# Patient Record
Sex: FEMALE | Race: WHITE | Employment: OTHER | URBAN - METROPOLITAN AREA
[De-identification: names, ages, dates, MRNs, and addresses within clinical notes are randomized per-mention and may not be internally consistent; named-entity substitution may affect disease eponyms.]

---

## 2019-02-19 ENCOUNTER — HOSPITAL ENCOUNTER (INPATIENT)
Age: 84
LOS: 3 days | Discharge: NURSING, REHAB OR LONG-TERM CARE | DRG: 948 | End: 2019-02-22
Attending: EMERGENCY MEDICINE | Admitting: INTERNAL MEDICINE
Payer: MEDICARE

## 2019-02-19 ENCOUNTER — APPOINTMENT (OUTPATIENT)
Dept: CT IMAGING | Age: 84
DRG: 948 | End: 2019-02-19
Attending: EMERGENCY MEDICINE
Payer: MEDICARE

## 2019-02-19 ENCOUNTER — APPOINTMENT (OUTPATIENT)
Dept: GENERAL RADIOLOGY | Age: 84
DRG: 948 | End: 2019-02-19
Attending: EMERGENCY MEDICINE
Payer: MEDICARE

## 2019-02-19 ENCOUNTER — APPOINTMENT (OUTPATIENT)
Dept: GENERAL RADIOLOGY | Age: 84
DRG: 948 | End: 2019-02-19
Attending: NURSE PRACTITIONER
Payer: MEDICARE

## 2019-02-19 ENCOUNTER — APPOINTMENT (OUTPATIENT)
Dept: MRI IMAGING | Age: 84
DRG: 948 | End: 2019-02-19
Attending: NURSE PRACTITIONER
Payer: MEDICARE

## 2019-02-19 ENCOUNTER — APPOINTMENT (OUTPATIENT)
Dept: CT IMAGING | Age: 84
DRG: 948 | End: 2019-02-19
Attending: NURSE PRACTITIONER
Payer: MEDICARE

## 2019-02-19 DIAGNOSIS — R53.1 WEAKNESS: ICD-10-CM

## 2019-02-19 DIAGNOSIS — R55 SYNCOPE AND COLLAPSE: Primary | ICD-10-CM

## 2019-02-19 LAB
ALBUMIN SERPL-MCNC: 3.3 G/DL (ref 3.2–4.6)
ALBUMIN/GLOB SERPL: 0.8 {RATIO} (ref 1.2–3.5)
ALP SERPL-CCNC: 105 U/L (ref 50–136)
ALT SERPL-CCNC: 18 U/L (ref 12–65)
ANION GAP SERPL CALC-SCNC: 4 MMOL/L (ref 7–16)
AST SERPL-CCNC: 16 U/L (ref 15–37)
ATRIAL RATE: 79 BPM
BACTERIA URNS QL MICRO: 0 /HPF
BASOPHILS # BLD: 0 K/UL (ref 0–0.2)
BASOPHILS NFR BLD: 0 % (ref 0–2)
BILIRUB SERPL-MCNC: 0.3 MG/DL (ref 0.2–1.1)
BUN SERPL-MCNC: 23 MG/DL (ref 8–23)
CALCIUM SERPL-MCNC: 8.9 MG/DL (ref 8.3–10.4)
CALCULATED P AXIS, ECG09: 84 DEGREES
CALCULATED R AXIS, ECG10: 59 DEGREES
CALCULATED T AXIS, ECG11: 75 DEGREES
CASTS URNS QL MICRO: 0 /LPF
CHLORIDE SERPL-SCNC: 105 MMOL/L (ref 98–107)
CO2 SERPL-SCNC: 30 MMOL/L (ref 21–32)
CREAT SERPL-MCNC: 1.02 MG/DL (ref 0.6–1)
DIAGNOSIS, 93000: NORMAL
DIFFERENTIAL METHOD BLD: ABNORMAL
EOSINOPHIL # BLD: 0 K/UL (ref 0–0.8)
EOSINOPHIL NFR BLD: 0 % (ref 0.5–7.8)
EPI CELLS #/AREA URNS HPF: 0 /HPF
ERYTHROCYTE [DISTWIDTH] IN BLOOD BY AUTOMATED COUNT: 13.1 % (ref 11.9–14.6)
GLOBULIN SER CALC-MCNC: 4.2 G/DL (ref 2.3–3.5)
GLUCOSE SERPL-MCNC: 127 MG/DL (ref 65–100)
HCT VFR BLD AUTO: 40.2 % (ref 35.8–46.3)
HGB BLD-MCNC: 12.9 G/DL (ref 11.7–15.4)
IMM GRANULOCYTES # BLD AUTO: 0 K/UL (ref 0–0.5)
IMM GRANULOCYTES NFR BLD AUTO: 0 % (ref 0–5)
LYMPHOCYTES # BLD: 0.7 K/UL (ref 0.5–4.6)
LYMPHOCYTES NFR BLD: 7 % (ref 13–44)
MCH RBC QN AUTO: 30.1 PG (ref 26.1–32.9)
MCHC RBC AUTO-ENTMCNC: 32.1 G/DL (ref 31.4–35)
MCV RBC AUTO: 93.9 FL (ref 79.6–97.8)
MONOCYTES # BLD: 0.9 K/UL (ref 0.1–1.3)
MONOCYTES NFR BLD: 9 % (ref 4–12)
NEUTS SEG # BLD: 8 K/UL (ref 1.7–8.2)
NEUTS SEG NFR BLD: 83 % (ref 43–78)
NRBC # BLD: 0 K/UL (ref 0–0.2)
P-R INTERVAL, ECG05: 176 MS
PLATELET # BLD AUTO: 232 K/UL (ref 150–450)
PMV BLD AUTO: 9.6 FL (ref 9.4–12.3)
POTASSIUM SERPL-SCNC: 4.1 MMOL/L (ref 3.5–5.1)
PROT SERPL-MCNC: 7.5 G/DL (ref 6.3–8.2)
Q-T INTERVAL, ECG07: 384 MS
QRS DURATION, ECG06: 82 MS
QTC CALCULATION (BEZET), ECG08: 440 MS
RBC # BLD AUTO: 4.28 M/UL (ref 4.05–5.2)
RBC #/AREA URNS HPF: NORMAL /HPF
SODIUM SERPL-SCNC: 139 MMOL/L (ref 136–145)
TROPONIN I SERPL-MCNC: 0.02 NG/ML (ref 0.02–0.05)
TROPONIN I SERPL-MCNC: <0.02 NG/ML (ref 0.02–0.05)
VENTRICULAR RATE, ECG03: 79 BPM
WBC # BLD AUTO: 9.7 K/UL (ref 4.3–11.1)
WBC URNS QL MICRO: 0 /HPF

## 2019-02-19 PROCEDURE — 65660000000 HC RM CCU STEPDOWN

## 2019-02-19 PROCEDURE — 74011250637 HC RX REV CODE- 250/637: Performed by: NURSE PRACTITIONER

## 2019-02-19 PROCEDURE — 70450 CT HEAD/BRAIN W/O DYE: CPT

## 2019-02-19 PROCEDURE — 85025 COMPLETE CBC W/AUTO DIFF WBC: CPT

## 2019-02-19 PROCEDURE — 74011000258 HC RX REV CODE- 258: Performed by: INTERNAL MEDICINE

## 2019-02-19 PROCEDURE — 80053 COMPREHEN METABOLIC PANEL: CPT

## 2019-02-19 PROCEDURE — 71045 X-RAY EXAM CHEST 1 VIEW: CPT

## 2019-02-19 PROCEDURE — 73562 X-RAY EXAM OF KNEE 3: CPT

## 2019-02-19 PROCEDURE — 81003 URINALYSIS AUTO W/O SCOPE: CPT | Performed by: EMERGENCY MEDICINE

## 2019-02-19 PROCEDURE — 36415 COLL VENOUS BLD VENIPUNCTURE: CPT

## 2019-02-19 PROCEDURE — 99285 EMERGENCY DEPT VISIT HI MDM: CPT | Performed by: EMERGENCY MEDICINE

## 2019-02-19 PROCEDURE — 74011636320 HC RX REV CODE- 636/320: Performed by: INTERNAL MEDICINE

## 2019-02-19 PROCEDURE — 86580 TB INTRADERMAL TEST: CPT | Performed by: NURSE PRACTITIONER

## 2019-02-19 PROCEDURE — 74011250636 HC RX REV CODE- 250/636: Performed by: NURSE PRACTITIONER

## 2019-02-19 PROCEDURE — 73610 X-RAY EXAM OF ANKLE: CPT

## 2019-02-19 PROCEDURE — 93005 ELECTROCARDIOGRAM TRACING: CPT | Performed by: EMERGENCY MEDICINE

## 2019-02-19 PROCEDURE — 87086 URINE CULTURE/COLONY COUNT: CPT

## 2019-02-19 PROCEDURE — 70551 MRI BRAIN STEM W/O DYE: CPT

## 2019-02-19 PROCEDURE — 74011000302 HC RX REV CODE- 302: Performed by: NURSE PRACTITIONER

## 2019-02-19 PROCEDURE — 81015 MICROSCOPIC EXAM OF URINE: CPT

## 2019-02-19 PROCEDURE — 84484 ASSAY OF TROPONIN QUANT: CPT

## 2019-02-19 PROCEDURE — 77030020263 HC SOL INJ SOD CL0.9% LFCR 1000ML

## 2019-02-19 PROCEDURE — 70496 CT ANGIOGRAPHY HEAD: CPT

## 2019-02-19 RX ORDER — PANTOPRAZOLE SODIUM 40 MG/1
40 TABLET, DELAYED RELEASE ORAL
Status: DISCONTINUED | OUTPATIENT
Start: 2019-02-20 | End: 2019-02-22 | Stop reason: HOSPADM

## 2019-02-19 RX ORDER — SODIUM CHLORIDE 9 MG/ML
1 INJECTION, SOLUTION INTRAVENOUS AS NEEDED
Status: DISPENSED | OUTPATIENT
Start: 2019-02-19 | End: 2019-02-20

## 2019-02-19 RX ORDER — SODIUM CHLORIDE 0.9 % (FLUSH) 0.9 %
5-40 SYRINGE (ML) INJECTION EVERY 8 HOURS
Status: DISCONTINUED | OUTPATIENT
Start: 2019-02-19 | End: 2019-02-22 | Stop reason: HOSPADM

## 2019-02-19 RX ORDER — LABETALOL HCL 20 MG/4 ML
5 SYRINGE (ML) INTRAVENOUS
Status: DISCONTINUED | OUTPATIENT
Start: 2019-02-19 | End: 2019-02-22 | Stop reason: HOSPADM

## 2019-02-19 RX ORDER — HEPARIN SODIUM 5000 [USP'U]/ML
5000 INJECTION, SOLUTION INTRAVENOUS; SUBCUTANEOUS EVERY 8 HOURS
Status: DISCONTINUED | OUTPATIENT
Start: 2019-02-19 | End: 2019-02-22 | Stop reason: HOSPADM

## 2019-02-19 RX ORDER — ACETAMINOPHEN 325 MG/1
650 TABLET ORAL
Status: DISCONTINUED | OUTPATIENT
Start: 2019-02-19 | End: 2019-02-22 | Stop reason: HOSPADM

## 2019-02-19 RX ORDER — SODIUM CHLORIDE 9 MG/ML
3 INJECTION, SOLUTION INTRAVENOUS ONCE
Status: COMPLETED | OUTPATIENT
Start: 2019-02-19 | End: 2019-02-20

## 2019-02-19 RX ORDER — SODIUM CHLORIDE 9 MG/ML
75 INJECTION, SOLUTION INTRAVENOUS CONTINUOUS
Status: DISPENSED | OUTPATIENT
Start: 2019-02-19 | End: 2019-02-20

## 2019-02-19 RX ORDER — SODIUM CHLORIDE 0.9 % (FLUSH) 0.9 %
5-40 SYRINGE (ML) INJECTION AS NEEDED
Status: DISCONTINUED | OUTPATIENT
Start: 2019-02-19 | End: 2019-02-22 | Stop reason: HOSPADM

## 2019-02-19 RX ORDER — GUAIFENESIN 100 MG/5ML
81 LIQUID (ML) ORAL DAILY
Status: DISCONTINUED | OUTPATIENT
Start: 2019-02-20 | End: 2019-02-22 | Stop reason: HOSPADM

## 2019-02-19 RX ORDER — ONDANSETRON 2 MG/ML
4 INJECTION INTRAMUSCULAR; INTRAVENOUS
Status: DISCONTINUED | OUTPATIENT
Start: 2019-02-19 | End: 2019-02-22 | Stop reason: HOSPADM

## 2019-02-19 RX ORDER — ATORVASTATIN CALCIUM 40 MG/1
40 TABLET, FILM COATED ORAL
Status: DISCONTINUED | OUTPATIENT
Start: 2019-02-19 | End: 2019-02-22 | Stop reason: HOSPADM

## 2019-02-19 RX ORDER — LEVOTHYROXINE SODIUM 75 UG/1
75 TABLET ORAL
Status: DISCONTINUED | OUTPATIENT
Start: 2019-02-20 | End: 2019-02-22 | Stop reason: HOSPADM

## 2019-02-19 RX ORDER — AMOXICILLIN 250 MG
2 CAPSULE ORAL DAILY PRN
Status: DISCONTINUED | OUTPATIENT
Start: 2019-02-19 | End: 2019-02-22 | Stop reason: HOSPADM

## 2019-02-19 RX ORDER — SODIUM CHLORIDE 0.9 % (FLUSH) 0.9 %
10 SYRINGE (ML) INJECTION
Status: COMPLETED | OUTPATIENT
Start: 2019-02-19 | End: 2019-02-19

## 2019-02-19 RX ADMIN — SODIUM CHLORIDE 75 ML/HR: 900 INJECTION, SOLUTION INTRAVENOUS at 15:41

## 2019-02-19 RX ADMIN — Medication 10 ML: at 23:19

## 2019-02-19 RX ADMIN — SODIUM CHLORIDE 3 ML/KG/HR: 900 INJECTION, SOLUTION INTRAVENOUS at 19:32

## 2019-02-19 RX ADMIN — SODIUM CHLORIDE 100 ML: 900 INJECTION, SOLUTION INTRAVENOUS at 23:19

## 2019-02-19 RX ADMIN — IOPAMIDOL 100 ML: 755 INJECTION, SOLUTION INTRAVENOUS at 23:19

## 2019-02-19 RX ADMIN — ATORVASTATIN CALCIUM 40 MG: 40 TABLET, FILM COATED ORAL at 21:25

## 2019-02-19 RX ADMIN — HEPARIN SODIUM 5000 UNITS: 5000 INJECTION INTRAVENOUS; SUBCUTANEOUS at 21:23

## 2019-02-19 RX ADMIN — Medication 5 ML: at 21:27

## 2019-02-19 RX ADMIN — TUBERCULIN PURIFIED PROTEIN DERIVATIVE 5 UNITS: 5 INJECTION, SOLUTION INTRADERMAL at 23:55

## 2019-02-19 NOTE — PROGRESS NOTES
Patient states that she lives at [facility]. She uses no walker or cane. No home O2 and no PT or OT services. PCP: Dr. Manjinder CAPONE: is patient's son, Rance Sicard: [phone] and his wife Ezra Tarango [phone]. No needs identified at this time.

## 2019-02-19 NOTE — H&P
Hospitalist H&P Note Admit Date:  2019  9:41 AM  
Name:  Vipul Hartman Age:  80 y.o. 
:  1920 MRN:  [id number] PCP:  iGgi Galvan MD 
Treatment Team: Attending Provider: Holly Lopez MD; Primary Nurse: Jesus Duncan RN 
 
HPI:  
 
Patient is a 81 yo female with PMH of GERD and acquired hypothyroidism who came into the ER with complaints of weakness causing her to fall last night and this morning. Patient with left sided knee pain and edematous left foot with bruising. ROM not limited. Xrays without acute process. Patient states she had a headache last night behind her left ear, but denies CP, SOB, visual disturbances, recent illness. Patient reports this happened about two months ago, but did not seek medical attention. CT head with supratentorial microischemia. Suggested follow up MRI. 10 systems reviewed and negative except as noted in HPI. Past Medical History:  
Diagnosis Date  Arthritis  Dependent edema  GERD (gastroesophageal reflux disease)  Headache   
 ocular migraines  Inguinal hernia recurrent bilateral   
 Stasis dermatitis of both legs  Thyroid disease   
 hypothyroid Past Surgical History:  
Procedure Laterality Date  HX COLONOSCOPY    
 HX ENDOSCOPY    
 HH, gastritis,GERD  
 HX GI Antibiotics cause diarrhea  HX GYN Csection  HX HEENT Bilateral Cataract  HX ORTHOPAEDIC    
 wrist surg for fracture Allergies Allergen Reactions  Amoxicillin Diarrhea  Azithromycin Diarrhea  Ceftriaxone Diarrhea  Cefuroxime Diarrhea  Celecoxib Diarrhea  Dexilant [Dexlansoprazole] Diarrhea  Keflex [Cephalexin] Diarrhea  Metronidazole Diarrhea  Sulfamethoxazole-Trimethoprim Diarrhea Social History Tobacco Use  Smoking status: Never Smoker  Smokeless tobacco: Never Used Substance Use Topics  Alcohol use: No  
  
Family History Problem Relation Age of Onset  Stroke Mother Immunization History Administered Date(s) Administered  Influenza Vaccine 10/01/2015, 09/30/2017, 09/09/2018  Pneumococcal Vaccine (Unspecified Type) 01/01/2001 PTA Medications: 
Prior to Admission Medications Prescriptions Last Dose Informant Patient Reported? Taking?  
levothyroxine (SYNTHROID) 75 mcg tablet   No No  
Sig: Take 1 tablet by mouth 5 days a week. Indications: hypothyroidism Facility-Administered Medications: None Objective:  
 
Patient Vitals for the past 24 hrs: 
 Temp Pulse Resp BP SpO2  
02/19/19 0838 98 °F (36.7 °C) 80 18 176/88 100 % Oxygen Therapy O2 Sat (%): 100 % (02/19/19 0838) Pulse via Oximetry: 80 beats per minute (02/19/19 4375) O2 Device: Room air (02/19/19 4892) No intake or output data in the 24 hours ending 02/19/19 1425 Physical Exam: 
General:    Elderly, alert. frail Eyes:   Normal sclera. Extraocular movements intact. ENT:  Normocephalic, atraumatic. Moist mucous membranes CV:   RRR. No m/r/g. Peripheral pulses 2+. Capillary refill <2s. Lungs:  Diminished with Rhonci. On room air. Abdomen: Soft, nontender, nondistended. Bowel sounds normal.  
Extremities: Warm and dry. No cyanosis. Left foot bruised and edematous. Neurologic: CN II-XII grossly intact. Sensation intact. Skin:     No rashes or jaundice. Normal coloration. Bruising on left foot. Psych:  Normal mood and affect. I reviewed the labs, imaging, EKGs, telemetry, and other studies done this admission. Data Review:  
Recent Results (from the past 24 hour(s)) CBC WITH AUTOMATED DIFF Collection Time: 02/19/19  8:44 AM  
Result Value Ref Range WBC 9.7 4.3 - 11.1 K/uL  
 RBC 4.28 4.05 - 5.2 M/uL  
 HGB 12.9 11.7 - 15.4 g/dL HCT 40.2 35.8 - 46.3 % MCV 93.9 79.6 - 97.8 FL  
 MCH 30.1 26.1 - 32.9 PG  
 MCHC 32.1 31.4 - 35.0 g/dL  
 RDW 13.1 11.9 - 14.6 % PLATELET 813 234 - 203 K/uL MPV 9.6 9.4 - 12.3 FL ABSOLUTE NRBC 0.00 0.0 - 0.2 K/uL  
 DF AUTOMATED NEUTROPHILS 83 (H) 43 - 78 % LYMPHOCYTES 7 (L) 13 - 44 % MONOCYTES 9 4.0 - 12.0 % EOSINOPHILS 0 (L) 0.5 - 7.8 % BASOPHILS 0 0.0 - 2.0 % IMMATURE GRANULOCYTES 0 0.0 - 5.0 %  
 ABS. NEUTROPHILS 8.0 1.7 - 8.2 K/UL  
 ABS. LYMPHOCYTES 0.7 0.5 - 4.6 K/UL  
 ABS. MONOCYTES 0.9 0.1 - 1.3 K/UL  
 ABS. EOSINOPHILS 0.0 0.0 - 0.8 K/UL  
 ABS. BASOPHILS 0.0 0.0 - 0.2 K/UL  
 ABS. IMM. GRANS. 0.0 0.0 - 0.5 K/UL METABOLIC PANEL, COMPREHENSIVE Collection Time: 02/19/19  8:44 AM  
Result Value Ref Range Sodium 139 136 - 145 mmol/L Potassium 4.1 3.5 - 5.1 mmol/L Chloride 105 98 - 107 mmol/L  
 CO2 30 21 - 32 mmol/L Anion gap 4 (L) 7 - 16 mmol/L Glucose 127 (H) 65 - 100 mg/dL BUN 23 8 - 23 MG/DL Creatinine 1.02 (H) 0.6 - 1.0 MG/DL  
 GFR est AA >60 >60 ml/min/1.73m2 GFR est non-AA 53 (L) >60 ml/min/1.73m2 Calcium 8.9 8.3 - 10.4 MG/DL Bilirubin, total 0.3 0.2 - 1.1 MG/DL  
 ALT (SGPT) 18 12 - 65 U/L  
 AST (SGOT) 16 15 - 37 U/L Alk. phosphatase 105 50 - 136 U/L Protein, total 7.5 6.3 - 8.2 g/dL Albumin 3.3 3.2 - 4.6 g/dL Globulin 4.2 (H) 2.3 - 3.5 g/dL A-G Ratio 0.8 (L) 1.2 - 3.5    
TROPONIN I Collection Time: 02/19/19  8:44 AM  
Result Value Ref Range Troponin-I, Qt. <0.02 (L) 0.02 - 0.05 NG/ML  
URINE MICROSCOPIC Collection Time: 02/19/19  9:49 AM  
Result Value Ref Range WBC 0 0 /hpf  
 RBC 0-3 0 /hpf Epithelial cells 0 0 /hpf Bacteria 0 0 /hpf Casts 0 0 /lpf EKG, 12 LEAD, INITIAL Collection Time: 02/19/19 10:54 AM  
Result Value Ref Range Ventricular Rate 79 BPM  
 Atrial Rate 79 BPM  
 P-R Interval 176 ms QRS Duration 82 ms Q-T Interval 384 ms QTC Calculation (Bezet) 440 ms Calculated P Axis 84 degrees Calculated R Axis 59 degrees Calculated T Axis 75 degrees Diagnosis Normal sinus rhythm Normal ECG No previous ECGs available Confirmed by ST GOAPL LACY MD (), TRACY MARI ([id number]) on 2/19/2019 12:34:39 PM 
  
 
 
All Micro Results None Other Studies: 
Xr Ankle Lt Min 3 V Result Date: 2/19/2019 Portable left ankle 2/19/2019 INDICATION: Trauma 3 views remarkable for diffuse soft tissue edema. Ankle mortise is intact and there is no fracture or dislocation. IMPRESSION: Soft tissue edema Ct Head Wo Cont Result Date: 2/19/2019 CT scan of the brain 2/19/2019 Scanning was performed within 24 hours of arrival to the facility Comparison:None Dose reduction techniques used: Automated exposure control, adjustment of the mAs and/or kVp according to patient size, standardized low-dose protocol, and/or iterative reconstruction technique. Indication:Headache Findings: The brain parenchyma and ventricular structures are remarkable for subcortical and periventricular white matter lucency. . There is normal white-grey matter differentiation. There are no mass lesions, hemorrhage, or evidence of an acute stroke. The calvarium and the sinuses are unremarkable. Impression: Supratentorial microischemia. Note: If a subtle CVA is suspected, MRI would be more definitive if clinically warranted. Xr Knee Lt 3 V Result Date: 2/19/2019 LEFT KNEE RADIOGRAPHS, 2/19/2019 CLINICAL HISTORY: Fall last night with diffuse knee pain. TECHNIQUE:  AP, lateral, and oblique views of the left knee. FINDINGS: Three views of the left knee are submitted for evaluation. Alignment of the osseous structures is maintained. No acute fractures are seen. A chronic appearing calcification is seen adjacent to the medial femoral condyle. No significant joint effusion is seen. Overlying soft tissues are otherwise unremarkable in appearance. IMPRESSION: 1. No acute osseous abnormality of the left knee evident by plain film imaging. Assessment and Plan:  
 
Hospital Problems as of 2/19/2019 Date Reviewed: 4/16/2018 Codes Class Noted - Resolved POA Weakness ICD-10-CM: R53.1 ICD-9-CM: 780.79  2/19/2019 - Present Unknown PLAN: 
 
Weakness causing fall -IVF 
-PT/OT/PPD/CM/ST 
-CT with possible infarct 
-MRI 
-CTA 
-Echo 
-Permissive BP  
-Lipid panel/cbc/bmp/hgbA1c 
-CXR  
-UA & CS 
-Add statin and asa Hypothyroidism 
-Continue home medications GERD 
-protonix Discharge planning:  Home vs str DVT ppx: SQH Code status:  Full Estimated LOS:  Greater than 2 midnights Risk:  high Plan of care discussed with Dr. Rodolfo Estrada Signed: 
KATIE VelizC

## 2019-02-19 NOTE — PROGRESS NOTES
RN Artist Refugio aware of IV Hydration due to GFR of 53. She will start hydration when pt returns from MRI.

## 2019-02-19 NOTE — ED PROVIDER NOTES
Presents with complaint of weakness. Patient states she had a left-sided headache and felt lightheaded. She denies any dizziness. She then loss of motor strength and filmy ground. She hurt her left knee at that time. She dragged herself up onto the bed and when she got up this morning she was unable to ambulate. She states she had this once before several months ago but her strength came back quickly. She denies any headache now. Patient lives independently and does not use a cane or walker but now is unable to transfer from the wheelchair to toilet. The history is provided by the patient and a relative. The history is limited by the condition of the patient. Knee Pain This is a new problem. The current episode started 12 to 24 hours ago. The problem occurs constantly. The problem has not changed since onset. The pain is present in the left knee and left ankle. The quality of the pain is described as aching. The pain is mild. Pertinent negatives include no numbness, full range of motion and no stiffness. Past Medical History:  
Diagnosis Date  Arthritis  Dependent edema  GERD (gastroesophageal reflux disease)  Headache   
 ocular migraines  Inguinal hernia recurrent bilateral   
 Stasis dermatitis of both legs  Thyroid disease   
 hypothyroid Past Surgical History:  
Procedure Laterality Date  HX COLONOSCOPY    
 HX ENDOSCOPY    
 HH, gastritis,GERD  
 HX GI Antibiotics cause diarrhea  HX GYN Csection  HX HEENT Bilateral Cataract  HX ORTHOPAEDIC    
 wrist surg for fracture Family History:  
Problem Relation Age of Onset  Stroke Mother Social History Socioeconomic History  Marital status:  Spouse name: Not on file  Number of children: Not on file  Years of education: Not on file  Highest education level: Not on file Social Needs  Financial resource strain: Not on file  Food insecurity - worry: Not on file  Food insecurity - inability: Not on file  Transportation needs - medical: Not on file  Transportation needs - non-medical: Not on file Occupational History  Not on file Tobacco Use  Smoking status: Never Smoker  Smokeless tobacco: Never Used Substance and Sexual Activity  Alcohol use: No  
 Drug use: No  
 Sexual activity: Not on file Other Topics Concern  Not on file Social History Narrative  Not on file ALLERGIES: Amoxicillin; Azithromycin; Ceftriaxone; Cefuroxime; Celecoxib; Yumiko Hopes; Keflex [cephalexin]; Metronidazole; and Sulfamethoxazole-trimethoprim Review of Systems Musculoskeletal: Negative for stiffness. Neurological: Negative for numbness. All other systems reviewed and are negative. Vitals:  
 02/19/19 8665 BP: 176/88 Pulse: 80 Resp: 18 Temp: 98 °F (36.7 °C) SpO2: 100% Weight: 46.7 kg (103 lb) Height: 4' 9\" (1.448 m) Physical Exam  
Constitutional: She is oriented to person, place, and time. She appears well-developed and well-nourished. No distress. HENT:  
Head: Normocephalic and atraumatic. Eyes: Conjunctivae are normal. Right eye exhibits no discharge. Left eye exhibits no discharge. Neck: Normal range of motion. Neck supple. Cardiovascular: Normal rate and regular rhythm. Pulmonary/Chest: Effort normal and breath sounds normal. No respiratory distress. Abdominal: Soft. She exhibits no distension. There is no tenderness. Musculoskeletal: She exhibits edema. L knee ecchymosis and L ankle edema, BLE weakness Neurological: She is alert and oriented to person, place, and time. No cranial nerve deficit. She exhibits normal muscle tone. Skin: Skin is warm and dry. Capillary refill takes less than 2 seconds. She is not diaphoretic. Psychiatric: She has a normal mood and affect. Her behavior is normal.  
Nursing note and vitals reviewed. MDM 
Number of Diagnoses or Management Options Syncope and collapse:  
Weakness:  
Diagnosis management comments: Patient yesterday able to do all of her ADLs and walks independently without a cane or walker and now has inability to even transfer herself from wheelchair. It is unclear the cause. He episode she describes some significant syncopal episode. Discussed with the hospitalist for admission Amount and/or Complexity of Data Reviewed Clinical lab tests: ordered and reviewed Review and summarize past medical records: yes (No previous admissions sees Dr. Naren Carroll) Discuss the patient with other providers: yes Independent visualization of images, tracings, or specimens: yes (SR no ST elevation normal QRS) Risk of Complications, Morbidity, and/or Mortality Presenting problems: high Diagnostic procedures: moderate Management options: high Patient Progress Patient progress: stable Procedures

## 2019-02-19 NOTE — PROGRESS NOTES
Patient gone to MRI at this time. Fluids will be started per CT protocol upon return. Patient stable with no complaints before leaving. Patient ate dinner well with no difficulty. Patient able to ambulate x1 assist but with knee weakness noted. Patient was stable though. Patient has been instructed to call if assistance is needed. Patient verbalized understanding. Report to be given to oncoming RN 7p-7a

## 2019-02-19 NOTE — PROGRESS NOTES
Patient arrived to room 805 via transport from ED. Patient is alert and orientated with no complaints at this time. Respirations even and unlabored with no distress noted. IV intact and patent with no s/s of infection noted. Duel skin assessment completed with Glenys Chamberlain RN. Patient has no open skin areas at all. Patients left foot is swollen and bruised on top and bottom. Patient orientated to room and surroundings. Patient on bed rest at this time. Patient will receive a STAND test then diet will be ordered. MRI consent to be completed in computer. Bed in low locked position with call light within reach. Patient instructed to call if assistance is needed. Will continue to monitor.

## 2019-02-19 NOTE — ED TRIAGE NOTES
Pt brought via EMS c/o weakness with 2 falls once last night and once this morning. PT report left sided knee pain. No deformity or pain when not moving. Left foot is swollen no limited rom. 163/84 then 129/76 P-71 O2 sat 100 t-97.8. Patient describes the incident as \"sudden weakness. \" Reports that she had this same thing happen a few months ago.

## 2019-02-19 NOTE — ROUTINE PROCESS
TRANSFER - OUT REPORT: 
 
Verbal report given to Gisselle Meade  on Augusto Maryan  being transferred to  for routine progression of care Report consisted of patients Situation, Background, Assessment and  
Recommendations(SBAR). Information from the following report(s) SBAR was reviewed with the receiving nurse. Lines:  
Peripheral IV 02/19/19 Left Antecubital (Active) Opportunity for questions and clarification was provided. Patient transported with: 
 [employer]

## 2019-02-20 ENCOUNTER — APPOINTMENT (OUTPATIENT)
Dept: ULTRASOUND IMAGING | Age: 84
DRG: 948 | End: 2019-02-20
Attending: INTERNAL MEDICINE
Payer: MEDICARE

## 2019-02-20 LAB
ANION GAP SERPL CALC-SCNC: 7 MMOL/L (ref 7–16)
BUN SERPL-MCNC: 14 MG/DL (ref 8–23)
CALCIUM SERPL-MCNC: 8.2 MG/DL (ref 8.3–10.4)
CHLORIDE SERPL-SCNC: 109 MMOL/L (ref 98–107)
CHOLEST SERPL-MCNC: 157 MG/DL
CO2 SERPL-SCNC: 26 MMOL/L (ref 21–32)
CREAT SERPL-MCNC: 0.89 MG/DL (ref 0.6–1)
ERYTHROCYTE [DISTWIDTH] IN BLOOD BY AUTOMATED COUNT: 13 % (ref 11.9–14.6)
EST. AVERAGE GLUCOSE BLD GHB EST-MCNC: 111 MG/DL
GLUCOSE SERPL-MCNC: 109 MG/DL (ref 65–100)
HBA1C MFR BLD: 5.5 % (ref 4.8–6)
HCT VFR BLD AUTO: 37.2 % (ref 35.8–46.3)
HDLC SERPL-MCNC: 58 MG/DL (ref 40–60)
HDLC SERPL: 2.7 {RATIO}
HGB BLD-MCNC: 12.1 G/DL (ref 11.7–15.4)
LDLC SERPL CALC-MCNC: 81.6 MG/DL
LIPID PROFILE,FLP: NORMAL
MCH RBC QN AUTO: 30.3 PG (ref 26.1–32.9)
MCHC RBC AUTO-ENTMCNC: 32.5 G/DL (ref 31.4–35)
MCV RBC AUTO: 93.2 FL (ref 79.6–97.8)
NRBC # BLD: 0 K/UL (ref 0–0.2)
PLATELET # BLD AUTO: 238 K/UL (ref 150–450)
PMV BLD AUTO: 10.1 FL (ref 9.4–12.3)
POTASSIUM SERPL-SCNC: 3.6 MMOL/L (ref 3.5–5.1)
RBC # BLD AUTO: 3.99 M/UL (ref 4.05–5.2)
SODIUM SERPL-SCNC: 142 MMOL/L (ref 136–145)
TRIGL SERPL-MCNC: 87 MG/DL (ref 35–150)
VLDLC SERPL CALC-MCNC: 17.4 MG/DL (ref 6–23)
WBC # BLD AUTO: 6.5 K/UL (ref 4.3–11.1)

## 2019-02-20 PROCEDURE — 36415 COLL VENOUS BLD VENIPUNCTURE: CPT

## 2019-02-20 PROCEDURE — 80061 LIPID PANEL: CPT

## 2019-02-20 PROCEDURE — 93880 EXTRACRANIAL BILAT STUDY: CPT

## 2019-02-20 PROCEDURE — 92610 EVALUATE SWALLOWING FUNCTION: CPT

## 2019-02-20 PROCEDURE — 74011250637 HC RX REV CODE- 250/637: Performed by: NURSE PRACTITIONER

## 2019-02-20 PROCEDURE — 93306 TTE W/DOPPLER COMPLETE: CPT

## 2019-02-20 PROCEDURE — 83036 HEMOGLOBIN GLYCOSYLATED A1C: CPT

## 2019-02-20 PROCEDURE — 80048 BASIC METABOLIC PNL TOTAL CA: CPT

## 2019-02-20 PROCEDURE — 97161 PT EVAL LOW COMPLEX 20 MIN: CPT

## 2019-02-20 PROCEDURE — 65660000000 HC RM CCU STEPDOWN

## 2019-02-20 PROCEDURE — 85027 COMPLETE CBC AUTOMATED: CPT

## 2019-02-20 PROCEDURE — 74011250636 HC RX REV CODE- 250/636: Performed by: NURSE PRACTITIONER

## 2019-02-20 RX ADMIN — Medication 10 ML: at 21:13

## 2019-02-20 RX ADMIN — ATORVASTATIN CALCIUM 40 MG: 40 TABLET, FILM COATED ORAL at 21:11

## 2019-02-20 RX ADMIN — ASPIRIN 81 MG 81 MG: 81 TABLET ORAL at 09:10

## 2019-02-20 RX ADMIN — PANTOPRAZOLE SODIUM 40 MG: 40 TABLET, DELAYED RELEASE ORAL at 05:40

## 2019-02-20 RX ADMIN — Medication 5 ML: at 16:43

## 2019-02-20 RX ADMIN — HEPARIN SODIUM 5000 UNITS: 5000 INJECTION INTRAVENOUS; SUBCUTANEOUS at 05:36

## 2019-02-20 RX ADMIN — LEVOTHYROXINE SODIUM 75 MCG: 75 TABLET ORAL at 05:34

## 2019-02-20 RX ADMIN — Medication 5 ML: at 05:44

## 2019-02-20 RX ADMIN — HEPARIN SODIUM 5000 UNITS: 5000 INJECTION INTRAVENOUS; SUBCUTANEOUS at 14:00

## 2019-02-20 RX ADMIN — HEPARIN SODIUM 5000 UNITS: 5000 INJECTION INTRAVENOUS; SUBCUTANEOUS at 21:12

## 2019-02-20 NOTE — PROGRESS NOTES
CM spoke with patient and son about patient discharging to rehab. [facility] can accept patient after 3 nights inpatient which will be Friday. CM following.

## 2019-02-20 NOTE — PROGRESS NOTES
Speech language pathology: bedside swallow note: Initial Assessment NAME/AGE/GENDER: Augusto Steinberg is a 80 y.o. female DATE: 2/20/2019 PRIMARY DIAGNOSIS: Weakness [R53.1] ICD-10: Treatment Diagnosis: R13.12 Oropharyngeal Dysphagia. INTERDISCIPLINARY COLLABORATION: Registered Nurse PRECAUTIONS/ALLERGIES: Amoxicillin; Azithromycin; Ceftriaxone; Cefuroxime; Celecoxib; Tracy Galaviz; Keflex [cephalexin]; Metronidazole; and Sulfamethoxazole-trimethoprim ASSESSMENT:Based on the objective data described below, Ms. Sera Diaz presents with swallow function that is within normal limits. Patient presented with thin liquid via cup and straw, puree, mixed, and solid consistencies. Appropriate oral prep with all textures. Timely swallow initiation, and single swallows upon palpation. Adequate oral clearing. No overt signs or symptoms of airway compromise observed with liquid or solid textures. Recommend continue regular diet/thin liquids. Medications whole with liquid wash. No further speech therapy indicated at this time. .?????? ? ? This section established at most recent assessment?????????? 
PROBLEM LIST (Impairments causing functional limitations): 1. Oropharyngeal dysphagia- No symptoms identified REHABILITATION POTENTIAL FOR STATED GOALS: Excellent PLAN OF CARE:  
Patient will benefit from skilled intervention to address the following impairments. RECOMMENDATIONS AND PLANNED INTERVENTIONS (Benefits and precautions of therapy have been discussed with the patient.): 
· continue prescribed diet MEDICATIONS: 
· With liquid ASPIRATION PRECAUTIONS: 
· Slow rate of intake · Small bites/sips · Upright at 90 degrees during meal 
COMPENSATORY STRATEGIES/MODIFICATIONS INCLUDING: 
· None OTHER RECOMMENDATIONS (including follow up treatment recommendations):  
· Patient education RECOMMENDED DIET MODIFICATIONS DISCUSSED WITH: 
· Nursing · Patient FREQUENCY/DURATION: No further speech therapy indicated at this time as oropharyngeal swallow function is within normal limits. RECOMMENDED REHABILITATION/EQUIPMENT: (at time of discharge pending progress): Due to the probability of continued deficits (see above) this patient will not likely need continued skilled speech therapy after discharge. SUBJECTIVE:  
Friendly, pleasant. History of Present Injury/Illness: Ms. Sera Diaz  has a past medical history of Arthritis, Dependent edema, GERD (gastroesophageal reflux disease), Headache, Inguinal hernia recurrent bilateral, Stasis dermatitis of both legs, and Thyroid disease. She also has no past medical history of CAD (coronary artery disease), Cancer ([facility] [address] or Stroke ([facility] [address] .  She also  has a past surgical history that includes hx gyn; hx heent; hx orthopaedic; hx endoscopy; hx colonoscopy; and hx gi. Present Symptoms: Oropharyngeal dysphagia- No symptoms identified Pain Scale 1: Numeric (0 - 10) Pain Intensity 1: 0 Current Medications: No current facility-administered medications on file prior to encounter. Current Outpatient Medications on File Prior to Encounter Medication Sig Dispense Refill  levothyroxine (SYNTHROID) 75 mcg tablet Take 1 tablet by mouth 5 days a week. Indications: hypothyroidism (Patient taking differently: [id number] tablet by mouth 5 days a week. On Tuesday and Thursday only takes 50mcg tablet.) 60 Tab 3 Current Dietary Status:   
 Regular/thin History of reflux:  YES  
? Reflux medication: PRN antacids Social History/Home Situation:   
Home Environment: Independent living([place]) One/Two Story Residence: One story Living Alone: Yes Support Systems: Child(luma) Patient Expects to be Discharged to[de-identified] Unknown Current DME Used/Available at Home: None OBJECTIVE:  
Respiratory Status:       
 
Oral Motor Structure/Speech:  Oral-Motor Structure/Motor Speech Labial: No impairment Dentition: Intact Lingual: No impairment Cognitive and Communication Status: 
Neurologic State: Alert Orientation Level: Oriented X4 Cognition: Follows commands; Appropriate for age attention/concentration Perception: Appears intact Perseveration: No perseveration noted Safety/Judgement: Awareness of environment BEDSIDE SWALLOW EVALUATIONOral Assessment: 
Oral Assessment Labial: No impairment Dentition: Intact Lingual: No impairment P.O. Trials: 
Patient Position: Upright in bed The patient was given tsp-small bite amounts of the following:  
Consistency Presented: Thin liquid; Solid;Puree;Mixed consistency How Presented: Self-fed/presented;Cup/sip;Spoon;Straw;Successive swallows ORAL PHASE: 
Bolus Acceptance: No impairment Bolus Formation/Control: No impairment Propulsion: No impairment Oral Residue: None PHARYNGEAL PHASE: 
Initiation of Swallow: No impairment Laryngeal Elevation: Functional 
Aspiration Signs/Symptoms: None Vocal Quality: No impairment Effective Modifications: None Pharyngeal Phase Characteristics: No impairment, issues, or problems OTHER OBSERVATIONS: 
Rate/bite size: WNL Endurance: WNL Comments: Tool Used: Dysphagia Outcome and Severity Scale (BASILIA) Score Comments Normal Diet  [x] 7 With no strategies or extra time needed Functional Swallow  [] 6 May have mild oral or pharyngeal delay Mild Dysphagia 
  [] 5 Which may require one diet consistency restricted (those who demonstrate penetration which is entirely cleared on MBS would be included) Mild-Moderate Dysphagia  [] 4 With 1-2 diet consistencies restricted Moderate Dysphagia  [] 3 With 2 or more diet consistencies restricted Moderately Severe Dysphagia  [] 2 With partial PO strategies (trials with ST only) Severe Dysphagia  [] 1 With inability to tolerate any PO safely Score:  Initial: 7 Most Recent: X (Date: --) Interpretation of Tool: The Dysphagia Outcome and Severity Scale (BASILIA) is a simple, easy-to-use, 7-point scale developed to systematically rate the functional severity of dysphagia based on objective assessment and make recommendations for diet level, independence level, and type of nutrition. [place] / [place] / Product Type: Medicare /  
 
TREATMENT:  
 (In addition to Assessment/Re-Assessment sessions the following treatments were rendered) Assessment/Reassessment only, no treatment provided today MODALITIES:  
  
  
  
  
  
  
  
  
  
  
    
  
  
  
  
  
  
  
  
   
 
ORAL MOTOR  EXERCISES: 
  
  
  
  
  
  
  
  
  
  
  
  
  
  
  
  
  
  
  
  
  
  
  
  
  
  
    
  
  
  
  
  
  
  
  
  
  
  
  
  
  
  
  
  
  
  
  
  
  
  
  
  
   
 
LARYNGEAL / PHARYNGEAL EXERCISES: 
  
  
  
  
  
  
  
  
  
  
  
  
  
  
  
  
  
  
  
  
  
    
  
  
  
  
  
  
  
  
  
  
  
  
  
  
  
  
  
  
  
   
 
__________________________________________________________________________________________________ Safety: After treatment position/precautions: · Upright in Bed. Recommendations/Intent for next treatment session:No further speech therapy indicated at this time as swallow function is within normal limits. Please re-consult if new concerns arise. Total Treatment Duration: 
Time In: 2522 Time Out: 2694 [address] CCC-SLP

## 2019-02-20 NOTE — ROUTINE PROCESS
Order was put in at 3pm and floor was called to start iv hydration at that time. Patient was also for an MRI also today. CTA head/neck was ordered for CT  IV hydration was not started on 1st shift by the 1st shift RN. Evening RN called at 7:30 to start iv hydration and will plan on bringing patient down at 10:30 for scan.

## 2019-02-20 NOTE — PROGRESS NOTES
Patient voices no concerns at this time. Patient relaxing in recliner watching TV. Patient denies any pain at this time and appears comfortable. Call light within reach and patient instructed to call if assistance is needed. Will continue to monitor.

## 2019-02-20 NOTE — PROGRESS NOTES
Patient stable with no complaints at this time. Patient resting in bed eating dinner. Patient denies any pain and appears comfortable at this time. Patient had duplex on carotid arteries today with less than 50% stenosis bilaterally. Patient is relaxed and has no s/s of discomfort. Safety measures in place. Patient is very pleasant. Call light within reach and patient instructed to call if assistance is needed. Report to be given to oncoming RN 7p-7a

## 2019-02-20 NOTE — PROGRESS NOTES
Patient resting in bed with no complaints at this time. Patient is alert and orientated with no distress noted. IV intact and patent with no s/s of infection noted. Respirations even and unlabored with heart rate regular. Patient unable to ambulate independently without assistance; needs x1 r/t weakness and left foot and ankle pain. Bed in low locked position with call light within reach. Patient instructed to call if assistance is needed. Will continue to monitor.

## 2019-02-20 NOTE — PROGRESS NOTES
Problem: Nutrition Deficit Goal: *Optimize nutritional status Nutrition Reason for assessment: Diet Education Consult: suspected stroke. Candice Woodson NP) Assessment:  
Diet order(s): RegularFood/Nutrition Patient History:  The patient has a h/o GERD. She states that she does not take any medication for her GERD but prefers to control it with diet. She states that her appetite prior to admission was very good. She states that she eats well at her skilled nursing. She denies any weight loss and states that she stays around 100 lbs and does not really get below 100 lbs. She does not use any oral nutrition supplements but does inquire if she should be drinking one. RD discussed ONS and the use for if she starts to have a decline in appetite or if she starts loosing weight. At this point an ONS is not needed. Anthropometrics:Height: 4' 9\" (144.8 cm),  Weight: 46.1 kg (101 lb 9.6 oz), Weight Source: Standing scale (comment), Body mass index is 20.52 kg/m². BMI class of underweight for age >71. Macronutrient needs: EER:  0995-5666 kcal /day (30-35 kcal/kg usual BW) EPR:  46-58 grams protein/day (1-1.25 grams/kg usual BW) Intake/Comparative Standards: No recorded po intakes at this time. Nutrition Diagnosis: No acute nutrition related diagnosis at this time. Intervention: 
Meals and snacks: Continue current diet. Nutrition Supplement Therapy: None at this time Nutrition Discharge Plan: No nutrition needs identified for discharge. [place] [address], [address], [phone]

## 2019-02-20 NOTE — PROGRESS NOTES
Problem: Falls - Risk of 
Goal: *Absence of Falls Document Talat Mendez Fall Risk and appropriate interventions in the flowsheet. Outcome: Progressing Towards Goal 
Fall Risk Interventions: 
Mobility Interventions: Communicate number of staff needed for ambulation/transfer, Bed/chair exit alarm, Assess mobility with egress test, Patient to call before getting OOB, Strengthening exercises (ROM-active/passive), PT Consult for assist device competence, PT Consult for mobility concerns, OT consult for ADLs Medication Interventions: Evaluate medications/consider consulting pharmacy Elimination Interventions: Call light in reach, Patient to call for help with toileting needs History of Falls Interventions: Bed/chair exit alarm, Consult care management for discharge planning, Evaluate medications/consider consulting pharmacy, Investigate reason for fall, Door open when patient unattended

## 2019-02-20 NOTE — PROGRESS NOTES
Patient admitted yesterday afternoon after two falls at her shelter with reported Left side weakness and knee pain. She is being evaluated for stroke. Patient lives at [facility] in the [facility] portion. She uses no cane or walker at baseline. If patient requires therapy at discharge, outpatient PT/OT/ST can be arranged through [facility], or she can move to their SNF for short-term rehab prior to return to the [facility] if needed. Case Management will continue to follow. Care Management Interventions PCP Verified by CM: Yes 
Palliative Care Criteria Met (RRAT>21 & CHF Dx)?: No 
Mode of Transport at Discharge: [facility] Transition of Care Consult (CM Consult): Discharge Planning Discharge Durable Medical Equipment: No 
Physical Therapy Consult: Yes Occupational Therapy Consult: Yes Speech Therapy Consult: Yes Current Support Network: Assisted Living([facility] [employer] [facility] [employer]) Confirm Follow Up Transport: Family Plan discussed with Pt/Family/Caregiver: Yes Freedom of Choice Offered: Yes [employer] Information Provided?: No 
Discharge Location Discharge Placement: Assisted Living

## 2019-02-20 NOTE — PROGRESS NOTES
Problem: Mobility Impaired (Adult and Pediatric) Goal: *Acute Goals and Plan of Care (Insert Text) LTG: 
(1.)Ms. Coleen Medel will move from supine to sit and sit to supine , scoot up and down and roll side to side in bed with SUPERVISION within 7 treatment day(s). (2.)Ms. Coleen Medel will transfer from bed to chair and chair to bed with SUPERVISION using the least restrictive device within 7 treatment day(s). (3.)Ms. Coleen Medel will ambulate with STAND BY ASSIST for 250+ feet with the least restrictive device within 7 treatment day(s). ________________________________________________________________________________________________ PHYSICAL THERAPY: Initial Assessment and AM 2/20/2019INPATIENT:   
[place] / Plan[place] PART A AND B / Product Type: Medicare /   
  
NAME/AGE/GENDER: Robin Dela Cruz is a 80 y.o. female PRIMARY DIAGNOSIS: Weakness [R53.1] <principal problem not specified> <principal problem not specified> 
 
  
ICD-10: Treatment Diagnosis:  
 · Generalized Muscle Weakness (M62.81) · Difficulty in walking, Not elsewhere classified (R26.2) Precaution/Allergies: 
Amoxicillin; Azithromycin; Ceftriaxone; Cefuroxime; Celecoxib; [place]; Keflex [cephalexin]; Metronidazole; and Sulfamethoxazole-trimethoprim ASSESSMENT:  
Ms. Coleen Medel presents supine at arrival, pleasant and willing to participate. She is 80 and has all her faculties. She describes fall event as a sudden loss of strength dropping her to floor. She had a few minutes warning of 'not feeling right' followed by weakness drop to floor. She is very aware of event and can describe well. Her description follows a pattern of possible crisis of [place]. When going to stand she still needs her UEs to help even she attempted without. Her ambulation is in trunk flex and difficulty advancing LEs possibly due to her described weakness.   She also states she has sore L knee and swollen L foot. Her impairments include decreased functional mobility, decreased balance, decreased strength, decreased safety awareness, increased risk for falls. Pt would benefit from further PT while in house to address these impairments to help improve to prior level of independence. Discussed possible use of RW at home, pt agreed to try. This section established at most recent assessment PROBLEM LIST (Impairments causing functional limitations): 1. Decreased Strength 2. Decreased ADL/Functional Activities 3. Decreased Transfer Abilities 4. Decreased Ambulation Ability/Technique 5. Decreased Balance 6. Decreased Activity Tolerance 7. Increased Fatigue 8. Decreased Flexibility/Joint Mobility INTERVENTIONS PLANNED: (Benefits and precautions of physical therapy have been discussed with the patient.) 1. Balance Exercise 2. Bed Mobility 3. Gait Training 4. Neuromuscular Re-education/Strengthening 5. Range of Motion (ROM) 6. Therapeutic Activites 7. Therapeutic Exercise/Strengthening 8. Transfer Training TREATMENT PLAN: Frequency/Duration: 3 times a week for duration of hospital stay Rehabilitation Potential For Stated Goals: Excellent RECOMMENDED REHABILITATION/EQUIPMENT: (at time of discharge pending progress): Due to the probability of continued deficits (see above) this patient will likely need continued skilled physical therapy after discharge. Equipment:  
? Walkers, Type: [place] HISTORY:  
History of Present Injury/Illness (Reason for Referral): 
See h/p Past Medical History/Comorbidities: Ms. Lemuel Aparicio  has a past medical history of Arthritis, Dependent edema, GERD (gastroesophageal reflux disease), Headache, Inguinal hernia recurrent bilateral, Stasis dermatitis of both legs, and Thyroid disease.  She also has no past medical history of CAD (coronary artery disease), Cancer [facility]), or Stroke ([facility] [address] Ms. Kamryn Clark  has a past surgical history that includes hx gyn; hx heent; hx orthopaedic; hx endoscopy; hx colonoscopy; and hx gi. Social History/Living Environment:  
Home Environment: Independent living One/Two Story Residence: Two story Living Alone: Yes Support Systems: Assisted living Patient Expects to be Discharged to[de-identified] Assisted living Current DME Used/Available at Home: None Prior Level of Function/Work/Activity: 
Pt lives in [facility]. She does not use any AD and she uses stairs to go to dining room instead of elevator for exercise. Pt is very sharp and aware of environment. Number of Personal Factors/Comorbidities that affect the Plan of Care: 0: LOW COMPLEXITY EXAMINATION:  
Most Recent Physical Functioning:  
Gross Assessment: 
AROM: Generally decreased, functional 
Strength: Within functional limits Coordination: Within functional limits Tone: Normal 
Sensation: Intact Posture: 
Posture (WDL): Exceptions to [facility] Posture Assessment: Trunk flexion Balance: 
Sitting: Intact Standing: Impaired Standing - Static: Fair Standing - Dynamic : Fair Bed Mobility: 
Rolling: Independent Supine to Sit: Independent Scooting: Independent Wheelchair Mobility: 
  
Transfers: 
Sit to Stand: Contact guard assistance Stand to Sit: Contact guard assistance Gait: 
  
Base of Support: Narrowed; Center of gravity altered;Shift to right Gait Abnormalities: Decreased step clearance;Shuffling gait Distance (ft): 18 Feet (ft) Assistive Device: Walker, rolling Ambulation - Level of Assistance: Minimal assistance Body Structures Involved: 1. Nerves 2. Heart 3. Joints 4. Muscles 5. Ligaments Body Functions Affected: 1. Mental 
2. Cardio 3. Respiratory 4. Neuromusculoskeletal 
5. Movement Related Activities and Participation Affected: 1. General Tasks and Demands 2. Mobility 3. Self Care 4. Domestic Life Number of elements that affect the Plan of Care: 1-2: LOW COMPLEXITY CLINICAL PRESENTATION:  
Presentation: Stable and uncomplicated: LOW COMPLEXITY CLINICAL DECISION MAKIN [facility] [address] [address] AM-PAC 6 Clicks Basic Mobility Inpatient Short Form How much difficulty does the patient currently have. .. Unable A Lot A Little None 1. Turning over in bed (including adjusting bedclothes, sheets and blankets)? [] 1   [] 2   [] 3   [x] 4  
2. Sitting down on and standing up from a chair with arms ( e.g., wheelchair, bedside commode, etc.)   [] 1   [] 2   [x] 3   [] 4  
3. Moving from lying on back to sitting on the side of the bed? [] 1   [] 2   [] 3   [x] 4 How much help from another person does the patient currently need. .. Total A Lot A Little None 4. Moving to and from a bed to a chair (including a wheelchair)? [] 1   [] 2   [x] 3   [] 4  
5. Need to walk in hospital room? [] 1   [] 2   [x] 3   [] 4  
6. Climbing 3-5 steps with a railing? [] 1   [x] 2   [] 3   [] 4  
© , Trustees of [address] [facility] [address] [address], under license to [employer]. All rights reserved Score:  Initial: 19 Most Recent: X (Date: -- ) Interpretation of Tool:  Represents activities that are increasingly more difficult (i.e. Bed mobility, Transfers, Gait). Medical Necessity:    
· Skilled intervention continues to be required due to decreased function. Reason for Services/Other Comments: 
· Patient continues to require skilled intervention due to medical complications and patient unable to attend/participate in therapy as expected. Use of outcome tool(s) and clinical judgement create a POC that gives a: Clear prediction of patient's progress: LOW COMPLEXITY  
  
 
 
 
TREATMENT:  
(In addition to Assessment/Re-Assessment sessions the following treatments were rendered) Pre-treatment Symptoms/Complaints: Im weak Pain: Initial:  
Pain Intensity 1: 0  Post Session:  none Assessment/Reassessment only, no treatment provided today Braces/Orthotics/Lines/Etc:  
· O2 Device: Room air Treatment/Session Assessment:   
· Response to Treatment:  See above · Interdisciplinary Collaboration:  
o Physical Therapist 
o Registered Nurse · After treatment position/precautions:  
o Up in chair 
o Call light within reach 
o RN notified · Compliance with Program/Exercises: Will assess as treatment progresses · Recommendations/Intent for next treatment session: \"Next visit will focus on advancements to more challenging activities and reduction in assistance provided\". Total Treatment Duration: PT Patient Time In/Time Out Time In: 5919 Time Out: 1025 Amalia Aaron DPT

## 2019-02-20 NOTE — PROGRESS NOTES
Hospitalist Progress Note 2019 Admit Date: 2019  9:41 AM  
NAME: Augusto Steinberg :  1920 MRN:  [id number] Attending: Adrienne Jimenes MD 
PCP:  Elke Acuna MD 
 
SUBJECTIVE:  
Patient is a 79 yo female with PMH of GERD and acquired hypothyroidism who came into the ER with complaints of weakness causing her to fall last night and this morning. Patient with left sided knee pain and edematous left foot with bruising. ROM not limited. Xrays without acute process. Patient states she had a headache last night behind her left ear, but denies CP, SOB, visual disturbances, recent illness. Patient reports this happened about two months ago, but did not seek medical attention. CT head with supratentorial microischemia. Suggested follow up MRI. Interval History (): patient examined at bedside. No acute events since admission. Still feels a little weak \"but I would say that I feel a little better than yesterday. \" No chest pain, shortness of breath, headache, blurry vision, heart palpitations, skipped beats, abdominal pain, nausea/vomiting, diarrhea, constipation, dysuria, dizziness/vertigo. Review of Systems negative with exception of pertinent positives noted above PHYSICAL EXAM  
 
Visit Vitals /80 Pulse 78 Temp 98.3 °F (36.8 °C) Resp 18 Ht 4' 9\" (1.448 m) Wt 46.1 kg (101 lb 9.6 oz) SpO2 95% BMI 20.52 kg/m² Temp (24hrs), Av.3 °F (36.8 °C), Min:97.5 °F (36.4 °C), Max:99 °F (37.2 °C) Oxygen Therapy O2 Sat (%): 95 % (19 9079) Pulse via Oximetry: 80 beats per minute (19 3311) O2 Device: Room air (19 9954) Intake/Output Summary (Last 24 hours) at 2019 1500 Last data filed at 2019 1314 Gross per 24 hour Intake 480 ml Output 400 ml Net 80 ml General: No acute distress, very pleasant Lungs:  CTA Bilaterally without R/R/W Heart:  Regular rate and rhythm,  No murmur, rub, or gallop Abdomen: Soft, Non distended, Non tender, Positive bowel sounds Extremities: No cyanosis, clubbing or edema Neurologic:  No focal deficits. +5/5 muscle strength UEs and LEs, sensation to light touch intact b/l, finger-to-nose testing unremarkable, CN II thru XII intact b/l ASSESSMENT Active Hospital Problems Diagnosis Date Noted  Weakness 02/19/2019 Plan: # Sudden onset generalized weakness s/p fall, uncertain etiology (?very infrequent arrhythmia?) - MRI brain showing chronic changes without accompanying acute changes - CTA head negative 
- no arrhythmias on telemetry - carotid ultrasound negative - awaiting TTE results 
- PT/OT consults 
- lipid panel and HgbA1c unremarkable - UA negative # Hypothyroidism 
- continue home levothyroxine # GERD 
- continue PPI 
 
F/E/N: no fluids, replete electrolytes as needed, cardiac diet Ppx: heparin SQ for VTE Code Status: DNR Disposition: pending workup as above. Patient wishes to pursue SNF at discharge, can likely go back to [facility] on Friday. PPD ordered. All questions answered. Signed By: Maryann Knight DO February 20, 2019

## 2019-02-21 LAB
MM INDURATION POC: 0 MM (ref 0–5)
MM INDURATION POC: NORMAL MM (ref 0–5)
PPD POC: NEGATIVE NEGATIVE
PPD POC: NEGATIVE NEGATIVE

## 2019-02-21 PROCEDURE — 74011250637 HC RX REV CODE- 250/637: Performed by: NURSE PRACTITIONER

## 2019-02-21 PROCEDURE — 97530 THERAPEUTIC ACTIVITIES: CPT

## 2019-02-21 PROCEDURE — 97110 THERAPEUTIC EXERCISES: CPT

## 2019-02-21 PROCEDURE — 65660000000 HC RM CCU STEPDOWN

## 2019-02-21 PROCEDURE — 74011250636 HC RX REV CODE- 250/636: Performed by: NURSE PRACTITIONER

## 2019-02-21 PROCEDURE — 97165 OT EVAL LOW COMPLEX 30 MIN: CPT

## 2019-02-21 RX ADMIN — Medication 10 ML: at 14:16

## 2019-02-21 RX ADMIN — LEVOTHYROXINE SODIUM 75 MCG: 75 TABLET ORAL at 06:07

## 2019-02-21 RX ADMIN — ASPIRIN 81 MG 81 MG: 81 TABLET ORAL at 09:02

## 2019-02-21 RX ADMIN — HEPARIN SODIUM 5000 UNITS: 5000 INJECTION INTRAVENOUS; SUBCUTANEOUS at 14:15

## 2019-02-21 RX ADMIN — PANTOPRAZOLE SODIUM 40 MG: 40 TABLET, DELAYED RELEASE ORAL at 06:07

## 2019-02-21 RX ADMIN — ATORVASTATIN CALCIUM 40 MG: 40 TABLET, FILM COATED ORAL at 21:32

## 2019-02-21 RX ADMIN — Medication 5 ML: at 06:12

## 2019-02-21 RX ADMIN — HEPARIN SODIUM 5000 UNITS: 5000 INJECTION INTRAVENOUS; SUBCUTANEOUS at 06:07

## 2019-02-21 RX ADMIN — Medication 10 ML: at 21:33

## 2019-02-21 RX ADMIN — HEPARIN SODIUM 5000 UNITS: 5000 INJECTION INTRAVENOUS; SUBCUTANEOUS at 21:32

## 2019-02-21 NOTE — PROGRESS NOTES
Pt is sitting up in bed, watching tv. Pt voices no complaints or concerns at this time. Pt appears in no acute distress. SBAR end of shift report given to oncoming RN.

## 2019-02-21 NOTE — PROGRESS NOTES
SBAR shift report received from [place], RN. Pt stable. Assessment complete. Pt is lying in bed. Resp even, unlabored. Pt is alert, orient X 4. Pt appears in no acute distress at this time. Pt is on RA. Pt L ankle noted to be swollen and bruised. Pt voices no complaints or concerns at this time. Pt encouraged to call for assistance, call light in reach. Safety measures in place. Will continue to monitor

## 2019-02-21 NOTE — PROGRESS NOTES
Patient accepted for short-term rehab at [facility]. She will need three midnights of inpatient stay before she is eligible. Case Management will continue to follow. Care Management Interventions PCP Verified by CM: Yes 
Palliative Care Criteria Met (RRAT>21 & CHF Dx)?: No 
Mode of Transport at Discharge: [facility] Transition of Care Consult (CM Consult): Discharge Planning, SNF Discharge Durable Medical Equipment: No 
Physical Therapy Consult: Yes Occupational Therapy Consult: Yes Speech Therapy Consult: Yes Current Support Network: Assisted Living([facility] [email or web address] [facility] [employer]) Confirm Follow Up Transport: Family Plan discussed with Pt/Family/Caregiver: Yes Freedom of Choice Offered: Yes [employer] Information Provided?: No 
Discharge Location Discharge Placement: Rehab Unit Subacute

## 2019-02-21 NOTE — PROGRESS NOTES
Problem: Mobility Impaired (Adult and Pediatric) Goal: *Acute Goals and Plan of Care (Insert Text) LTG: 
(1.)Ms. Nancy Prajapati will move from supine to sit and sit to supine , scoot up and down and roll side to side in bed with SUPERVISION within 7 treatment day(s). (2.)Ms. Nancy Prajapati will transfer from bed to chair and chair to bed with SUPERVISION using the least restrictive device within 7 treatment day(s). (3.)Ms. Nancy Prajapati will ambulate with STAND BY ASSIST for 250+ feet with the least restrictive device within 7 treatment day(s). ________________________________________________________________________________________________ PHYSICAL THERAPY: Daily Note and AM 2/21/2019INPATIENT: PT Visit Days : 1 [place] / Product Type: Medicare /   
  
NAME/AGE/GENDER: Mariely Saldivar is a 80 y.o. female PRIMARY DIAGNOSIS: Weakness [R53.1] <principal problem not specified> <principal problem not specified> 
 
  
ICD-10: Treatment Diagnosis:  
 · Generalized Muscle Weakness (M62.81) · Difficulty in walking, Not elsewhere classified (R26.2) Precaution/Allergies: 
Amoxicillin; Azithromycin; Ceftriaxone; Cefuroxime; Celecoxib; [place]; Keflex [cephalexin]; Metronidazole; and Sulfamethoxazole-trimethoprim ASSESSMENT:  
Ms. Nancy Prajapati presents sitting in chair and willing to participate. She is 80 and has all her faculties. She describes fall event as a sudden loss of strength dropping her to floor. She had a few minutes warning of 'not feeling right' followed by weakness drop to floor. She is very aware of event and can describe well. Patient performed seated exercises below then walked about 30 feet 3 times. She reports her L leg/ ankle feel much better than yesterday. Good progress and should be going to rehab where she should do well tomorrow. This section established at most recent assessment PROBLEM LIST (Impairments causing functional limitations): 1. Decreased Strength 2. Decreased ADL/Functional Activities 3. Decreased Transfer Abilities 4. Decreased Ambulation Ability/Technique 5. Decreased Balance 6. Decreased Activity Tolerance 7. Increased Fatigue 8. Decreased Flexibility/Joint Mobility INTERVENTIONS PLANNED: (Benefits and precautions of physical therapy have been discussed with the patient.) 1. Balance Exercise 2. Bed Mobility 3. Gait Training 4. Neuromuscular Re-education/Strengthening 5. Range of Motion (ROM) 6. Therapeutic Activites 7. Therapeutic Exercise/Strengthening 8. Transfer Training TREATMENT PLAN: Frequency/Duration: 3 times a week for duration of hospital stay Rehabilitation Potential For Stated Goals: Excellent RECOMMENDED REHABILITATION/EQUIPMENT: (at time of discharge pending progress): Due to the probability of continued deficits (see above) this patient will likely need continued skilled physical therapy after discharge. Equipment:  
? Walkers, Type: Rolling [employer] HISTORY:  
History of Present Injury/Illness (Reason for Referral): 
See h/p Past Medical History/Comorbidities: Ms. Sandi Guerrero  has a past medical history of Arthritis, Dependent edema, GERD (gastroesophageal reflux disease), Headache, Inguinal hernia recurrent bilateral, Stasis dermatitis of both legs, and Thyroid disease. She also has no past medical history of CAD (coronary artery disease), Cancer ([address] or Stroke ([address] Ms. Sandi Guerrero  has a past surgical history that includes hx gyn; hx heent; hx orthopaedic; hx endoscopy; hx colonoscopy; and hx gi. Social History/Living Environment:  
Home Environment: Independent living One/Two Story Residence: Two story Living Alone: Yes Support Systems: Assisted living Patient Expects to be Discharged to[de-identified] Assisted living Current DME Used/Available at Home: None Prior Level of Function/Work/Activity: Pt lives in [facility]. She does not use any AD and she uses stairs to go to dining room instead of elevator for exercise. Pt is very sharp and aware of environment. Number of Personal Factors/Comorbidities that affect the Plan of Care: 0: LOW COMPLEXITY EXAMINATION:  
Most Recent Physical Functioning:  
Gross Assessment: 
  
         
  
Posture: 
  
Balance: 
Standing - Static: Fair Standing - Dynamic : Fair Bed Mobility: 
  
Wheelchair Mobility: 
  
Transfers: 
Sit to Stand: Contact guard assistance Stand to Sit: Contact guard assistance Gait: 
  
Gait Abnormalities: Antalgic;Decreased step clearance; Steppage gait Distance (ft): 30 Feet (ft)(x3) Assistive Device: Walker, rolling Ambulation - Level of Assistance: Contact guard assistance Body Structures Involved: 1. Nerves 2. Heart 3. Joints 4. Muscles 5. Ligaments Body Functions Affected: 1. Mental 
2. Cardio 3. Respiratory 4. Neuromusculoskeletal 
5. Movement Related Activities and Participation Affected: 1. General Tasks and Demands 2. Mobility 3. Self Care 4. Domestic Life Number of elements that affect the Plan of Care: 1-2: LOW COMPLEXITY CLINICAL PRESENTATION:  
Presentation: Stable and uncomplicated: LOW COMPLEXITY CLINICAL DECISION MAKIN[address] [place] [address] 6 Clicks Basic Mobility Inpatient Short Form How much difficulty does the patient currently have. .. Unable A Lot A Little None 1. Turning over in bed (including adjusting bedclothes, sheets and blankets)? [] 1   [] 2   [] 3   [x] 4  
2. Sitting down on and standing up from a chair with arms ( e.g., wheelchair, bedside commode, etc.)   [] 1   [] 2   [x] 3   [] 4  
3. Moving from lying on back to sitting on the side of the bed? [] 1   [] 2   [] 3   [x] 4 How much help from another person does the patient currently need. .. Total A Lot A Little None 4. Moving to and from a bed to a chair (including a wheelchair)?    [] 1 [] 2   [x] 3   [] 4  
5. Need to walk in hospital room? [] 1   [] 2   [x] 3   [] 4  
6. Climbing 3-5 steps with a railing? [] 1   [x] 2   [] 3   [] 4  
© 2007, Trustees of [address] [place] [address] [address] [id number], under license to [employer]. All rights reserved Score:  Initial: 19 Most Recent: X (Date: -- ) Interpretation of Tool:  Represents activities that are increasingly more difficult (i.e. Bed mobility, Transfers, Gait). Medical Necessity:    
· Skilled intervention continues to be required due to decreased function. Reason for Services/Other Comments: 
· Patient continues to require skilled intervention due to medical complications and patient unable to attend/participate in therapy as expected. Use of outcome tool(s) and clinical judgement create a POC that gives a: Clear prediction of patient's progress: LOW COMPLEXITY  
  
 
 
 
TREATMENT:  
(In addition to Assessment/Re-Assessment sessions the following treatments were rendered) Pre-treatment Symptoms/Complaints: \"I will try\" Pain: Initial:  
Pain Intensity 1: 0  Post Session:  none Therapeutic Activity: (    15 minutes): Therapeutic activities including Chair transfers and Ambulation on level ground to improve mobility, strength, balance and endurance. Required minimal   to promote static and dynamic balance in standing. Therapeutic Exercise: (10 Minutes):  Exercises per grid below to improve mobility and strength. Required minimal visual and verbal cues to promote proper body mechanics. Progressed complexity of movement as indicated. Date: 
2/21/19 Date: 
 Date: Activity/Exercise Parameters Parameters Parameters Seated TKE 20x B Seated AP 20x B Seated marching 20x B Seated hip abd 20x B Braces/Orthotics/Lines/Etc:  
· O2 Device: Room air Treatment/Session Assessment:   
· Response to Treatment:  See above · Interdisciplinary Collaboration:  
o Physical Therapy Assistant 
o Registered Nurse · After treatment position/precautions:  
o Up in chair 
o Bed alarm/tab alert on 
o Bed/Chair-wheels locked 
o Call light within reach 
o RN notified · Compliance with Program/Exercises: Compliant all of the time · Recommendations/Intent for next treatment session: \"Next visit will focus on advancements to more challenging activities and reduction in assistance provided\". Total Treatment Duration: PT Patient Time In/Time Out Time In: 3007 Time Out: 4106 Tammie Trejo, PTA

## 2019-02-21 NOTE — PROGRESS NOTES
Hospitalist Progress Note 2019 Admit Date: 2019  9:41 AM  
NAME: Ulysses Oneal :  1920 MRN:  [id number] Attending: Gaurav Adrian DO 
PCP:  Jenise Prakash MD 
 
SUBJECTIVE:  
Patient is a 81 yo female with PMH of GERD and acquired hypothyroidism who came into the ER with complaints of weakness causing her to fall last night and this morning. Patient with left sided knee pain and edematous left foot with bruising. ROM not limited. Xrays without acute process. Patient states she had a headache last night behind her left ear, but denies CP, SOB, visual disturbances, recent illness. Patient reports this happened about two months ago, but did not seek medical attention. CT head with supratentorial microischemia. Suggested follow up MRI. Interval History (): patient examined at bedside. No acute events over night. Feeling better overall. No chest pain, shortness of breath, headache, blurry vision, heart palpitations, skipped beats, abdominal pain, nausea/vomiting, diarrhea, constipation, dysuria, dizziness/vertigo. Review of Systems negative with exception of pertinent positives noted above PHYSICAL EXAM  
 
Visit Vitals /65 (BP 1 Location: Right arm, BP Patient Position: Sitting) Pulse 76 Temp 97.7 °F (36.5 °C) Resp 18 Ht 4' 9\" (1.448 m) Wt 47 kg (103 lb 9.6 oz) SpO2 97% BMI 20.92 kg/m² Temp (24hrs), Av.4 °F (36.9 °C), Min:97.7 °F (36.5 °C), Max:99 °F (37.2 °C) Oxygen Therapy O2 Sat (%): 97 % (19 1454) Pulse via Oximetry: 80 beats per minute (19 6706) O2 Device: Room air (19 7907) Intake/Output Summary (Last 24 hours) at 2019 1650 Last data filed at 2019 1250 Gross per 24 hour Intake 240 ml Output  Net 240 ml General: No acute distress, very pleasant Lungs:  CTA Bilaterally without R/R/W Heart:  Regular rate and rhythm,  No murmur, rub, or gallop Abdomen: Soft, Non distended, Non tender, Positive bowel sounds Extremities: No cyanosis, clubbing or edema Neurologic:  No focal deficits. +5/5 muscle strength UEs and LEs, sensation to light touch intact b/l, finger-to-nose testing unremarkable, CN II thru XII intact b/l ASSESSMENT Active Hospital Problems Diagnosis Date Noted  Weakness 02/19/2019 Plan: # Sudden onset generalized weakness s/p fall, uncertain etiology as inpatient workup has not been remarkable (?very infrequent arrhythmia?) - MRI brain showing chronic changes without accompanying acute changes - CTA head negative 
- no arrhythmias on telemetry - carotid ultrasound negative - TTE without shunt 
- PT/OT consults 
- lipid panel and HgbA1c unremarkable - UA negative # Hypothyroidism 
- continue home levothyroxine # GERD 
- continue PPI 
 
F/E/N: no fluids, replete electrolytes as needed, cardiac diet Ppx: heparin SQ for VTE Code Status: DNR Disposition: pending workup as above. Patient wishes to pursue SNF at discharge, can likely go back to [facility] on Friday. PPD ordered. All questions answered. Signed By: Nena Melchor DO February 21, 2019

## 2019-02-21 NOTE — PROGRESS NOTES
Bedside report received from [facility] [place]. Assessment completed. Bed is in low and locked position with floor free of objects. Patient AxOx3, and resting quietly in bed. No needs noted at present. Respirations even and non labored. Verbalized understanding to call for needs. Call light within reach. Will continue to monitor pt.

## 2019-02-21 NOTE — PROGRESS NOTES
Bedside report given to [facility] AT [facility], [place]. Pt. Is alert and oriented with no signs of distress or complaints of pain at this time.

## 2019-02-21 NOTE — PROGRESS NOTES
Problem: Self Care Deficits Care Plan (Adult) Goal: 1. Patient will complete lower body bathing and dressing with stand by assistance and adaptive equipment as needed. 2. Patient will complete toileting with stand by assistance and adaptive equipment as needed. 3. Patient will tolerate 20 minutes of OT treatment with up to minimal rest breaks to increase activity tolerance for ADLs. 4. Patient will complete functional transfers with supervision and adaptive equipment as needed. 5. Patient will demonstrate modified independence with therapeutic exercise HEP to increase strength in BUEs for increased safety and independence with functional transfers. 6. Patient will complete functional mobility of household distances with supervision and adaptive equipment as needed. Timeframe: 7 visits OCCUPATIONAL THERAPY: Initial Assessment, Daily Note and AM 2/21/2019INPATIENT: OT Visit Days: 1 [place] / Product Type: Medicare /  
  
NAME/AGE/GENDER: Robin Dela Cruz is a 80 y.o. female PRIMARY DIAGNOSIS:  Weakness [R53.1] <principal problem not specified> <principal problem not specified> 
 
  
ICD-10: Treatment Diagnosis:  
 · Generalized Muscle Weakness (M62.81) · Repeated Falls (R29.6) · History of falling (Z91.81) Precautions/Allergies: 
  Fall precautions Amoxicillin; Azithromycin; Ceftriaxone; Cefuroxime; Celecoxib; [place]; Keflex [cephalexin]; Metronidazole; and Sulfamethoxazole-trimethoprim ASSESSMENT:  
Ms. Coleen Medel is a 80 y.o. female admitted with the above. At baseline, pt lives alone in a 2nd level independent living apartment which she utilizes the stairs to access, and reports independence with ADLs and functional mobility. Pt reports 2 recent falls. Pt observed to have bruising and swelling in L foot.  Upon arrival, pt alert and agreeable to OT evaluation and treatment. BUE assessment revealed AROM WFL and strength generally decreased in BUEs. Pt managed slip on shoes in sitting with SBA. Treatment initiated to include functional transfers, functional mobility, and BUE exercises. Pt completed functional transfers including toilet transfer with Mary Alice and functional mobility within room with CGA to Mary Alice, requiring verbal and manual cueing for RW management. Pt practiced BUE exercises with visual and verbal cueing for posture, technique, and breathing. Pt left seated in chair with BLEs elevated and with call bell within reach. Pt's deficits include decreased strength, activity tolerance, balance, functional mobility and functional transfers, all of which negatively impact safety and independence with ADLs. Abimbola Montiel is currently functioning below baseline and would benefit from continued OT to increase safety and independence with ADLs. Will follow. This section established at most recent assessment PROBLEM LIST (Impairments causing functional limitations): 1. Decreased Strength 2. Decreased ADL/Functional Activities 3. Decreased Transfer Abilities 4. Decreased Ambulation Ability/Technique 5. Decreased Balance 6. Increased Pain 7. Decreased Activity Tolerance 8. Increased Fatigue 9. Decreased Flexibility/Joint Mobility 10. Edema/Girth 11. Decreased Skin Integrity/Hygeine 12. Decreased [place] with Home Exercise Program 
 INTERVENTIONS PLANNED: (Benefits and precautions of occupational therapy have been discussed with the patient.) 1. Activities of daily living training 2. Adaptive equipment training 3. Balance training 4. Clothing management 5. Donning&doffing training 6. Hygiene training 7. Re-evaluation 8. Therapeutic activity 9. Therapeutic exercise TREATMENT PLAN: Frequency/Duration: Follow patient 3x/week to address above goals. Rehabilitation Potential For Stated Goals: Good RECOMMENDED REHABILITATION/EQUIPMENT: (at time of discharge pending progress): Due to the probability of continued deficits (see above) this patient will likely need continued skilled occupational therapy after discharge. Equipment: ? TBD  
    
 
 
 
OCCUPATIONAL PROFILE AND HISTORY:  
History of Present Injury/Illness (Reason for Referral): 
See H&P. Past Medical History/Comorbidities: Ms. Nancy Prajapati  has a past medical history of Arthritis, Dependent edema, GERD (gastroesophageal reflux disease), Headache, Inguinal hernia recurrent bilateral, Stasis dermatitis of both legs, and Thyroid disease. She also has no past medical history of CAD (coronary artery disease), Cancer ([facility] [address] or Stroke ([facility] [address] Ms. Nancy Prajapati  has a past surgical history that includes hx gyn; hx heent; hx orthopaedic; hx endoscopy; hx colonoscopy; and hx gi. Social History/Living Environment:  
Home Environment: Independent living One/Two Story Residence: Other (Comment)(lives in a one level apartment on the second floor) Living Alone: Yes Support Systems: Other (comments)(Independent living) Patient Expects to be Discharged to[de-identified] Rehabilitation facility Current DME Used/Available at Home: None Tub or Shower Type: Shower Prior Level of Function/Work/Activity: At baseline, pt lives alone in a 2nd level independent living apartment which she utilizes the stairs to access, and reports independence with ADLs and functional mobility. Pt reports 2 recent falls. Dominant Side:  
      RIGHT Personal Factors:   
      Sex:  female Age:  80 y.o. Number of Personal Factors/Comorbidities that affect the Plan of Care: Brief history (0):  LOW COMPLEXITY ASSESSMENT OF OCCUPATIONAL PERFORMANCE[de-identified]  
Activities of Daily Living:  
Basic ADLs (From Assessment) Complex ADLs (From Assessment) Feeding: Independent Oral Facial Hygiene/Grooming: Contact guard assistance Bathing: Minimum assistance Upper Body Dressing: Setup Lower Body Dressing: Minimum assistance Toileting: Contact guard assistance Instrumental ADL Meal Preparation: Total assistance Homemaking: Total assistance Grooming/Bathing/Dressing Activities of Daily Living Cognitive Retraining Safety/Judgement: Awareness of environment; Insight into deficits; Fall prevention Functional Transfers Bathroom Mobility: Contact guard assistance Toilet Transfer : Contact guard assistance Lower Body Dressing Assistance Slip on Shoes with Back: Stand-by assistance Position Performed: Seated in chair Bed/Mat Mobility Sit to Stand: Contact guard assistance Most Recent Physical Functioning:  
Gross Assessment: 
AROM: Within functional limits(BUEs) Strength: Generally decreased, functional(BUEs) Coordination: Within functional limits(BUEs) Tone: Normal(BUEs) Sensation: Intact(BUEs to light touch) Posture: 
Posture (WDL): Exceptions to [facility] Posture Assessment: Trunk flexion Balance: 
Sitting: Intact; Without support Standing: Impaired Standing - Static: Constant support;Good Standing - Dynamic : Fair Bed Mobility: 
  
Wheelchair Mobility: 
  
Transfers: 
Sit to Stand: Contact guard assistance Stand to Sit: Contact guard assistance Patient Vitals for the past 6 hrs: 
 BP BP Patient Position SpO2 Pulse 02/21/19 0724 161/68 At rest 94 % 70  
02/21/19 1131 117/61 Sitting 97 % 78 Mental Status Neurologic State: Alert Orientation Level: Oriented X4 Cognition: Appropriate decision making, Appropriate for age attention/concentration, Follows commands Perception: Appears intact Perseveration: No perseveration noted Safety/Judgement: Awareness of environment, Insight into deficits, Fall prevention Physical Skills Involved: 
1. Balance 2. Strength 3. Activity Tolerance 4. Pain (acute) 5. Edema 6.  Skin Integrity Cognitive Skills Affected (resulting in the inability to perform in a timely and safe manner): 1. None Psychosocial Skills Affected: 1. Habits/Routines 2. Environmental Adaptation 3. Self-Awareness 4. Social Roles Number of elements that affect the Plan of Care: 5+:  HIGH COMPLEXITY CLINICAL DECISION MAKING:  
[facility] 6 Clicks Daily Activity Inpatient Short Form How much help from another person does the patient currently need. .. Total A Lot A Little None 1. Putting on and taking off regular lower body clothing? [] 1   [] 2   [x] 3   [] 4  
2. Bathing (including washing, rinsing, drying)? [] 1   [] 2   [x] 3   [] 4  
3. Toileting, which includes using toilet, bedpan or urinal?   [] 1   [] 2   [x] 3   [] 4  
4. Putting on and taking off regular upper body clothing? [] 1   [] 2   [x] 3   [] 4  
5. Taking care of personal grooming such as brushing teeth? [] 1   [] 2   [x] 3   [] 4  
6. Eating meals? [] 1   [] 2   [] 3   [x] 4  
© 2007, Trustees of [facility], under license to [employer]. All rights reserved Score:  Initial: 19 2/21/2019  Most Recent: X (Date: -- ) Interpretation of Tool:  Represents activities that are increasingly more difficult (i.e. Bed mobility, Transfers, Gait). Medical Necessity:    
· Patient demonstrates good rehab potential due to higher previous functional level. Reason for Services/Other Comments: 
· Patient continues to require skilled intervention due to inability to complete ADLs at prior level of independence. Use of outcome tool(s) and clinical judgement create a POC that gives a: MODERATE COMPLEXITY  
 
 
 
TREATMENT:  
(In addition to Assessment/Re-Assessment sessions the following treatments were rendered) Pre-treatment Symptoms/Complaints:   
Pain: Initial:  
Pain Intensity 1: 0  Post Session:  same Therapeutic Exercise: (10 Minutes):  Exercises per grid below to improve mobility, strength, balance, coordination and activity tolerance. Required minimal visual and verbal cues to promote proper body alignment, promote proper body posture, promote proper body mechanics and promote proper body breathing techniques. Progressed resistance and repetitions as indicated. Date: 
2/21/19 Date: 
 Date: Activity/Exercise Parameters YELLOW Theraband Parameters Parameters BUE Shoulder Flexion x15 repetitions BUE Shoulder Horizontal Abduction x15 repetitions BUE Elbow Flexion/Extension x15 repetitions BUE Shoulder External Rotation x15 repetitions Braces/Orthotics/Lines/Etc:  
· Telemetry · O2 Device: Room air Treatment/Session Assessment:   
Response to Treatment:  Tolerated well with good participation · Interdisciplinary Collaboration:  
o Occupational Therapist 
o Registered Nurse · After treatment position/precautions:  
o Up in chair 
o Bed alarm/tab alert on 
o Bed/Chair-wheels locked 
o Call light within reach · Compliance with Program/Exercises: Compliant all of the time. · Recommendations/Intent for next treatment session: \"Next visit will focus on advancements to more challenging activities and reduction in assistance provided\". Total Treatment Duration: OT Patient Time In/Time Out Time In: 6415 Time Out: 1022 Beth Ledezma OTR/L

## 2019-02-22 VITALS
OXYGEN SATURATION: 98 % | BODY MASS INDEX: 22.78 KG/M2 | WEIGHT: 105.6 LBS | HEIGHT: 57 IN | RESPIRATION RATE: 18 BRPM | SYSTOLIC BLOOD PRESSURE: 151 MMHG | HEART RATE: 77 BPM | TEMPERATURE: 98.1 F | DIASTOLIC BLOOD PRESSURE: 80 MMHG

## 2019-02-22 LAB
BACTERIA SPEC CULT: NORMAL
BASOPHILS # BLD: 0 K/UL (ref 0–0.2)
BASOPHILS NFR BLD: 1 % (ref 0–2)
DIFFERENTIAL METHOD BLD: ABNORMAL
EOSINOPHIL # BLD: 0.2 K/UL (ref 0–0.8)
EOSINOPHIL NFR BLD: 3 % (ref 0.5–7.8)
ERYTHROCYTE [DISTWIDTH] IN BLOOD BY AUTOMATED COUNT: 13.1 % (ref 11.9–14.6)
HCT VFR BLD AUTO: 36 % (ref 35.8–46.3)
HGB BLD-MCNC: 11.4 G/DL (ref 11.7–15.4)
IMM GRANULOCYTES # BLD AUTO: 0 K/UL (ref 0–0.5)
IMM GRANULOCYTES NFR BLD AUTO: 0 % (ref 0–5)
LYMPHOCYTES # BLD: 1.5 K/UL (ref 0.5–4.6)
LYMPHOCYTES NFR BLD: 22 % (ref 13–44)
MCH RBC QN AUTO: 29.9 PG (ref 26.1–32.9)
MCHC RBC AUTO-ENTMCNC: 31.7 G/DL (ref 31.4–35)
MCV RBC AUTO: 94.5 FL (ref 79.6–97.8)
MONOCYTES # BLD: 0.9 K/UL (ref 0.1–1.3)
MONOCYTES NFR BLD: 14 % (ref 4–12)
NEUTS SEG # BLD: 4 K/UL (ref 1.7–8.2)
NEUTS SEG NFR BLD: 61 % (ref 43–78)
NRBC # BLD: 0 K/UL (ref 0–0.2)
PLATELET # BLD AUTO: 239 K/UL (ref 150–450)
PMV BLD AUTO: 9.7 FL (ref 9.4–12.3)
RBC # BLD AUTO: 3.81 M/UL (ref 4.05–5.2)
SERVICE CMNT-IMP: NORMAL
WBC # BLD AUTO: 6.5 K/UL (ref 4.3–11.1)

## 2019-02-22 PROCEDURE — 85025 COMPLETE CBC W/AUTO DIFF WBC: CPT

## 2019-02-22 PROCEDURE — 36415 COLL VENOUS BLD VENIPUNCTURE: CPT

## 2019-02-22 PROCEDURE — 74011250637 HC RX REV CODE- 250/637: Performed by: NURSE PRACTITIONER

## 2019-02-22 PROCEDURE — 74011250636 HC RX REV CODE- 250/636: Performed by: NURSE PRACTITIONER

## 2019-02-22 RX ORDER — ATORVASTATIN CALCIUM 40 MG/1
40 TABLET, FILM COATED ORAL
Qty: 30 TAB | Refills: 0 | Status: SHIPPED
Start: 2019-02-22 | End: 2019-03-11 | Stop reason: ALTCHOICE

## 2019-02-22 RX ORDER — GUAIFENESIN 100 MG/5ML
81 LIQUID (ML) ORAL DAILY
Qty: 30 TAB | Refills: 0 | Status: SHIPPED
Start: 2019-02-23 | End: 2019-03-25

## 2019-02-22 RX ADMIN — PANTOPRAZOLE SODIUM 40 MG: 40 TABLET, DELAYED RELEASE ORAL at 05:30

## 2019-02-22 RX ADMIN — ACETAMINOPHEN 650 MG: 325 TABLET, FILM COATED ORAL at 02:53

## 2019-02-22 RX ADMIN — HEPARIN SODIUM 5000 UNITS: 5000 INJECTION INTRAVENOUS; SUBCUTANEOUS at 05:30

## 2019-02-22 RX ADMIN — Medication 5 ML: at 05:31

## 2019-02-22 RX ADMIN — ASPIRIN 81 MG 81 MG: 81 TABLET ORAL at 08:41

## 2019-02-22 RX ADMIN — LEVOTHYROXINE SODIUM 75 MCG: 75 TABLET ORAL at 05:30

## 2019-02-22 NOTE — PROGRESS NOTES
SBAR shift report received from [place], RN. Pt stable. Assessment complete. Pt is lying in bed. Resp even, unlabored. Pt is alert, orient X 4. Pt appears in no acute distress at this time. Pt is on RA. Pt L ankle is bruised with +1, pitting edema. Pt voices no complaints or concerns at this time. Pt encouraged to call for assistance, call light in reach. Safety measures in place. Will continue to monitor

## 2019-02-22 NOTE — PROGRESS NOTES
Bedside report given to [facility] AT [facility], RN Pt. Is alert and oriented with no signs of distress or complaints of pain at this time.

## 2019-02-22 NOTE — PROGRESS NOTES
Report called and given to [address] [place] [address] at [facility]. Opportunity for questions given. IV's removed X2. Placed 4X4 and tape on removal sites. Pt tolerated well with no signs of bleeding. Pt sitting up in chair, dressed, with belongings ready. Awaiting  for transportation.

## 2019-02-22 NOTE — PROGRESS NOTES
Bedside report received from [facility]. Assessment completed. Bed is in low and locked position with floor free of objects. Patient AxOx3, and resting quietly in bed. No needs noted at present. Respirations even and non labored. Verbalized understanding to call for needs. Call light within reach. Will continue to monitor pt.

## 2019-02-22 NOTE — DISCHARGE SUMMARY
Hospitalist Discharge Summary Patient ID: 
Tatianna Simpson [id number] 
85 y.o. 
11/12/1920 Admit date: 2/19/2019  9:41 AM 
Discharge date and time: 2/22/2019 Attending: Kenyon Dupree DO 
PCP:  Shayne Grover MD 
Treatment Team: Attending Provider: Kenyon Dupree DO; Utilization Review: Chiquita Ragland RN; Care Manager: Coco Montes De Oca RN 
 
Principal Diagnosis <principal problem not specified> Active Problems: 
  Weakness (2/19/2019) Patient is a 81 yo female with PMH of GERD and acquired hypothyroidism who came into the ER with complaints of weakness causing her to fall last night and this morning. Patient with left sided knee pain and edematous left foot with bruising. ROM not limited. Xrays without acute process. Patient states she had a headache last night behind her left ear, but denies CP, SOB, visual disturbances, recent illness. Patient reports this happened about two months ago, but did not seek medical attention. CT head with supratentorial microischemia. Suggested follow up MRI. Interval History (2/22): patient examined at bedside. No acute overnight events. Feels better overall. No recurrent, sudden onset weakness that brought her to the hospital. No headaches, blurry vision, fevers/chills, chest pain, shortness of breath, abdominal pain. Did have one fever overnight but no she denies sore throat, cough, dysuria. Hospital Course: 
Please refer to the admission H&P for details of presentation. In summary, the patient is admitted for suspected stroke-like symptoms in the setting of sudden onset weakness and fall to the ground. X-ray imaging negative for fractures. CT head in ED showing micro-ischemia. CTA negative and MRI showing micro-ischemia that is likely chronic without any new acute/subacute findings. TTE negative for shunts and carotid duplex negative. PT/OT evaluation and patient will be discharged to SNF.  No obvious etiology for patient's symptoms. She had similar symptoms several months ago that resolved. Could be TIA, added ASA 81 mg and atorvastatin 40 mg to regimen. Recommend one week follow-up with PCP for blood pressure check. During inpatient stay, telemetry not showing any arrhythmias. Details of patient's hospitalization discussed with patient who understands and agrees with hospital discharge with plan as outlined above. Return precautions provided. All questions answered. Significant Diagnostic Studies:  
 
CT scan of the brain 2/19/2019 Scanning was performed within 24 hours of arrival to the facility Comparison:None 
  
Dose reduction techniques used: Automated exposure control, adjustment of the 
mAs and/or kVp according to patient size, standardized low-dose protocol, and/or 
iterative reconstruction technique. 
  
Indication:Headache Findings: The brain parenchyma and ventricular structures are remarkable for 
subcortical and periventricular white matter lucency. . There is normal 
white-grey matter differentiation. There are no mass lesions, hemorrhage, or 
evidence of an acute stroke. The calvarium and the sinuses are unremarkable. IMPRESSION Impression: Supratentorial microischemia. Note: If a subtle CVA is suspected, MRI would be more definitive if clinically warranted. History: Complaints of of weakness causing fall last night and this morning. 
  
Comments: CT ANGIOGRAM OF THE NECK AND CT ANGIOGRAM OF THE [place] was 
obtained following the administration of IV contrast. IV contrast was 
administered to evaluate the arterial vasculature. Reformatted images in the 
coronal and sagittal planes as well as 3-D imaging was obtained and reviewed on 
a dedicated PACS and [address] Radiation reduction dose techniques 
were used for the study. Our CT scanner use one or all of the following- 
Automated exposure control, adjustment of the mA and/or KV according the patient size, iterative reconstruction. All measurements are based upon NASCET criteria 
if appropriate. 
  
Findings: 
  
CT ANGIOGRAM OF THE NECK: 
  
The arch and proximal great vessels are patent. Tortuosity is noted in the 
proximal great vessels 
  The right and left carotid bulbs are patent with minimal calcified plaque 
disease. Degree of stenosis is less than 50%.   
The vertebral arteries are codominant 
  
Chronic changes are noted within the lungs and scarring is present in the 
apices. 
  
The thyroid gland is heterogeneous. 
  
CT ANGIOGRAM OF THE [place]: 
  
The petrous, cavernous, and supraclinoid internal carotid arteries are patent 
with mild atherosclerotic changes. The anterior and middle cerebral arteries are 
patent 
  
The distal vertebral arteries basilar artery and posterior cerebral arteries are 
patent. 
  
No evidence of aneurysm and/or vascular malformation. 
  
The osseous structures demonstrate degenerative changes without aggressive 
lesions. 
  
IMPRESSION IMPRESSION: 
1. No evidence of large vessel occlusion or high-grade stenosis. Generalized 
tortuosity and atherosclerotic changes of the vasculature in the neck and [place] 
of [place] are noted. 
  
 Portable left ankle 2/19/2019 
  
INDICATION: Trauma 
  
3 views remarkable for diffuse soft tissue edema. Ankle mortise is intact and 
there is no fracture or dislocation. 
  
IMPRESSION IMPRESSION: Soft tissue edema LEFT KNEE RADIOGRAPHS, 2/19/2019 
  
CLINICAL HISTORY: Fall last night with diffuse knee pain. 
  
TECHNIQUE:  AP, lateral, and oblique views of the left knee. 
  
FINDINGS: 
  
Three views of the left knee are submitted for evaluation. Alignment of the 
osseous structures is maintained. No acute fractures are seen. A chronic 
appearing calcification is seen adjacent to the medial femoral condyle. No 
significant joint effusion is seen.  Overlying soft tissues are otherwise 
unremarkable in appearance. 
  
 IMPRESSION IMPRESSION: 
1. No acute osseous abnormality of the left knee evident by plain film imaging. 
  
Portable chest x-ray 2/19/2019 
  
INDICATION: Fever 
  
COMPARISON: None 
  
Heart is enlarged. Lung fields are clear. Soft tissues and bony structures are 
unremarkable. 
  
IMPRESSION IMPRESSION: Cardiomegaly, clear lung fields EXAM: MRI brain without contrast. 
ADDITIONAL CLINICAL INFORMATION: Concern for cerebral infarct. Weakness. COMPARISON: None. CONTRAST: None. 
  
FINDINGS: 
No evidence of acute infarct. There is diffuse cerebral atrophy with ex vacuole 
dilatation of the ventricles. There is diffuse scattered subcortical, bilateral 
centrum semiovale, and periventricular T2 and STIR hyperintensities consistent 
with chronic ischemic white matter change. No intracranial mass or midline 
shift. No evidence of intracranial hemorrhage. 
  
The paranasal sinuses, mastoid air cells, and middle ears are clear. The orbital 
contents are within normal limits. No significant osseous or scalp lesions are 
identified. 
  
IMPRESSION IMPRESSION: 
1. No evidence of acute intracranial abnormality. Chronic changes as above. History: Weakness, stroke like symptoms 
  
Sonographic evaluation of the carotid arteries was performed bilaterally 
  
Grayscale imaging on the right demonstrates mild plaque disease at the carotid 
bulb. The velocities and ratios are unremarkable. The right vertebral artery 
demonstrates antegrade flow without evidence of steal. 
  
Grayscale imaging on the left demonstrates mild plaque disease at the carotid 
bulb. The velocities and ratios are unremarkable. The left vertebral artery 
demonstrates antegrade flow without evidence of steal. 
  
IMPRESSION Impression: 1. Less than 50% stenosis of the right internal carotid artery. 2. Less than 50% stenosis of the left internal carotid artery. Labs: Results:  
   
Chemistry Recent Labs  
  02/20/19 
2477 *   
K 3.6 * CO2 26 BUN 14  
CREA 0.89 CA 8.2* AGAP 7  
  
CBC w/Diff Recent Labs  
  02/22/19 
0610 02/20/19 
0515 WBC 6.5 6.5  
RBC 3.81* 3.99* HGB 11.4* 12.1 HCT 36.0 37.2  238 GRANS 61  --   
LYMPH 22  --   
EOS 3  --   
  
Cardiac Enzymes No results for input(s): CPK, CKND1, MARTINA in the last 72 hours. No lab exists for component: [place] Coagulation No results for input(s): PTP, INR, APTT in the last 72 hours. No lab exists for component: INREXT, INREXT Lipid Panel Lab Results Component Value Date/Time Cholesterol, total 157 02/20/2019 05:15 AM  
 HDL Cholesterol 58 02/20/2019 05:15 AM  
 LDL, calculated 81.6 02/20/2019 05:15 AM  
 VLDL, calculated 17.4 02/20/2019 05:15 AM  
 Triglyceride 87 02/20/2019 05:15 AM  
 CHOL/HDL Ratio 2.7 02/20/2019 05:15 AM  
  
BNP No results for input(s): BNPP in the last 72 hours. Liver Enzymes No results for input(s): TP, ALB, TBIL, AP, SGOT, GPT in the last 72 hours. No lab exists for component: DBIL Thyroid Studies Lab Results Component Value Date/Time TSH 4.390 04/16/2018 03:50 PM  
    
 
 
Discharge Exam: 
Visit Vitals /89 (BP 1 Location: Left arm, BP Patient Position: At rest) Pulse 71 Temp 98.1 °F (36.7 °C) Resp 18 Ht 4' 9\" (1.448 m) Wt 47.9 kg (105 lb 9.6 oz) SpO2 94% BMI 21.33 kg/m² General:          No acute distress, very pleasant Lungs:             CTA Bilaterally without R/R/W Heart:              Regular rate and rhythm,  No murmur, rub, or gallop Abdomen:        Soft, Non distended, Non tender, Positive bowel sounds Extremities:     No cyanosis, clubbing or edema Neurologic:      No focal deficits. +5/5 muscle strength UEs and LEs, sensation to light touch intact b/l, finger-to-nose testing unremarkable, CN II thru XII intact b/l Disposition: SNF Discharge Condition: stable Patient Instructions:  
Current Discharge Medication List  
  
 START taking these medications Details  
aspirin 81 mg chewable tablet Take 1 Tab by mouth daily for 30 days. Qty: 30 Tab, Refills: 0  
  
atorvastatin (LIPITOR) 40 mg tablet Take 1 Tab by mouth nightly for 30 days. Qty: 30 Tab, Refills: 0 CONTINUE these medications which have NOT CHANGED Details  
levothyroxine (SYNTHROID) 75 mcg tablet Take 1 tablet by mouth 5 days a week. Indications: hypothyroidism 
Qty: 60 Tab, Refills: 3 Activity: Activity as tolerated Diet: Regular Diet Wound Care: As directed Follow-up ·   With PCP within 1 week Time spent to discharge patient 35 minutes Signed: Luis Bynum DO 
2/22/2019 
10:21 AM

## 2019-02-25 ENCOUNTER — PATIENT OUTREACH (OUTPATIENT)
Dept: CASE MANAGEMENT | Age: 84
End: 2019-02-25

## 2019-02-25 NOTE — PROGRESS NOTES
Patient discharged to a SNF [facility] facility. Patient will be included in weekly care coordination calls.

## 2019-02-27 ENCOUNTER — PATIENT OUTREACH (OUTPATIENT)
Dept: CASE MANAGEMENT | Age: 84
End: 2019-02-27

## 2019-02-27 NOTE — PROGRESS NOTES
Community Care Team documentation for patient in [facility]    The information below provided by:JOSE LUIS 2-27-19    PT Update: PT/OT. Sup for safety with transfers .  Able to ambulate with RW for 250 feet        Nursing Update:New DX of cellulitis       Discharge Date:TBD      Assign to [facility] Manager:MIRZA

## 2019-03-07 ENCOUNTER — HOME HEALTH ADMISSION (OUTPATIENT)
Dept: HOME HEALTH SERVICES | Facility: HOME HEALTH | Age: 84
End: 2019-03-07

## 2019-03-08 ENCOUNTER — PATIENT OUTREACH (OUTPATIENT)
Dept: CASE MANAGEMENT | Age: 84
End: 2019-03-08

## 2019-03-11 PROBLEM — I67.89 CEREBRAL MICROVASCULAR DISEASE: Status: ACTIVE | Noted: 2019-03-11

## 2019-03-13 ENCOUNTER — PATIENT OUTREACH (OUTPATIENT)
Dept: CASE MANAGEMENT | Age: 84
End: 2019-03-13

## 2019-03-21 ENCOUNTER — PATIENT OUTREACH (OUTPATIENT)
Dept: CASE MANAGEMENT | Age: 84
End: 2019-03-21

## 2019-03-21 NOTE — PROGRESS NOTES
Date/Time of Call: 
 3/21/19 @ 11:101m What was the patient hospitalized for? weakness Does the patient understand his/her diagnosis and/or treatment and what happened during the hospitalization? Yes Did the patient receive discharge instructions? Yes  
CM Assessed Risk for Readmission:  
 
 
Patient stated Risk for Readmission: No risk for readmission identified at this time Review any discharge instructions (see discharge instructions/AVS in [facility]). Ask patient if they understand these. Do they have any questions? Voices understanding of discharge instructions Were home services ordered (nursing, PT, OT, ST, etc.)? No 
  
If so, has the first visit occurred? If not, why? (Assist with coordination of services if necessary.) 
 N/A Was any DME ordered? No DME ordered. Patient has a rollator that she uses at home If so, has it been received? If not, why?  (Assist patient in obtaining DME orders &/or equipment if necessary.)  
 
N/A Complete a review of all medications (new, continued and discontinued meds per the D/C instructions and medication tab in [address] [place] [address]). Medications reviewed. She is currently taking prednisone with a follow appointment scheduled with Dr. Dunbar Were all new prescriptions filled? If not, why?  (Assist patient in obtaining medications if necessary  escalate for CCM &/or SW if ongoing issues are verbalized by pt or anticipated) Yes Does the patient understand the purpose and dosing instructions for all medications? (If patient has questions, provide explanation and education.) Yes Does the patient have any problems in performing ADLs? (If patient is unable to perform ADLs  what is the limiting factor(s)? Do they have a support system that can assist? If no support system is present, discuss possible assistance that they may be able to obtain. Escalate for CCM/SW if ongoing issues are verbalized by pt or anticipated) Lives in own apartment in [facility] Does the patient have all follow-up appointments scheduled? 7 day f/up with PCP?  
(f/up with PCP may be w/in 14 days if patient has a f/up with their specialist w/in 7 days) 7-14 day f/up with specialist?  
(or per discharge instructions) If f/up has not been made  what actions has the care coordinator made to accomplish this? Has transportation been arranged? IM on 3/11/19 after being discharged from SNF on 3/8/19 Any other questions or concerns expressed by the patient? None Schedule next appointment with VICKIE CONKLIN Coordinator or refer to RN Case Manager/ per the workflow guidelines. When is care coordinators next follow-up call scheduled? If referred for CCM  what RN care manager was the referral assigned? Within the next 30 days TRENT Call Completed By:  
Lane Ruffin RN This note will not be viewable in [address]

## 2019-03-21 NOTE — PROGRESS NOTES
Referral Source & Reason SNF admission Primary concerns per patient and/or pts family/caregiver Weakness Recent Hospitalization(s)/ED Visits February 2019 Current with Home Health? 
- Agency? No  
Social Needs: - Able to afford medications? - Financial assessment? 
- Access to care? Transportation? Able to afford medications, transportation is available Nutritional Assessment - Appetite? - Obesity? 
- Failure to Thrive? - Bowels ? No identified abnormalities Cognitive Assessment 
- History of dementia 
- Health literacy No h/x dementia She is able to state her medications and conditions Mobility/Activity Assessment - Bed/chair bound? - Make use of assistive devices? - Does patient still drive? Uses a rollator when going to meals at [facility] Plan/Interventions/Education - Follow up Appointments - Referrals Plan: review risk of falls and weakness Education: fall prevention This note will not be viewable in [address]

## 2019-04-24 ENCOUNTER — PATIENT OUTREACH (OUTPATIENT)
Dept: CASE MANAGEMENT | Age: 84
End: 2019-04-24

## 2019-04-25 NOTE — PROGRESS NOTES
Community Care Management  Follow up Outreach Note Outreach type: Phone Phone call: 
  
Date/Time of Outreach: 4/24/19 @ 11:38 am   
 
Reason for follow-up: 
 TRENT follow up Disease specific complaints/issues:  
 She is on prednisone for temporal arteritis with symptom improving including jaw claudication. Her doctor is monitoring her condition. Patient progress towards goals set from last contact: 
 Completed PT Has patient attended any PCP or specialist follow-up appointments since last contact? What was outcome of appointment? When is next follow-up scheduled? Followed up with surgeon No longer needs pain medication Review medications. Any medication changes since last outreach? Does patient have any questions or issues related to their medications? Meds reviewed. Ranitidine added since patient is on steroids and her GERD increased Home health active? If yes  any issue? Progress? No.   
Referrals needed? 
(SW, Diabetes education, HH, etc. ) No  
Other issues/Miscellaneous? (Transportation, access to meals, ability to perform ADLs, adequate caregiver support, etc.) None Next Outreach Scheduled: None Next Steps/Goals: 
 None Community Care Manager: Dominick Foster RN Follow up TRENT. She reports that is doing well and attending her PCP appointments. I left my contact information for her in case she has concerns/questions. This note will not be viewable in [address]

## 2019-05-01 PROBLEM — M31.6 TEMPORAL ARTERITIS (HCC): Status: ACTIVE | Noted: 2019-05-01

## 2019-07-03 PROBLEM — M26.649 TMJ ARTHRITIS: Status: ACTIVE | Noted: 2019-07-03

## 2019-10-07 PROBLEM — R51.9 CHRONIC INTRACTABLE HEADACHE: Status: ACTIVE | Noted: 2019-10-07

## 2019-10-07 PROBLEM — G89.29 CHRONIC INTRACTABLE HEADACHE: Status: ACTIVE | Noted: 2019-10-07

## 2020-09-01 PROBLEM — K40.90 LEFT GROIN HERNIA: Status: ACTIVE | Noted: 2020-09-01

## 2020-10-06 ENCOUNTER — APPOINTMENT (OUTPATIENT)
Dept: CT IMAGING | Age: 85
DRG: 065 | End: 2020-10-06
Attending: EMERGENCY MEDICINE
Payer: MEDICARE

## 2020-10-06 ENCOUNTER — HOSPITAL ENCOUNTER (INPATIENT)
Age: 85
LOS: 3 days | Discharge: NURSING, REHAB OR LONG-TERM CARE | DRG: 065 | End: 2020-10-10
Attending: EMERGENCY MEDICINE | Admitting: FAMILY MEDICINE
Payer: MEDICARE

## 2020-10-06 DIAGNOSIS — I63.9 ACUTE CVA (CEREBROVASCULAR ACCIDENT) (HCC): Primary | ICD-10-CM

## 2020-10-06 PROBLEM — R29.810 FACIAL DROOP: Status: ACTIVE | Noted: 2020-10-06

## 2020-10-06 LAB
ANION GAP SERPL CALC-SCNC: 4 MMOL/L (ref 7–16)
APTT PPP: 33.6 SEC (ref 24.3–35.4)
BUN SERPL-MCNC: 27 MG/DL (ref 8–23)
CALCIUM SERPL-MCNC: 8.8 MG/DL (ref 8.3–10.4)
CHLORIDE SERPL-SCNC: 107 MMOL/L (ref 98–107)
CO2 SERPL-SCNC: 29 MMOL/L (ref 21–32)
CREAT SERPL-MCNC: 0.91 MG/DL (ref 0.6–1)
ERYTHROCYTE [DISTWIDTH] IN BLOOD BY AUTOMATED COUNT: 16.9 % (ref 11.9–14.6)
GLUCOSE SERPL-MCNC: 95 MG/DL (ref 65–100)
HCT VFR BLD AUTO: 36 % (ref 35.8–46.3)
HGB BLD-MCNC: 11.3 G/DL (ref 11.7–15.4)
INR PPP: 1
MCH RBC QN AUTO: 26.4 PG (ref 26.1–32.9)
MCHC RBC AUTO-ENTMCNC: 31.4 G/DL (ref 31.4–35)
MCV RBC AUTO: 84.1 FL (ref 79.6–97.8)
NRBC # BLD: 0 K/UL (ref 0–0.2)
PLATELET # BLD AUTO: 243 K/UL (ref 150–450)
PMV BLD AUTO: 9.8 FL (ref 9.4–12.3)
POTASSIUM SERPL-SCNC: 4.4 MMOL/L (ref 3.5–5.1)
PROTHROMBIN TIME: 13.2 SEC (ref 12–14.7)
RBC # BLD AUTO: 4.28 M/UL (ref 4.05–5.2)
SODIUM SERPL-SCNC: 140 MMOL/L (ref 136–145)
WBC # BLD AUTO: 7.2 K/UL (ref 4.3–11.1)

## 2020-10-06 PROCEDURE — 96372 THER/PROPH/DIAG INJ SC/IM: CPT

## 2020-10-06 PROCEDURE — 85730 THROMBOPLASTIN TIME PARTIAL: CPT

## 2020-10-06 PROCEDURE — 74011250636 HC RX REV CODE- 250/636: Performed by: FAMILY MEDICINE

## 2020-10-06 PROCEDURE — 74011250637 HC RX REV CODE- 250/637: Performed by: FAMILY MEDICINE

## 2020-10-06 PROCEDURE — 2709999900 HC NON-CHARGEABLE SUPPLY

## 2020-10-06 PROCEDURE — 99218 HC RM OBSERVATION: CPT

## 2020-10-06 PROCEDURE — 99284 EMERGENCY DEPT VISIT MOD MDM: CPT

## 2020-10-06 PROCEDURE — 99285 EMERGENCY DEPT VISIT HI MDM: CPT

## 2020-10-06 PROCEDURE — 85610 PROTHROMBIN TIME: CPT

## 2020-10-06 PROCEDURE — 85027 COMPLETE CBC AUTOMATED: CPT

## 2020-10-06 PROCEDURE — 80048 BASIC METABOLIC PNL TOTAL CA: CPT

## 2020-10-06 PROCEDURE — 70450 CT HEAD/BRAIN W/O DYE: CPT

## 2020-10-06 RX ORDER — ENOXAPARIN SODIUM 100 MG/ML
30 INJECTION SUBCUTANEOUS EVERY 24 HOURS
Status: DISCONTINUED | OUTPATIENT
Start: 2020-10-06 | End: 2020-10-10 | Stop reason: HOSPADM

## 2020-10-06 RX ORDER — ATORVASTATIN CALCIUM 40 MG/1
40 TABLET, FILM COATED ORAL
Status: DISCONTINUED | OUTPATIENT
Start: 2020-10-06 | End: 2020-10-07

## 2020-10-06 RX ORDER — SODIUM CHLORIDE 0.9 % (FLUSH) 0.9 %
5-40 SYRINGE (ML) INJECTION AS NEEDED
Status: DISCONTINUED | OUTPATIENT
Start: 2020-10-06 | End: 2020-10-10 | Stop reason: HOSPADM

## 2020-10-06 RX ORDER — ASPIRIN 325 MG
325 TABLET ORAL DAILY
Status: DISCONTINUED | OUTPATIENT
Start: 2020-10-07 | End: 2020-10-07

## 2020-10-06 RX ORDER — FAMOTIDINE 20 MG/1
20 TABLET, FILM COATED ORAL DAILY
Status: DISCONTINUED | OUTPATIENT
Start: 2020-10-07 | End: 2020-10-07

## 2020-10-06 RX ORDER — LEVOTHYROXINE SODIUM 50 UG/1
50 TABLET ORAL
Status: DISCONTINUED | OUTPATIENT
Start: 2020-10-08 | End: 2020-10-10 | Stop reason: HOSPADM

## 2020-10-06 RX ORDER — PREDNISONE 5 MG/1
5 TABLET ORAL DAILY
Status: DISCONTINUED | OUTPATIENT
Start: 2020-10-07 | End: 2020-10-10 | Stop reason: HOSPADM

## 2020-10-06 RX ORDER — SODIUM CHLORIDE 0.9 % (FLUSH) 0.9 %
5-40 SYRINGE (ML) INJECTION EVERY 8 HOURS
Status: DISCONTINUED | OUTPATIENT
Start: 2020-10-06 | End: 2020-10-10 | Stop reason: HOSPADM

## 2020-10-06 RX ORDER — SODIUM CHLORIDE 9 MG/ML
75 INJECTION, SOLUTION INTRAVENOUS CONTINUOUS
Status: DISCONTINUED | OUTPATIENT
Start: 2020-10-06 | End: 2020-10-07

## 2020-10-06 RX ORDER — LEVOTHYROXINE SODIUM 75 UG/1
75 TABLET ORAL
Status: DISCONTINUED | OUTPATIENT
Start: 2020-10-07 | End: 2020-10-10 | Stop reason: HOSPADM

## 2020-10-06 RX ADMIN — ATORVASTATIN CALCIUM 40 MG: 40 TABLET, FILM COATED ORAL at 21:00

## 2020-10-06 RX ADMIN — Medication 5 ML: at 21:03

## 2020-10-06 RX ADMIN — ENOXAPARIN SODIUM 30 MG: 30 INJECTION SUBCUTANEOUS at 21:08

## 2020-10-06 RX ADMIN — SODIUM CHLORIDE 75 ML/HR: 900 INJECTION, SOLUTION INTRAVENOUS at 21:01

## 2020-10-06 NOTE — H&P
HOSPITALIST INITIAL HISTORY AND PHYSICAL 
 
NAME:  Orlando Moore Age:  80 y.o. 
:   1920 MRN:   [id number] PCP: Jess Guzmán MD 
Consulting MD: Treatment Team: Attending Provider: Ty Pittman MD; Primary Nurse: Linda Ellison RN 
 
CHIEF COMPLAINT: confusion, left facial droop HISTORY OF PRESENT ILLNESS:  
Orlando Moore is a 80 y.o. female with a past medical history of previous CVA, GERD, temporal arteritis who presents to the ER with report of confusion and left facial droop starting around 2 PM today per report from SNF. Patient is still confused but does deny any weakness, fevers, chills. Is unable to provide further history REVIEW OF SYSTEMS: Comprehensive ROS performed. Denies fevers, chills, nausea, vomiting, otherwise negative. Past Medical History:  
Diagnosis Date  Arthritis  Cerebral microvascular disease  CT, MRI, Carotid U/S  
 CVA (cerebral vascular accident) ([facility] [address] 10/6/2020  Dependent edema  GERD (gastroesophageal reflux disease)  Headache   
 ocular migraines  Inguinal hernia recurrent bilateral   
 Stasis dermatitis of both legs  Temporal arteritis ([address] right temple- empiric tx with steroids  Thyroid disease   
 hypothyroid  TMJ click   
 bilateral  
  
 
Past Surgical History:  
Procedure Laterality Date  HX COLONOSCOPY    
 HX ENDOSCOPY    
 HH, gastritis,GERD  
 HX GI Antibiotics cause diarrhea  HX GYN Csection  HX HEENT Bilateral Cataract  HX ORTHOPAEDIC    
 wrist surg for fracture Prior to Admission Medications Prescriptions Last Dose Informant Patient Reported? Taking? alum-mag hydroxide-simeth (MYLANTA) 200-200-20 mg/5 mL susp   Yes No  
Sig: Take 30 mL by mouth every four (4) hours as needed. famotidine (PEPCID) 20 mg tablet   No No  
Sig: Take 1 Tab by mouth daily.   
levothyroxine (SYNTHROID) 50 mcg tablet   No No  
 Sig: Take 1 tablet every Tuesday and Thursday. levothyroxine (SYNTHROID) 75 mcg tablet   No No  
Sig: [id number] tablet by mouth 5 days a week. On Tuesday and Thursday only takes 50mcg tablet. Indications: a condition with low thyroid hormone levels  
predniSONE (DELTASONE) 5 mg tablet   No No  
Sig: Take 1 Tab by mouth daily. Facility-Administered Medications: None Allergies Allergen Reactions  Amoxicillin Diarrhea  Azithromycin Diarrhea  Ceftriaxone Diarrhea  Cefuroxime Diarrhea  Celecoxib Diarrhea  Dexilant [Dexlansoprazole] Diarrhea  Keflex [Cephalexin] Diarrhea  Metronidazole Diarrhea  Pepcid [Famotidine] Diarrhea  Sulfamethoxazole-Trimethoprim Diarrhea FAMILY HISTORY: Reviewed. Negative except Family History Problem Relation Age of Onset  Stroke Mother Social History Tobacco Use  Smoking status: Never Smoker  Smokeless tobacco: Never Used Substance Use Topics  Alcohol use: No  
 
 
 
Objective:  
 
Visit Vitals BP (!) 173/117 Pulse 81 Temp 98.5 °F (36.9 °C) Resp 14 SpO2 97% Temp (24hrs), Av.5 °F (36.9 °C), Min:98.5 °F (36.9 °C), Max:98.5 °F (36.9 °C) Oxygen Therapy O2 Sat (%): 97 % (10/06/20 1821) Pulse via Oximetry: 74 beats per minute (10/06/20 1821) Physical Exam: 
General:    The patient is a pleasantly confused elderly female in no acute distress. Head:   Normocephalic/atraumatic. ENT:  External auricular and nasal exam within normal limits. Lungs:   No respiratory distress or accessory muscle use. Abdomen:   non-distended Extremities: Without clubbing, cyanosis, or edema. Skin:     Normal color, texture, and turgor. No rashes, lesions, or jaundice. Neurologic: CN II-XII grossly intact and symmetrical. 
Psychiatric: Appropriate, disoriented Data Review:  
Recent Results (from the past 24 hour(s)) CBC W/O DIFF Collection Time: 10/06/20  5:50 PM  
Result Value Ref Range WBC 7.2 4.3 - 11.1 K/uL  
 RBC 4.28 4.05 - 5.2 M/uL  
 HGB 11.3 (L) 11.7 - 15.4 g/dL HCT 36.0 35.8 - 46.3 % MCV 84.1 79.6 - 97.8 FL  
 MCH 26.4 26.1 - 32.9 PG  
 MCHC 31.4 31.4 - 35.0 g/dL  
 RDW 16.9 (H) 11.9 - 14.6 % PLATELET 641 374 - 956 K/uL MPV 9.8 9.4 - 12.3 FL ABSOLUTE NRBC 0.00 0.0 - 0.2 K/uL METABOLIC PANEL, BASIC Collection Time: 10/06/20  5:50 PM  
Result Value Ref Range Sodium 140 136 - 145 mmol/L Potassium 4.4 3.5 - 5.1 mmol/L Chloride 107 98 - 107 mmol/L  
 CO2 29 21 - 32 mmol/L Anion gap 4 (L) 7 - 16 mmol/L Glucose 95 65 - 100 mg/dL BUN 27 (H) 8 - 23 MG/DL Creatinine 0.91 0.6 - 1.0 MG/DL  
 GFR est AA >60 >60 ml/min/1.73m2 GFR est non-AA >60 >60 ml/min/1.73m2 Calcium 8.8 8.3 - 10.4 MG/DL PROTHROMBIN TIME + INR Collection Time: 10/06/20  5:50 PM  
Result Value Ref Range Prothrombin time 13.2 12.0 - 14.7 sec INR 1.0    
PTT Collection Time: 10/06/20  5:50 PM  
Result Value Ref Range aPTT 33.6 24.3 - 35.4 SEC Imaging /Procedures /Studies: 
Ct Code Neuro Head Wo Contrast 
 
Result Date: 10/6/2020 IMPRESSION: 1. No acute intracranial abnormality. 2. Stable moderate bilateral white matter hypoattenuation most in keeping with chronic microangiopathic disease. 3. Stable atrophy. Assessment and Plan:  
 
Principal Problem: 
  CVA (cerebral vascular accident) ([facility] [address] (10/6/2020) CT head unremarkable. Seen by tele-neuro in ER who did not recommend tPA. MRI brain, TTE in AM. ASA, Lipitor, Lovenox. Consult neuro/rehab/PT/OT/ST. Active Problems: 
  Facial droop (10/6/2020) Per above. Acquired hypothyroidism (3/18/2016) Stable. Continue home meds. Gastroesophageal reflux disease without esophagitis (3/18/2016) Stable. Continue home meds. DVT Prophylaxis: Lovenox Code Status: FULL CODE Disposition: Observe on remote tele for evaluation and treatment as per above. Anticipated discharge: < 2midnights Signed By: Taylor Mortimer, MD   
 October 6, 2020

## 2020-10-06 NOTE — ED PROVIDER NOTES
Antibiotics 80year-old lady presents with concerns about altered mental status and facial droop that started around 2 PM today. She did indicate that she does not have any chest pain or difficulty breathing and that she has no significant headache. No recent known fevers or chills and no cough or shortness of breath. No other associated symptoms. Elements of this note were created using speech recognition software. As such, errors of speech recognition may be present. Past Medical History:  
Diagnosis Date  Arthritis  Cerebral microvascular disease 2019 CT, MRI, Carotid U/S  
 Dependent edema  GERD (gastroesophageal reflux disease)  Headache   
 ocular migraines  Inguinal hernia recurrent bilateral   
 Stasis dermatitis of both legs  Temporal arteritis ([address] right temple- empiric tx with steroids  Thyroid disease   
 hypothyroid  TMJ click 85/2432  
 bilateral  
 
 
Past Surgical History:  
Procedure Laterality Date  HX COLONOSCOPY    
 HX ENDOSCOPY    
 HH, gastritis,GERD  
 HX GI Antibiotics cause diarrhea  HX GYN Csection  HX HEENT Bilateral Cataract  HX ORTHOPAEDIC    
 wrist surg for fracture Family History:  
Problem Relation Age of Onset  Stroke Mother Social History Socioeconomic History  Marital status:  Spouse name: Not on file  Number of children: Not on file  Years of education: Not on file  Highest education level: Not on file Occupational History  Not on file Social Needs  Financial resource strain: Not on file  Food insecurity Worry: Not on file Inability: Not on file  Transportation needs Medical: Not on file Non-medical: Not on file Tobacco Use  Smoking status: Never Smoker  Smokeless tobacco: Never Used Substance and Sexual Activity  Alcohol use: No  
 Drug use: No  
 Sexual activity: Not on file Lifestyle  Physical activity Days per week: Not on file Minutes per session: Not on file  Stress: Not on file Relationships  Social connections Talks on phone: Not on file Gets together: Not on file Attends Anabaptism service: Not on file Active member of club or organization: Not on file Attends meetings of clubs or organizations: Not on file Relationship status: Not on file  Intimate partner violence Fear of current or ex partner: Not on file Emotionally abused: Not on file Physically abused: Not on file Forced sexual activity: Not on file Other Topics Concern  Not on file Social History Narrative  Not on file ALLERGIES: Amoxicillin; Azithromycin; Ceftriaxone; Cefuroxime; Celecoxib; Delma Hancock; Keflex [cephalexin]; Metronidazole; Pepcid [famotidine]; and Sulfamethoxazole-trimethoprim Review of Systems Constitutional: Negative for chills and fever. Respiratory: Negative for cough and shortness of breath. Cardiovascular: Negative for chest pain and palpitations. Gastrointestinal: Negative for nausea and vomiting. Neurological: Positive for speech difficulty and weakness. Negative for dizziness, light-headedness and headaches. There were no vitals filed for this visit. Physical Exam 
Vitals signs and nursing note reviewed. Constitutional:   
   Appearance: Normal appearance. HENT:  
   Head: Normocephalic and atraumatic. Mouth/Throat:  
   Mouth: Mucous membranes are moist.  
Eyes:  
   General:     
   Right eye: No discharge. Left eye: No discharge. Cardiovascular:  
   Rate and Rhythm: Normal rate and regular rhythm. Pulmonary:  
   Effort: Pulmonary effort is normal.  
   Breath sounds: Normal breath sounds. Neurological:  
   Mental Status: She is alert.   
   Comments: Patient has some baseline contractures of her upper extremities was not able to assess her upper extremity strength. She is able to move both feet but both legs are weak which I think is her baseline. She does have some left-sided facial droop with some significant slurred speech. MDM Number of Diagnoses or Management Options Diagnosis management comments: Given her age I think it is unlikely that she will be a TPA candidate but we will do a code stroke. CRITICAL CARE Documentation: This patient is critically ill and there is a high probability of of imminent or life threatening deterioration in the patient's condition without immediate management. The nature of the patient's clinical problem is: Acute CVA I have spent 30 minutes in direct patient care, documentation, review of labs/xrays/old records, discussion with neurologist, hospitalist.  
 
The time involved in the performance of separately reportable procedures was not counted toward critical care time. Tara Shaver MD; 10/6/2020 @6:32 PM 
 
 
 
ED Course as of Oct 06 1833 Tue Oct 06, 2020  
1831 I spoke with the telemetry neurology. At this time she is not a TPA candidate. We will admit for additional stroke testing.  
 [AC] 200 I spoke with the hospitalist who will asked the case along to the 7 PM hospitalist.  
 Lyla Espino ED Course User Index [AC] Cuco Drake MD  
 
 
Procedures

## 2020-10-06 NOTE — ED TRIAGE NOTES
Pt to ER with EMS after staff at [facility] states she was visiting with son and became very confused. Pt is still confused and has a facial droop. Code S called and pt to CT.

## 2020-10-07 ENCOUNTER — APPOINTMENT (OUTPATIENT)
Dept: MRI IMAGING | Age: 85
DRG: 065 | End: 2020-10-07
Attending: FAMILY MEDICINE
Payer: MEDICARE

## 2020-10-07 ENCOUNTER — APPOINTMENT (OUTPATIENT)
Dept: ULTRASOUND IMAGING | Age: 85
DRG: 065 | End: 2020-10-07
Attending: FAMILY MEDICINE
Payer: MEDICARE

## 2020-10-07 LAB
ANION GAP SERPL CALC-SCNC: 5 MMOL/L (ref 7–16)
BUN SERPL-MCNC: 21 MG/DL (ref 8–23)
CALCIUM SERPL-MCNC: 8.4 MG/DL (ref 8.3–10.4)
CHLORIDE SERPL-SCNC: 110 MMOL/L (ref 98–107)
CHOLEST SERPL-MCNC: 194 MG/DL
CO2 SERPL-SCNC: 26 MMOL/L (ref 21–32)
CREAT SERPL-MCNC: 0.89 MG/DL (ref 0.6–1)
ERYTHROCYTE [DISTWIDTH] IN BLOOD BY AUTOMATED COUNT: 16.8 % (ref 11.9–14.6)
EST. AVERAGE GLUCOSE BLD GHB EST-MCNC: 114 MG/DL
GLUCOSE SERPL-MCNC: 83 MG/DL (ref 65–100)
HBA1C MFR BLD: 5.6 %
HCT VFR BLD AUTO: 35 % (ref 35.8–46.3)
HDLC SERPL-MCNC: 74 MG/DL (ref 40–60)
HDLC SERPL: 2.6 {RATIO}
HGB BLD-MCNC: 10.9 G/DL (ref 11.7–15.4)
LDLC SERPL CALC-MCNC: 96.8 MG/DL
LIPID PROFILE,FLP: ABNORMAL
MCH RBC QN AUTO: 26.5 PG (ref 26.1–32.9)
MCHC RBC AUTO-ENTMCNC: 31.1 G/DL (ref 31.4–35)
MCV RBC AUTO: 85 FL (ref 79.6–97.8)
NRBC # BLD: 0 K/UL (ref 0–0.2)
PLATELET # BLD AUTO: 226 K/UL (ref 150–450)
PMV BLD AUTO: 9.6 FL (ref 9.4–12.3)
POTASSIUM SERPL-SCNC: 4 MMOL/L (ref 3.5–5.1)
RBC # BLD AUTO: 4.12 M/UL (ref 4.05–5.2)
SODIUM SERPL-SCNC: 141 MMOL/L (ref 136–145)
TRIGL SERPL-MCNC: 116 MG/DL (ref 35–150)
VLDLC SERPL CALC-MCNC: 23.2 MG/DL (ref 6–23)
WBC # BLD AUTO: 7 K/UL (ref 4.3–11.1)

## 2020-10-07 PROCEDURE — 93306 TTE W/DOPPLER COMPLETE: CPT

## 2020-10-07 PROCEDURE — 80048 BASIC METABOLIC PNL TOTAL CA: CPT

## 2020-10-07 PROCEDURE — 92610 EVALUATE SWALLOWING FUNCTION: CPT

## 2020-10-07 PROCEDURE — 74011250637 HC RX REV CODE- 250/637: Performed by: FAMILY MEDICINE

## 2020-10-07 PROCEDURE — 99218 HC RM OBSERVATION: CPT

## 2020-10-07 PROCEDURE — 74011000302 HC RX REV CODE- 302: Performed by: FAMILY MEDICINE

## 2020-10-07 PROCEDURE — 80061 LIPID PANEL: CPT

## 2020-10-07 PROCEDURE — 93880 EXTRACRANIAL BILAT STUDY: CPT

## 2020-10-07 PROCEDURE — A9270 NON-COVERED ITEM OR SERVICE: HCPCS | Performed by: FAMILY MEDICINE

## 2020-10-07 PROCEDURE — 65270000029 HC RM PRIVATE

## 2020-10-07 PROCEDURE — 99222 1ST HOSP IP/OBS MODERATE 55: CPT | Performed by: PSYCHIATRY & NEUROLOGY

## 2020-10-07 PROCEDURE — 97161 PT EVAL LOW COMPLEX 20 MIN: CPT

## 2020-10-07 PROCEDURE — 74011250636 HC RX REV CODE- 250/636: Performed by: FAMILY MEDICINE

## 2020-10-07 PROCEDURE — 97530 THERAPEUTIC ACTIVITIES: CPT

## 2020-10-07 PROCEDURE — 83036 HEMOGLOBIN GLYCOSYLATED A1C: CPT

## 2020-10-07 PROCEDURE — 74011636637 HC RX REV CODE- 636/637: Performed by: FAMILY MEDICINE

## 2020-10-07 PROCEDURE — 70551 MRI BRAIN STEM W/O DYE: CPT

## 2020-10-07 PROCEDURE — 2709999900 HC NON-CHARGEABLE SUPPLY

## 2020-10-07 PROCEDURE — 97165 OT EVAL LOW COMPLEX 30 MIN: CPT

## 2020-10-07 PROCEDURE — 85027 COMPLETE CBC AUTOMATED: CPT

## 2020-10-07 PROCEDURE — 86580 TB INTRADERMAL TEST: CPT | Performed by: FAMILY MEDICINE

## 2020-10-07 PROCEDURE — 36415 COLL VENOUS BLD VENIPUNCTURE: CPT

## 2020-10-07 RX ORDER — GUAIFENESIN 100 MG/5ML
81 LIQUID (ML) ORAL DAILY
Status: DISCONTINUED | OUTPATIENT
Start: 2020-10-08 | End: 2020-10-10 | Stop reason: HOSPADM

## 2020-10-07 RX ORDER — ASPIRIN 81 MG/1
81 TABLET ORAL DAILY
Qty: 30 TAB | Refills: 0 | Status: SHIPPED | OUTPATIENT
Start: 2020-10-07 | End: 2021-04-07

## 2020-10-07 RX ORDER — FAMOTIDINE 20 MG/1
20 TABLET, FILM COATED ORAL
Status: DISCONTINUED | OUTPATIENT
Start: 2020-10-07 | End: 2020-10-10 | Stop reason: HOSPADM

## 2020-10-07 RX ADMIN — FAMOTIDINE 20 MG: 20 TABLET ORAL at 20:11

## 2020-10-07 RX ADMIN — ASPIRIN 325 MG: 325 TABLET, FILM COATED ORAL at 12:04

## 2020-10-07 RX ADMIN — PREDNISONE 5 MG: 5 TABLET ORAL at 12:05

## 2020-10-07 RX ADMIN — Medication 10 ML: at 06:00

## 2020-10-07 RX ADMIN — ENOXAPARIN SODIUM 30 MG: 30 INJECTION SUBCUTANEOUS at 20:11

## 2020-10-07 RX ADMIN — LEVOTHYROXINE SODIUM 75 MCG: 0.07 TABLET ORAL at 06:01

## 2020-10-07 RX ADMIN — TUBERCULIN PURIFIED PROTEIN DERIVATIVE 5 UNITS: 5 INJECTION, SOLUTION INTRADERMAL at 12:05

## 2020-10-07 NOTE — PROGRESS NOTES
Was to be dc today but patient unsure if she wants rehab or to go back to independent living. She states her son would not be able to stay with her and to help take care of her. She would feel safer if could re-eval discharge tomorrow. PPD ordered.

## 2020-10-07 NOTE — PROGRESS NOTES
TRANSFER - IN REPORT: 
 
Verbal report received from Sarah Simmons RN on Rafael Hammans  being received from [facility] ED for routine progression of care Report consisted of patients Situation, Background, Assessment and  
Recommendations(SBAR). Information from the following report(s) SBAR, Kardex, ED Summary, Procedure Summary, Intake/Output, MAR, Recent Results, Quality Measures and Dual Neuro Assessment was reviewed with the receiving nurse. Opportunity for questions and clarification was provided. Assessment completed upon patients arrival to unit and care assumed.

## 2020-10-07 NOTE — PROGRESS NOTES
SPEECH PATHOLOGY NOTE: 
 
Speech therapy consult received and appreciated. Attempted to see patient this AM for bedside swallow evaluation. Patient having other test completed at this time. Will re-attempt at later time this date as patient becomes available.   
 
 
Jairo Salgado MS, CCC-SLP

## 2020-10-07 NOTE — PROGRESS NOTES
Problem: Mobility Impaired (Adult and Pediatric) Goal: *Acute Goals and Plan of Care (Insert Text) Description: LTG: 
(1.)Ms. Mckenna Chan will move from supine to sit and sit to supine  in bed with INDEPENDENT within 5 treatment day(s). (2.)Ms. Mckenna Chan will transfer from bed to chair and chair to bed with SUPERVISION using the least restrictive device within 5 treatment day(s). (3.)Ms. Mckenna Chan will ambulate with SUPERVISION for 100 feet with the least restrictive device within 5 treatment day(s). ________________________________________________________________________________________________ Outcome: Progressing Towards Goal 
  
PHYSICAL THERAPY: Initial Assessment 10/7/2020 INPATIENT: PT Visit Days : 1 [place] / [place] / Product Type: Medicare /   
  
NAME/AGE/GENDER: Melvi García is a 80 y.o. female PRIMARY DIAGNOSIS: CVA (cerebral vascular accident) ([facility] [I63.9] CVA (cerebral vascular accident) ([facility] [I63.9] CVA (cerebral vascular accident) ([facility]cere[facility] ICD-10: Treatment Diagnosis:  
 Generalized Muscle Weakness (M62.81) Difficulty in walking, Not elsewhere classified (R26.2) Precaution/Allergies: 
Amoxicillin; Azithromycin; Ceftriaxone; Cefuroxime; Celecoxib; Viona Kanaris; Keflex [cephalexin]; Metronidazole; Pepcid [famotidine]; and Sulfamethoxazole-trimethoprim ASSESSMENT:  
 
Ms. Mckenna Chan presents with above diagnosis. Patient demonstrates weakness and impaired balance in sitting and standing. She is typically independent without a device and lives independently in an apartment at [facility]. Patient was able to bridge multiple times in bed to ramona a brace around her hips to manage her hernia. She got to sitting edge of bed with SBA but took increased time and lost her balance posteriorly multiple times while coming to the edge of the bed.   She felt the walker was helpful for mobility in the room. Patient does not feel comfortable returning to her apartment at her current level but stated she does not want skilled nursing. Patient does have some word finding issues and seemed to get confused when discussing different living environments/options at [facility]. Son not present but patient would benefit from some supervision/assistance at d/c. Will continue therapy. This section established at most recent assessment PROBLEM LIST (Impairments causing functional limitations): 
Decreased Strength Decreased ADL/Functional Activities Decreased Transfer Abilities Decreased Ambulation Ability/Technique Decreased Balance INTERVENTIONS PLANNED: (Benefits and precautions of physical therapy have been discussed with the patient.) Balance Exercise Bed Mobility Family Education Gait Training Home Exercise Program (HEP) Therapeutic Activites Therapeutic Exercise/Strengthening TREATMENT PLAN: Frequency/Duration: daily for duration of hospital stay Rehabilitation Potential For Stated Goals: Good REHAB RECOMMENDATIONS (at time of discharge pending progress):   
Placement: It is my opinion, based on this patient's performance to date, that Ms. Cheryl Dow may benefit from participating in 1-2 additional therapy sessions in order to continue to assess for rehab potential and then make recommendation for disposition at discharge. Equipment:  
None at this time HISTORY:  
History of Present Injury/Illness (Reason for Referral): Isaiah Mckeon is a 80 y.o. female with a past medical history of previous CVA, GERD, temporal arteritis who presents to the ER with report of confusion and left facial droop starting around 2 PM today per report from SNF. Patient is still confused but does deny any weakness, fevers, chills. Is unable to provide further history Past Medical History/Comorbidities: Ms. Didier Quezada  has a past medical history of Arthritis, Cerebral microvascular disease (2019), CVA (cerebral vascular accident) ([address] (10/6/2020), Dependent edema, GERD (gastroesophageal reflux disease), Headache, Inguinal hernia recurrent bilateral, Stasis dermatitis of both legs, Temporal arteritis ([address] Thyroid disease, and TMJ click (73/9408). She also has no past medical history of CAD (coronary artery disease) or Cancer ([address] Ms. Dideir Quezada  has a past surgical history that includes hx gyn; hx heent; hx orthopaedic; hx endoscopy; hx colonoscopy; and hx gi. Social History/Living Environment:  
Home Environment: Independent living(apartment at [facility]) Care Facility Name: [facility] One/Two Story Residence: One story Living Alone: Yes Support Systems: Child(luma) Patient Expects to be Discharged to[de-identified] Unknown Current DME Used/Available at Home: linn Hebert Prior Level of Function/Work/Activity: 
Independent without a device. Number of Personal Factors/Comorbidities that affect the Plan of Care: 1-2: MODERATE COMPLEXITY EXAMINATION:  
Most Recent Physical Functioning:  
Gross Assessment: 
AROM: Generally decreased, functional 
Strength: Generally decreased, functional 
Coordination: Generally decreased, functional 
         
  
Posture: 
Posture (WDL): Exceptions to [facility] Posture Assessment: Forward head, Kyphosis, Rounded shoulders Balance: 
Sitting: Impaired Sitting - Static: Good (unsupported) Sitting - Dynamic: Fair (occasional) Standing: With support Bed Mobility: 
Supine to Sit: Stand-by assistance; Additional time Scooting: Stand-by assistance Wheelchair Mobility: 
  
Transfers: 
Sit to Stand: Stand-by assistance Stand to Sit: Stand-by assistance Gait: 
  
Speed/Brenda: Pace decreased (<100 feet/min) Step Length: Left shortened;Right shortened Distance (ft): 20 Feet (ft) Assistive Device: Walker, rolling Ambulation - Level of Assistance: Stand-by assistance Body Structures Involved: Muscles Body Functions Affected: Movement Related Activities and Participation Affected: Mobility Number of elements that affect the Plan of Care: 3: MODERATE COMPLEXITY CLINICAL PRESENTATION:  
Presentation: Stable and uncomplicated: LOW COMPLEXITY CLINICAL DECISION MAKIN [facility] [address] [id number] AM-PAC 6 Clicks Basic Mobility Inpatient Short Form How much difficulty does the patient currently have. .. Unable A Lot A Little None 1. Turning over in bed (including adjusting bedclothes, sheets and blankets)? [] 1   [] 2   [x] 3   [] 4  
2. Sitting down on and standing up from a chair with arms ( e.g., wheelchair, bedside commode, etc.)   [] 1   [] 2   [x] 3   [] 4  
3. Moving from lying on back to sitting on the side of the bed? [] 1   [] 2   [x] 3   [] 4 How much help from another person does the patient currently need. .. Total A Lot A Little None 4. Moving to and from a bed to a chair (including a wheelchair)? [] 1   [] 2   [x] 3   [] 4  
5. Need to walk in hospital room? [] 1   [] 2   [x] 3   [] 4  
6. Climbing 3-5 steps with a railing? [] 1   [] 2   [x] 3   [] 4  
© , Trustees of [address] [place] [address] [address] [id number], under license to [employer] All rights reserved Score:  Initial: 18 Most Recent: X (Date: -- ) Interpretation of Tool:  Represents activities that are increasingly more difficult (i.e. Bed mobility, Transfers, Gait). Medical Necessity:    
Patient is expected to demonstrate progress in  
strength, balance, and coordination 
 to  
increase independence with mobility. . 
Reason for Services/Other Comments: 
Patient continues to require skilled intervention due to Decreased strength and balance affecting mobility and independence.  
.  
Use of outcome tool(s) and clinical judgement create a POC that gives a: Clear prediction of patient's progress: LOW COMPLEXITY  
  
 
 
 
TREATMENT:  
 (In addition to Assessment/Re-Assessment sessions the following treatments were rendered) Pre-treatment Symptoms/Complaints:  Patient agreeable. Pain: Initial:  
Pain Intensity 1: 0  Post Session:  0 Therapeutic Activity: (    10 minutes): Therapeutic activities including bed mobility, donning binder, and ambulation in the room with walker to improve mobility, strength, balance, and coordination. Required minimal verbal cues   to promote static and dynamic balance in sitting and standing. Braces/Orthotics/Lines/Etc:  
none Treatment/Session Assessment:   
Response to Treatment:  Patient pleasant and participated well. Did prefer using a walker. Interdisciplinary Collaboration:  
Physical Therapist 
Occupational Therapist 
Registered Nurse After treatment position/precautions:  
Bed/Chair-wheels locked Call light within reach RN notified Sitting edge of bed Compliance with Program/Exercises: Will assess as treatment progresses Recommendations/Intent for next treatment session: \"Next visit will focus on advancements to more challenging activities and reduction in assistance provided\". Total Treatment Duration: PT Patient Time In/Time Out Time In: 1520 Time Out: 1545 Demetria Dent PT

## 2020-10-07 NOTE — CONSULTS
[facility] Neurology [facility] 
[address] [place], [address] Chief Complaint Patient presents with  Altered mental status Betina Maurice is a 80 y.o. female who presents on referral from the [employer] hospitalist service for evaluation of left facial droop some dysarthria and difficulty with balance that occurred yesterday the patient was seen by tele-neurology. The patient has a past history of presumed giant cell arteritis which is been treated empirically with corticosteroids No prior history of active cerebrovascular or coronary artery disease. Past Medical History:  
Diagnosis Date  Arthritis  Cerebral microvascular disease 2019 CT, MRI, Carotid U/S  
 CVA (cerebral vascular accident) ([facility] 10/6/2020  Dependent edema  GERD (gastroesophageal reflux disease)  Headache   
 ocular migraines  Inguinal hernia recurrent bilateral   
 Stasis dermatitis of both legs  Temporal arteritis ([address] right temple- empiric tx with steroids  Thyroid disease   
 hypothyroid  TMJ click 67/0612  
 bilateral  
 
 
Past Surgical History:  
Procedure Laterality Date  HX COLONOSCOPY    
 HX ENDOSCOPY    
 HH, gastritis,GERD  
 HX GI Antibiotics cause diarrhea  HX GYN Csection  HX HEENT Bilateral Cataract  HX ORTHOPAEDIC    
 wrist surg for fracture Family History Problem Relation Age of Onset  Stroke Mother Social History Socioeconomic History  Marital status:  Spouse name: Not on file  Number of children: Not on file  Years of education: Not on file  Highest education level: Not on file Tobacco Use  Smoking status: Never Smoker  Smokeless tobacco: Never Used Substance and Sexual Activity  Alcohol use: No  
 Drug use: No  
 
 
 
 
Current Facility-Administered Medications:  
  famotidine (PEPCID) tablet 20 mg, 20 mg, Oral, DAILY, Anastacia Ma MD 
   [START ON 10/8/2020] levothyroxine (SYNTHROID) tablet 50 mcg, 50 mcg, Oral, Once per day on Tue Thu, Hao Victor MD 
  levothyroxine (SYNTHROID) tablet 75 mcg, 75 mcg, Oral, Once per day on Sun Mon Wed Fri Sat, Jac White MD, 75 mcg at 10/07/20 5236   predniSONE (DELTASONE) tablet 5 mg, 5 mg, Oral, DAILY, Melida Victor MD 
  sodium chloride (NS) flush 5-40 mL, 5-40 mL, IntraVENous, Q8H, Hao Victor MD, 10 mL at 10/07/20 0600 
  sodium chloride (NS) flush 5-40 mL, 5-40 mL, IntraVENous, PRN, Jac White MD 
  0.9% sodium chloride infusion, 75 mL/hr, IntraVENous, CONTINUOUS, Chacorat MEYER MD, Last Rate: 75 mL/hr at 10/06/20 2101, 75 mL/hr at 10/06/20 2101   aspirin tablet 325 mg, 325 mg, Oral, DAILY, Melida Victor MD 
  atorvastatin (LIPITOR) tablet 40 mg, 40 mg, Oral, QHS, Chacorta MEYER MD, 40 mg at 10/06/20 2100   enoxaparin (LOVENOX) injection 30 mg, 30 mg, SubCUTAneous, Q24H, Chacorta MEYER MD, 30 mg at 10/06/20 2108 Allergies Allergen Reactions  Amoxicillin Diarrhea  Azithromycin Diarrhea  Ceftriaxone Diarrhea  Cefuroxime Diarrhea  Celecoxib Diarrhea  Dexilant [Dexlansoprazole] Diarrhea  Keflex [Cephalexin] Diarrhea  Metronidazole Diarrhea  Pepcid [Famotidine] Diarrhea  Sulfamethoxazole-Trimethoprim Diarrhea Review of Systems Visit Vitals BP (!) 150/67 (BP 1 Location: Right arm, BP Patient Position: At rest) Pulse 85 Temp 97.5 °F (36.4 °C) Resp 14 Ht 4' 11\" (1.499 m) Wt 99 lb 6.8 oz (45.1 kg) SpO2 95% Breastfeeding No  
BMI 20.08 kg/m² Neurologic Exam 
 
 
Most recent MRI Results from [facility] [place] encounter on 02/19/19 MRI BRAIN WO CONT Narrative EXAM: MRI brain without contrast. 
ADDITIONAL CLINICAL INFORMATION: Concern for cerebral infarct. Weakness. COMPARISON: None. CONTRAST: None. FINDINGS: 
No evidence of acute infarct. There is diffuse cerebral atrophy with ex vacuole dilatation of the ventricles. There is diffuse scattered subcortical, bilateral 
centrum semiovale, and periventricular T2 and STIR hyperintensities consistent 
with chronic ischemic white matter change. No intracranial mass or midline 
shift. No evidence of intracranial hemorrhage. The paranasal sinuses, mastoid air cells, and middle ears are clear. The orbital 
contents are within normal limits. No significant osseous or scalp lesions are 
identified. Impression IMPRESSION: 
1. No evidence of acute intracranial abnormality. Chronic changes as above. Most recent MRA Results from [facility] Encounter encounter on 02/19/19 MRI BRAIN WO CONT Narrative EXAM: MRI brain without contrast. 
ADDITIONAL CLINICAL INFORMATION: Concern for cerebral infarct. Weakness. COMPARISON: None. CONTRAST: None. FINDINGS: 
No evidence of acute infarct. There is diffuse cerebral atrophy with ex vacuole 
dilatation of the ventricles. There is diffuse scattered subcortical, bilateral 
centrum semiovale, and periventricular T2 and STIR hyperintensities consistent 
with chronic ischemic white matter change. No intracranial mass or midline 
shift. No evidence of intracranial hemorrhage. The paranasal sinuses, mastoid air cells, and middle ears are clear. The orbital 
contents are within normal limits. No significant osseous or scalp lesions are 
identified. Impression IMPRESSION: 
1. No evidence of acute intracranial abnormality. Chronic changes as above. Most recent CTA Results from Hospital Encounter encounter on 10/06/20 CT CODE NEURO HEAD WO CONTRAST Narrative CT of the head without contrast. 
 
CLINICAL INDICATION: Slurred speech, code stroke PROCEDURE: Serial thin section axial images are obtained from the cranial vertex 
through the skull base without the administration of intravenous contrast.  
Radiation dose reduction techniques were used for this study. Our CT scanners use one or all of the following: Automated exposure control, adjusted of the mA 
and/or kV according to patient size, iterative reconstruction COMPARISON: Head CT dated 2/19/2019. FINDINGS: There is no acute intracranial hemorrhage, mass, or mass effect. No 
abnormal extra-axial fluid collections identified. There is no hydrocephalus. The basilar cisterns are widely patent. Bilateral cerebral atrophy is again 
noted with moderate subcortical, deep, and paravertebral white matter patchy 
hypoattenuation. This is not significant changed. No findings present to 
indicate a large, cortical-based territorial infarction. No skull fracture or 
aggressive osseous lesion noted. The mastoid air cells and included paranasal 
sinuses are clear. Impression IMPRESSION:  
1. No acute intracranial abnormality. 2. Stable moderate bilateral white matter hypoattenuation most in keeping with 
chronic microangiopathic disease. 3. Stable atrophy. Most recent Echo No results found for this visit on 10/06/20. Most recent lipid panels Lab Results Component Value Date/Time Cholesterol, total 194 10/07/2020 04:39 AM  
 HDL Cholesterol 74 (H) 10/07/2020 04:39 AM  
 LDL, calculated 96.8 10/07/2020 04:39 AM  
 VLDL, calculated 23.2 (H) 10/07/2020 04:39 AM  
 Triglyceride 116 10/07/2020 04:39 AM  
 CHOL/HDL Ratio 2.6 10/07/2020 04:39 AM  
 
 
 
A small event such as this occurs in a 80year-old, is not necessarily a pathological occurrence  and management it is appropriate should be adjusted accordingly specifically there is concern about this age group and the importance of their ongoing neuromuscular mobility that if it is in any way impaired by statins the impact on quality of life and the impact on their overall wellness may far exceed any potential benefit in stroke prevention.  
 
There is no objective data in this age group that essentially is available to guide therapy on a scientific basis accordingly the following is suggested Attention of the stroke coordinator is drawn to the above comments Start ASA 81 mg daily · Statin usage is normally recommended. Tolerability of statins in this age group may well be severely compromised and accordingly given this patient's lipid profile it is NOT RECOMMENDED · Neurochecks Q4H 
· MRI of brainis unlikely to change the management · Bedside swallow test  
· Labs: A1c, FLP, TSH, Cardiac enzymes, BMP, CBC · Telemetry and echocardiogram with bubble study · PT/OT/ST 
· DVT prophylaxis · BP management -no manipulation · Diabetes education if applicable · Depression Screening prior to discharge Vipul Nevarez MD

## 2020-10-07 NOTE — DISCHARGE INSTRUCTIONS
Stroke: After Your Visit     Your Care Instructions     Risk factors for stroke include being overweight, smoking, and sedentary lifestyle. This means that the blood flow to a part of your brain was blocked for some time, which damages the nerve cells in that part of the brain. The part of your body controlled by that part of your brain may not function properly now. The brain is an amazing organ that can heal itself to some degree. The stroke you had damaged part of your brain, but other parts of your brain may take over in some way for the damaged areas. You have already started this process. Going home may be hard for you and your family. The more you can try to do for yourself, the better. Remember to take each day one at a time. Follow-up care is a key part of your treatment and safety. Be sure to make and go to all appointments, and call your doctor if you are having problems. Its also a good idea to know your test results and keep a list of the medicines you take. How can you care for yourself at home? Enter a stroke rehabilitation (rehab) program, if your doctor recommends it. Physical, speech, and occupational therapies can help you manage bathing, dressing, eating, and other basics of daily living. Eat a heart-healthy diet that is low in cholesterol, saturated fat, and salt. Eat lots of fresh fruits and vegetables and foods high in fiber. Increase your activities slowly. Take short rest breaks when you get tired. Gradually increase the amount you walk. Start out by walking a little more than you did the day before. Do not drive until your doctor says it is okay. It is normal to feel sad or depressed after a stroke. If the blues last, talk to your doctor. If you are having problems with urine leakage, go to the bathroom at regular times, including when you first wake up and at bedtime. Also, limit fluids after dinner.   If you are constipated, drink plenty of fluids, enough so that your urine is light yellow or clear like water. If you have kidney, heart, or liver disease and have to limit fluids, talk with your doctor before you increase the amount of fluids you drink. Set up a regular time for using the toilet. If you continue to have constipation, your doctor may suggest using a bulking agent, such as Metamucil, or a stool softener, laxative, or enema. Medicines  Take your medicines exactly as prescribed. Call your doctor if you think you are having a problem with your medicine. You may be taking several medicines. ACE (angiotensin-converting enzyme) inhibitors, angiotensin II receptor blockers (ARBs), beta-blockers, diuretics (water pills), and calcium channel blockers control your blood pressure. Statins help lower cholesterol. Your doctor may also prescribe medicines for depression, pain, sleep problems, anxiety, or agitation. If your doctor has given you medicine that prevents blood clots, such as warfarin (Coumadin), aspirin combined with extended-release dipyridamole (Aggrenox), clopidogrel (Plavix), or aspirin to prevent another stroke, you should:  Tell your dentist, pharmacist, and other health professionals that you take these medicines. Watch for unusual bruising or bleeding, such as blood in your urine, red or black stools, or bleeding from your nose or gums. Get regular blood tests to check your clotting time if you are taking Coumadin. Wear medical alert jewelry that says you take blood thinners. You can buy this at most drugstores. Do not take any over-the-counter medicines or herbal products without talking to your doctor first.  If you take birth control pills or hormone replacement therapy, talk to your doctor about whether they are right for you. For family members and caregivers  Make the home safe. Set up a room so that your loved one does not have to climb stairs. Be sure the bathroom is on the same floor.  Move throw rugs and furniture that could cause falls, and make sure that the lighting is good. Put grab bars and seats in tubs and showers. Find out what your loved one can do and what he or she needs help with. Try not to do things for your loved one that your loved one can do on his or her own. Help him or her learn and practice new skills. Visit and talk with your loved one often. Try doing activities together that you both enjoy, such as playing cards or board games. Keep in touch with your loved one's friends as much as you can, and encourage them to visit. Take care of yourself. Do not try to do everything yourself. Ask other family members to help. Eat well, get enough rest, and take time to do things that you enjoy. Keep up with your own doctor visits, and make sure to take your medicines regularly. Get out of the house as much as you can. Join a local support group. Find out if you qualify for home health care visits to help with rehab or for adult day care. When should you call for help? Call 911 anytime you think you may need emergency care. For example, call if:  You have signs of another stroke. These may include:  Sudden numbness, paralysis, or weakness in your face, arm, or leg, especially on only one side of your body. New problems with walking or balance. Sudden vision changes. Drooling or slurred speech. New problems speaking or understanding simple statements, or you feel confused. A sudden, severe headache that is different from past headaches. Call 911 even if these symptoms go away in a few minutes. You cough up blood. You vomit blood or what looks like coffee grounds. You pass maroon or very bloody stools. Call your doctor now or seek immediate medical care if:  You have new bruises or blood spots under your skin. You have a nosebleed. Your gums bleed when you brush your teeth. You have blood in your urine. Your stools are black and tarlike or have streaks of blood.   You have vaginal bleeding when you are not having your period, or heavy period bleeding. You have new symptoms that may be related to your stroke, such as falls or trouble swallowing. Watch closely for changes in your health, and be sure to contact your doctor if you have any problems. Where can you learn more? Go to [employer]    Enter C294  in the search box to learn more about \"Stroke: After Your Visit\". © 3774-6194 [employer]wise, Incorporated. Care instructions adapted under license by [facility] (which disclaims liability or warranty for this information). This care instruction is for use with your licensed healthcare professional. If you have questions about a medical condition or this instruction, always ask your healthcare professional. Raj Salvador any warranty or liability for your use of this information.

## 2020-10-07 NOTE — PROGRESS NOTES
Care Management Interventions Palliative Care Criteria Met (RRAT>21 & CHF Dx)?: No 
Transition of Care Consult (CM Consult): Discharge Planning Discharge Durable Medical Equipment: No 
Physical Therapy Consult: Yes Occupational Therapy Consult: Yes Speech Therapy Consult: Yes Current Support Network: Lives Alone The Plan for Transition of Care is Related to the Following Treatment Goals : debility, S/P CVA The Patient and/or Patient Representative was Provided with a Choice of Provider and Agrees with the Discharge Plan?: Yes Freedom of Choice List was Provided with Basic Dialogue that Supports the Patient's Individualized Plan of Care/Goals, Treatment Preferences and Shares the Quality Data Associated with the Providers?: Yes Discharge Location Discharge Placement: Unable to determine at this time Met with patient for d/c planning. Patient sitting up at the side of the bed. She is alert and oriented x 3 but has some difficulty finding her words. Discussed with patient about d/c options. Patient states she does not feel she is able to go home and asking about rehab. Patient initially was observation and Dr. Maddy Suarez came into the room as CM was in the room speaking with patient about discharge and spoke with patient about d/c and MRI results did show a stroke. Patient states she needs to discuss all of this with her son and Dr. Maddy Suarez explained to patient that she has discussed with PT and son the recommendations. Patient states she needs another night and may even need rehab although she states \"I have been to rehab last year and really didn't like it but will need to think about it tonight and make arrangements. Patient has been changed to inpatient admission and will need 3 night stay if decides to go to rehab.  Provided patient with option of home with [address] and also discussed hiring someone to stay with her and help out such as [employer] or [employer]. She declined any further information states she will discuss with her son tonight and let us know what she decides tomorrow. She did request I speak with her son Kerry Green [phone]. CM called the son and spoke with him and discussed d/c options. CM asked son if he would be able to stay with his mother or can she stay with him. Provided him with information over the phone and explained could provide him with information of different agencies he could hire to assist. Patient is from [facility] and did discuss looking into [facility] if needed at a later time depending on the availability. Son states he will speak with his mother and discuss discharge. He states he thinks she would do better at home and just hire someone to stay with her. CM made sure phone was with patient so he could call into the room to speak with his mother. D/C options are Home with home health, hiring help at home,or SNF. CM following. Addendum: Received call from patient son Dat Mireles and he states spoke with his mother and they would like a referral to [facility] and see if they can be approved for rehab.  HILDA

## 2020-10-07 NOTE — ROUTINE PROCESS
TRANSFER - OUT REPORT: 
 
Verbal report given to [facility] on Orlando Moore  being transferred to [facility] rm # [id number] for routine progression of care Report consisted of patients Situation, Background, Assessment and  
Recommendations(SBAR). Information from the following report(s) SBAR was reviewed with the receiving nurse. Lines:  
Peripheral IV 10/06/20 Right Antecubital (Active) Site Assessment Clean, dry, & intact 10/06/20 1749 Phlebitis Assessment 0 10/06/20 1749 Infiltration Assessment 0 10/06/20 1749 Dressing Status Clean, dry, & intact 10/06/20 1749 Dressing Type Gauze;Tape;Transparent 10/06/20 1749 Hub Color/Line Status Pink;Flushed 10/06/20 1749 Action Taken Blood drawn 10/06/20 1749 Opportunity for questions and clarification was provided. Patient transported with: 
 Registered Nurse

## 2020-10-07 NOTE — PROGRESS NOTES
PT note: Attempted PT evaluation 4 times today between 2 therapists-with nursing for procedure, with telemedicine, undergoing echo, and out of room for MRI/ultrasound. Will continue to attempt as schedule allows.  
 
Chris Cardenas, PT

## 2020-10-07 NOTE — PROGRESS NOTES
Problem: Self Care Deficits Care Plan (Adult) Goal: *Acute Goals and Plan of Care (Insert Text) Outcome: Progressing Towards Goal 
Note: 1. Patient will perform grooming with supervision. 2. Patient will perform Upper body dressing with supervision 3. Patient will perform lower body dressing with supervision 4. Patient will perform upper and lower body bathing with supervision. 5. Patient will perform toilet transfers with SBA 6. Patient will perform shower transfer with SBA 7. Patient will participate in 30 + minutes of ADL/ therapeutic exercise/therapeutic activity with min rest breaks to increase activity tolerance for self care and compensate for decreased visual skills. 8. Patient will perform ADL functional mobility in room with supervision Goals to be achieved in 7 days. OCCUPATIONAL THERAPY: Initial Assessment and AM 10/7/2020 OBSERVATION: OT Visit Days: 1 [place] / [place] / Product Type: Medicare /  
  
NAME/AGE/GENDER: Dianelys Rosas is a 80 y.o. female PRIMARY DIAGNOSIS:  CVA (cerebral vascular accident) ([facility] [address] [I63.9] CVA (cerebral vascular accident) ([facility] [address] CVA (cerebral vascular accident) ([facility] [address] ICD-10: Treatment Diagnosis:  
 · Generalized Muscle Weakness (M62.81) · Other lack of cordination (R27.8) · Difficulty in walking, Not elsewhere classified (R26.2) · Other abnormalities of gait and mobility (R26.89) Precautions/Allergies: 
   Amoxicillin; Azithromycin; Ceftriaxone; Cefuroxime; Celecoxib; Ata Juan; Keflex [cephalexin]; Metronidazole; Pepcid [famotidine]; and Sulfamethoxazole-trimethoprim ASSESSMENT:  
 
Ms. Charlotte López presents supine in bed. She demonstrated some word finding deficits with stating her  and difficulty following directions at times. She reported her vision has changed from baseline. IT is not as acute as before and appears a little fuzzy.  Her Right eye is more clear than her left eye. She was able to read the clock and the board on the wall with additional time. She was SBA supine to sit and CGA with RW to walk around in the room. She reported her legs are not at baseline. She did not lose balance during mobility. Her B UE arom WFL and strength WFL and symmetrical. She was able to do snaps and tie a lace as well as don her socks while seated on EOb. She lives by herself in an [address] apartment at [facility]. She was independent with all ADLs and only used a RW outside of her apartment. She would benefit from skilled OT as she is not at her baseline. Will follow. This section established at most recent assessment PROBLEM LIST (Impairments causing functional limitations): 1. Decreased ADL/Functional Activities 2. Decreased Transfer Abilities 3. Decreased Ambulation Ability/Technique 4. Decreased Activity Tolerance 5. Visual skills INTERVENTIONS PLANNED: (Benefits and precautions of occupational therapy have been discussed with the patient.) 1. Activities of daily living training 2. Adaptive equipment training 3. Balance training 4. Clothing management 5. Donning&doffing training 6. Neuromuscular re-eduation 7. Therapeutic activity 8. Therapeutic exercise 9. Home safety TREATMENT PLAN: Frequency/Duration: Follow patient 2 times to address above goals. Rehabilitation Potential For Stated Goals: Good REHAB RECOMMENDATIONS (at time of discharge pending progress):   
Placement: It is my opinion, based on this patient's performance to date, that Ms. Vidhi Brown may benefit from participating in 1-2 additional therapy sessions in order to continue to assess for rehab potential and then make recommendation for disposition at discharge. Equipment:  
? None at this time OCCUPATIONAL PROFILE AND HISTORY:  
History of Present Injury/Illness (Reason for Referral): 
See H and P Past Medical History/Comorbidities: Ms. Mckenna Chan  has a past medical history of Arthritis, Cerebral microvascular disease (2019), CVA (cerebral vascular accident) ([facility] [address] (10/6/2020), Dependent edema, GERD (gastroesophageal reflux disease), Headache, Inguinal hernia recurrent bilateral, Stasis dermatitis of both legs, Temporal arteritis ([facility] [address] Thyroid disease, and TMJ click (38/5613). She also has no past medical history of CAD (coronary artery disease) or Cancer ([facility] [address] Ms. Mckenna Chan  has a past surgical history that includes hx gyn; hx heent; hx orthopaedic; hx endoscopy; hx colonoscopy; and hx gi. Social History/Living Environment:  
Home Environment: Assisted living Care [facility] Residence: One story Living Alone: Yes Support Systems: Child(luma), Family member(s) Patient Expects to be Discharged to[de-identified] Assisted living Current DME Used/Available at Home: None Prior Level of Function/Work/Activity: 
Independent adls and mobility in her apartment. Number of Personal Factors/Comorbidities that affect the Plan of Care: Brief history (0):  LOW COMPLEXITY ASSESSMENT OF OCCUPATIONAL PERFORMANCE[de-identified]  
Activities of Daily Living:  
Basic ADLs (From Assessment) Complex ADLs (From Assessment) Oral Facial Hygiene/Grooming: Supervision Bathing: Supervision, Contact guard assistance Upper Body Dressing: Supervision Lower Body Dressing: Supervision Toileting: Supervision, Contact guard assistance Grooming/Bathing/Dressing Activities of Daily Living Cognitive Retraining Safety/Judgement: Awareness of environment; Fall prevention Bed/Mat Mobility Supine to Sit: Stand-by assistance Sit to Supine: Stand-by assistance Sit to Stand: Contact guard assistance Stand to Sit: Contact guard assistance Scooting: Stand-by assistance Most Recent Physical Functioning:  
Gross Assessment: 
  
         
  
Posture: 
  
Balance: 
Sitting: Intact Standing: With support Bed Mobility: Supine to Sit: Stand-by assistance Sit to Supine: Stand-by assistance Scooting: Stand-by assistance Wheelchair Mobility: 
  
Transfers: 
Sit to Stand: Contact guard assistance Stand to Sit: Contact guard assistance Patient Vitals for the past 6 hrs: 
 BP BP Patient Position SpO2 Pulse 10/07/20 0751 (!) 150/67 At rest 95 % 85 Mental Status Neurologic State: Alert, Appropriate for age Orientation Level: Appropriate for age Cognition: Appropriate decision making, Appropriate for age attention/concentration, Appropriate safety awareness, Follows commands Perception: Appears intact Perseveration: No perseveration noted Safety/Judgement: Awareness of environment, Fall prevention Physical Skills Involved: 1. Activity Tolerance 2. vision Cognitive Skills Affected (resulting in the inability to perform in a timely and safe manner): 1. Expression Psychosocial Skills Affected: 1. Habits/Routines 2. Self-Awareness Number of elements that affect the Plan of Care: 3-5:  MODERATE COMPLEXITY CLINICAL DECISION MAKIN[address] [place] [id number] AM-PAC 6 Clicks Daily Activity Inpatient Short Form How much help from another person does the patient currently need. .. Total A Lot A Little None 1. Putting on and taking off regular lower body clothing? [] 1   [] 2   [x] 3   [] 4  
2. Bathing (including washing, rinsing, drying)? [] 1   [] 2   [x] 3   [] 4  
3. Toileting, which includes using toilet, bedpan or urinal?   [] 1   [] 2   [x] 3   [] 4  
4. Putting on and taking off regular upper body clothing? [] 1   [] 2   [] 3   [x] 4  
5. Taking care of personal grooming such as brushing teeth? [] 1   [] 2   [] 3   [x] 4  
6. Eating meals? [] 1   [] 2   [] 3   [x] 4  
© , Trustees of [address] [place] [address] [address] [address], under license to [employer]. All rights reserved Score:  Initial: 21 Most Recent: X (Date: -- ) Interpretation of Tool:  Represents activities that are increasingly more difficult (i.e. Bed mobility, Transfers, Gait). Medical Necessity:    
· Patient is expected to demonstrate progress in · Self care skills and functional mobility · Reason for Services/Other Comments: 
· Patient continues to require skilled intervention due to · Above listed deficits Use of outcome tool(s) and clinical judgement create a POC that gives a: LOW COMPLEXITY  
 
 
 
TREATMENT:  
(In addition to Assessment/Re-Assessment sessions the following treatments were rendered) Pre-treatment Symptoms/Complaints:   
Pain: Initial:  
Pain Intensity 1: 0  Post Session:  0/10 Assessment/Reassessment only, no treatment provided today Braces/Orthotics/Lines/Etc:  
· Treatment/Session Assessment:   
· Response to Treatment: tolerated well · Interdisciplinary Collaboration:  
o Physical Therapist 
o Occupational Therapist 
o Registered Nurse · After treatment position/precautions:  
o Supine in bed 
o Bed alarm/tab alert on 
o Bed/Chair-wheels locked 
o Bed in low position 
o Call light within reach 
o RN notified 
o Side rails x 3  
· Compliance with Program/Exercises: Compliant all of the time. · Recommendations/Intent for next treatment session: \"Next visit will focus on advancements to more challenging activities and reduction in assistance provided\". Total Treatment Duration: OT Patient Time In/Time Out Time In: 0800 Time Out: 0820 Erika Kamara OT

## 2020-10-08 PROBLEM — R41.0 ACUTE DELIRIUM: Status: ACTIVE | Noted: 2020-10-08

## 2020-10-08 LAB
MM INDURATION POC: NORMAL (ref 0–5)
PPD POC: NORMAL

## 2020-10-08 PROCEDURE — 92523 SPEECH SOUND LANG COMPREHEN: CPT

## 2020-10-08 PROCEDURE — 87635 SARS-COV-2 COVID-19 AMP PRB: CPT

## 2020-10-08 PROCEDURE — 65270000029 HC RM PRIVATE

## 2020-10-08 PROCEDURE — 74011250636 HC RX REV CODE- 250/636: Performed by: FAMILY MEDICINE

## 2020-10-08 PROCEDURE — 97530 THERAPEUTIC ACTIVITIES: CPT

## 2020-10-08 PROCEDURE — 74011636637 HC RX REV CODE- 636/637: Performed by: FAMILY MEDICINE

## 2020-10-08 PROCEDURE — 74011250637 HC RX REV CODE- 250/637: Performed by: FAMILY MEDICINE

## 2020-10-08 PROCEDURE — 77030018846 HC SOL IRR STRL H20 ICUM -A

## 2020-10-08 RX ADMIN — ASPIRIN 81 MG: 81 TABLET, CHEWABLE ORAL at 09:50

## 2020-10-08 RX ADMIN — PREDNISONE 5 MG: 5 TABLET ORAL at 09:50

## 2020-10-08 RX ADMIN — ENOXAPARIN SODIUM 30 MG: 30 INJECTION SUBCUTANEOUS at 21:20

## 2020-10-08 RX ADMIN — LEVOTHYROXINE SODIUM 50 MCG: 0.05 TABLET ORAL at 06:36

## 2020-10-08 RX ADMIN — FAMOTIDINE 20 MG: 20 TABLET ORAL at 21:20

## 2020-10-08 NOTE — PROGRESS NOTES
Patient has bed offer from [facility]. Will need to see if they can accept patient on the weekend and also will need COVID test. CM Following.

## 2020-10-08 NOTE — PROGRESS NOTES
Out of bed to restroom. Walker in use. Pt voiding without problem. States that she feels pretty good.

## 2020-10-08 NOTE — CONSULTS
Physiatry Consult Received EMR reviewed. Will defer consult as decision to dc to [facility] where pt resides in [facility], and be admitted to the skilled unit there prior to returning home. She is doing very well and could likely go home but would recommend supervision. Pt has some confusion but not entirely unexpected in a 79yo woman with recent CVA. Cont PT/OT/St in skilled facility IRC not indicated; too functional and no need for aggressive rehab setting. Guerita Beckford MD, Medical Director [address] [facility]

## 2020-10-08 NOTE — PROGRESS NOTES
Care Management Interventions PCP Verified by CM: Yes 
Palliative Care Criteria Met (RRAT>21 & CHF Dx)?: No 
Mode of Transport at Discharge: Self Transition of Care Consult (CM Consult): Discharge Planning, SNF Discharge Durable Medical Equipment: No 
Physical Therapy Consult: Yes Occupational Therapy Consult: Yes Speech Therapy Consult: Yes Current Support Network: Lives Alone Confirm Follow Up Transport: Family The Plan for Transition of Care is Related to the Following Treatment Goals : debility, S/P CVA The Patient and/or Patient Representative was Provided with a Choice of Provider and Agrees with the Discharge Plan?: Yes Freedom of Choice List was Provided with Basic Dialogue that Supports the Patient's Individualized Plan of Care/Goals, Treatment Preferences and Shares the Quality Data Associated with the Providers?: Yes Discharge Location Discharge Placement: Skilled nursing facility Spoke with patient and son. They have requested rehab at [facility]. Referral sent and approval obtained for admission on Sat 10-10. Son will provide car transport around lunchtime. I called [facility] and confirmed that they can accept a weekend admission as long as we have a neg COVID within 72 hrs. COVID ordered today 10-8.

## 2020-10-08 NOTE — PROGRESS NOTES
Pt is resting quietly in her bed,  Alert and oriented x's 3. No c/o pain or discomfort. No deficits noted from the CVA, at this time. Bed is in low locked position and call light within reach. No noted distress,  Will continue to monitor.

## 2020-10-08 NOTE — PROGRESS NOTES
Progress Note Patient: Rafael Alfaro MRN: [id number]  SSN: [id number] YOB: 1920  Age: 80 y.o. Sex: female Admit Date: 10/6/2020 LOS: 1 day Subjective: F/U CVA 79 yo hx of temporal arteritis currently on prednisone and hypothyroidism who presented to the ED cc left sided facial droop that started around 2 pm on day of admission. Not a TPA candidate. NIH scale 1 on day of admission. Admitted for CVA work up. LDL 96.8 but neurology did not recommend statin due to age. A1C 5.6. MRI brain showed focal subacute ischemic changes involving the right superior cerebellar artery distribution and ECHO showed no shunting or wall motion abnormality, EF 60%, RV pressure 50-55mmHg, moderate mitral valve regurg. Carotid US with less than 50% stenosis. Intervention very limited in 80year old since potential complications from surgical procedures high. Medial management alone. I have discussed the case with herself and her son. Will discharge with ASA alone once we have a facility. Less red meats and more vegetable diet. Was to be discharged back home with home health on 10/7 but patient did not feels safe to leave on that day and wanted to discuss further with her son with potential dc home on 10/8. On 10/8, patient now confused and unable to answer questions appropriately  
  
Speech therapy has cleared for regular diet. Starts to be argumentive when I walk in room but then quickly becomes pleasant again. Tries to call her son but cannot dial correctly, once I try to call the son for her, she gives me an incorrect number. No pain. Current Facility-Administered Medications Medication Dose Route Frequency  tuberculin injection 5 Units  5 Units IntraDERMal ONCE  
 famotidine (PEPCID) tablet 20 mg  20 mg Oral QHS  aspirin chewable tablet 81 mg  81 mg Oral DAILY  levothyroxine (SYNTHROID) tablet 50 mcg  50 mcg Oral Once per day on Tue Thu  
  levothyroxine (SYNTHROID) tablet 75 mcg  75 mcg Oral Once per day on Sun Mon Wed Fri Sat  predniSONE (DELTASONE) tablet 5 mg  5 mg Oral DAILY  sodium chloride (NS) flush 5-40 mL  5-40 mL IntraVENous Q8H  
 sodium chloride (NS) flush 5-40 mL  5-40 mL IntraVENous PRN  
 enoxaparin (LOVENOX) injection 30 mg  30 mg SubCUTAneous Q24H Objective:  
 
Vitals:  
 10/07/20 2015 10/08/20 0012 10/08/20 0415 10/08/20 0730 BP: 135/66 138/63 137/74 (!) 150/71 Pulse: 76 60 66 73 Resp: 16 16 16 16 Temp: 98.6 °F (37 °C) 98.2 °F (36.8 °C) 97.4 °F (36.3 °C) 98.2 °F (36.8 °C) SpO2: 97% 94% 95% 98% Weight:      
Height:      
  
  
Intake and Output: 
Current Shift: No intake/output data recorded. Last three shifts: 10/06 1901 - 10/08 0700 In: 480 [P.O.:480] Out: - Physical Exam:  
General:  Alert, cooperative, no distress Eyes:  Conjunctivae/corneas clear. Ears:  Normal TMs and external ear canals both ears. Nose: Nares normal. Septum midline. Mouth/Throat: Lips, mucosa, and tongue normal.   
Neck:  no JVD. Back:   deferred Lungs:   Clear to auscultation bilaterally. Heart:  Blowing systolic murmur best heard at right 2nd intercostal space Abdomen:   Soft, non-tender. Bowel sounds normal. Has bandage around her lower abdomen to help with a known hernia Extremities: Extremities normal, atraumatic, no cyanosis or edema. Pulses: 2+ and symmetric all extremities. Skin: Skin color, texture, turgor normal. No rashes or lesions Lymph nodes: Cervical, supraclavicular, and axillary nodes normal.  
Neurologic: Cannot tell me the year, who the president is, or where she is located. Lab/Data Review: No results found for this or any previous visit (from the past 24 hour(s)). Assessment/ Plan:  
 
Principal Problem: 
  CVA (cerebral vascular accident) ([facility] [address] (10/6/2020) Active Problems: 
  Acquired hypothyroidism (3/18/2016) Gastroesophageal reflux disease without esophagitis (3/18/2016) Facial droop (10/6/2020) Acute delirium (10/8/2020) CVA- PT following. Patient and son would feel more comfortable at rehab facility. I also agree since more confused on exam. ASA alone. No statin. Medical management alone Acute delirium - Certainly with her age and recent CVA she will be more prone to this. Keep alert to day and night. Will order minimal labs to avoid agitation. Hypothyroidism - Levothyroxine Temporal arteritis - Chronic prednisone use. I have updated the son Plan to DC Saturday if [facility] will accept on weekend. PPD and COVID ordered. DVT prophylaxis - Lovenox Signed By: Bj Antoine,  October 8, 2020

## 2020-10-08 NOTE — PROGRESS NOTES
Problem: Neurolinguistics Impaired (Adult) Goal: *Acute Goals and Plan of Care (Insert Text) Description: LTG: Patient will increase receptive/expressive language skills demonstrated by the ability to communicate basic wants/needs across environments STG: Patient will name objects, pictures, people with 90% accuracy given min-mod cueing STG: Patient will name items described based on verbal description with 90% accuracy given minimal cues STG: Patient will perform sentence completion tasks with 90% accuracy given minimal cues. STG: Patient will complete convergent naming tasks with 90% accuracy given min-mod cues STG: Patient will complete basic problem solving/reasoning tasks with 80% accuracy given minimal cueing STG: Patient will recall functional safety information with use of visual aids with 90% accuracy given minimal cues. Outcome: Progressing Towards Goal 
 
 
SPEECH LANGUAGE PATHOLOGY: SPEECH-LANGUAGE/COGNITION: Initial Assessment NAME/AGE/GENDER: Carolyn Vasques is a 80 y.o. 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  
TREATMENT RECOMMENDATIONS:  
Recommend ongoing speech therapy during acute hospitalization and at next level of care as patient is currently functioning below baseline in regards to cognitive linguistic function. STRATEGIES:  
Allow patient additional response time for word retrieval 
Reduce distractions EDUCATION: 
Recommendations discussed with Patient Continuation of Skilled Services/Medical Necessity: 
Patient is expected to demonstrate progress in expressive communication, receptive ability, and cognitive ability to decrease assistance required communication, increase independence with activities of daily living, and increase communication with family/caregivers. Patient continues to require skilled intervention due to aphasia and cognitive linguistic deficts. RECOMMENDATIONS for CONTINUED SPEECH THERAPY: YES: Anticipate need for ongoing speech therapy during this hospitalization and at next level of care. ASSESSMENT Patient presents with mild-moderate expressive/receptive aphasia, reduced functional recall, and decreased problem solving all of which reduce patient's functional communication and safety/independence with ADLs. Patient able to completed automatic speech tasks and phrase completion; however, reduced accuracy with naming objects and frequent anomia noted in conversation. Follows 2 step commands and answers yes/no questions appropriately. Difficulty recalling use of call bell for assistance immediately post training and decreased problem solving noted when patient was asked to verbally provide solutions to problems in home environment. Exact prior level of function in regards to cognitive linguistic function unknown ? Baseline dementia. Recommend ongoing speech therapy during acute hospitalization and at next level of care. REHABILITATION POTENTIAL FOR STATED GOALS: Good PLAN   
FREQUENCY/DURATION: Continue to follow patient 2 times a week for duration of hospital stay to address above goals. - Recommendations for next treatment session: Next treatment will address cognitive linguistic treatment SUBJECTIVE Patient friendly and pleasant. Endorses increased difficulty with word finding. She is hard of hearing.   
 
History of Present Injury/Illness: Ms. Cheryl Dow  has a past medical history of Arthritis, Cerebral microvascular disease (2019), CVA (cerebral vascular accident) ([address] (10/6/2020), Dependent edema, GERD (gastroesophageal reflux disease), Headache, Inguinal hernia recurrent bilateral, Stasis dermatitis of both legs, Temporal arteritis ([address] Thyroid disease, and TMJ click (). She also has no past medical history of CAD (coronary artery disease) or Cancer ([facility] [address] . She also  has a past surgical history that includes hx gyn; hx heent; hx orthopaedic; hx endoscopy; hx colonoscopy; and hx gi. Problem List:  (Impairments causing functional limitations): Aphasia Cognitive linguistic deficits Previous Speech Therapy NONE REPORTED Orientation:  
Person Place Time 
 
Pain: Pain Scale 1: Numeric (0 - 10) Pain Intensity 1: 0 OBJECTIVE Patient was administered portions of the Communication/Cognitive Brief Evaluation and other informal language assessment measures with results as follows: 
 
Yes/no questions: / 
1 step directions: 33 
2 step directions: 33 Naming objects: 6 Phrase completion:  Responsive namin/6 Divergent naming: named 10 animal in 1 minute Convergent naming: 3/5 accurate Basic problem solvin/ Unable to recall use of call bell immediately post training. When asked function patient stated, \"its' how the nurse calls me. \" Tool Used: MODIFIED ARTI SCALE (mRS) Score No Symptoms  [] 0 No significant disability despite symptoms; able to carry out all usual duties and activities  [] 1 Slight disability; unable to carry out all previous activities but able to look after own affairs without assistance. [] 2 Moderate disability; requiring some help but able to walk without assistance  [x] 3 Moderately severe disability; unable to walk without assistance and unable to attend to own bodily needs without assistance  [] 4 Severe disability; bedridden, incontinent, and requiring constant nursing care and attention  [] 5 Score:  Initial: 3 Interpretation of Tool: The Modified [place] Scale is a 7-point scaled used to quantify level of disability as it relates to a patient's functional abilities. Current Medications: No current facility-administered medications on file prior to encounter. Current Outpatient Medications on File Prior to Encounter Medication Sig Dispense Refill  
 alum-mag hydroxide-simeth (MYLANTA) 200-200-20 mg/5 mL susp Take 30 mL by mouth every four (4) hours as needed. levothyroxine (SYNTHROID) 50 mcg tablet Take 1 tablet every Tuesday and Thursday. 24 Tab 5  
 levothyroxine (SYNTHROID) 75 mcg tablet [id number] tablet by mouth 5 days a week. On Tuesday and Thursday only takes 50mcg tablet. Indications: a condition with low thyroid hormone levels 60 Tab 5  
 predniSONE (DELTASONE) 5 mg tablet Take 1 Tab by mouth daily. 90 Tab 2  
 famotidine (PEPCID) 20 mg tablet Take 1 Tab by mouth daily. 90 Tab 3 INTERDISCIPLINARY COLLABORATION: Registered Nurse PRECAUTIONS/ALLERGIES: Amoxicillin; Azithromycin; Ceftriaxone; Cefuroxime; Celecoxib; [place] Height; Keflex [cephalexin]; Metronidazole; Pepcid [famotidine]; and Sulfamethoxazole-trimethoprim SAFETY: 
After treatment position/precautions: 
Up in chair Call light within reach Total Treatment Duration:  
 
Time In: 7049 Time Out: 0490 Huang Valencia MS, CCC-SLP

## 2020-10-08 NOTE — PROGRESS NOTES
Notified by monitor room that pt is in afib. Patient sitting in recliner. No distress noted. States that she feels well. MD notified through [employer].

## 2020-10-08 NOTE — PROGRESS NOTES
Problem: Mobility Impaired (Adult and Pediatric) Goal: *Acute Goals and Plan of Care (Insert Text) Description: LTG: 
(1.)Ms. Katiana Mosquera will move from supine to sit and sit to supine  in bed with INDEPENDENT within 5 treatment day(s). (2.)Ms. Katiana Mosquera will transfer from bed to chair and chair to bed with SUPERVISION using the least restrictive device within 5 treatment day(s). Met 10/8/20  
(3.)Ms. Katiana Mosquera will ambulate with SUPERVISION for 100 feet with the least restrictive device within 5 treatment day(s). Met 10/8/20 NEW GOAL: 
(4.)Ms. Katiana Mosquera will ambulate with SUPERVISION for 100 feet without an assistive device within 5 treatment days. ________________________________________________________________________________________________ Outcome: Progressing Towards Goal 
  
PHYSICAL THERAPY: Daily Note and AM 10/8/2020 INPATIENT: PT Visit Days : 2 [place] / [place] / Product Type: Medicare /   
  
NAME/AGE/GENDER: Juani Valentin is a 80 y.o. female PRIMARY DIAGNOSIS: CVA (cerebral vascular accident) ([facility] [address] [I63.9] CVA (cerebral vascular accident) ([facility] [address] [I63.9] CVA (cerebral vascular accident) ([facility] [address] CVA (cerebral vascular accident) ([facility] [address] ICD-10: Treatment Diagnosis:  
 · Generalized Muscle Weakness (M62.81) · Difficulty in walking, Not elsewhere classified (R26.2) Precaution/Allergies: 
Amoxicillin; Azithromycin; Ceftriaxone; Cefuroxime; Celecoxib; [place] Box; Keflex [cephalexin]; Metronidazole; Pepcid [famotidine]; and Sulfamethoxazole-trimethoprim ASSESSMENT:  
 
Ms. Katiana Mosquera presents with above diagnosis. Patient demonstrates weakness and impaired balance in sitting and standing. She is typically independent without a device and lives independently in an apartment at [facility]. Patient was able to bridge multiple times in bed to ramona a brace around her hips to manage her hernia.   She got to sitting edge of bed with SBA but took increased time and lost her balance posteriorly multiple times while coming to the edge of the bed. She felt the walker was helpful for mobility in the room. Patient does not feel comfortable returning to her apartment at her current level but stated she does not want skilled nursing. Patient does have some word finding issues and seemed to get confused when discussing different living environments/options at [facility]. Son not present but patient would benefit from some supervision/assistance at d/c. Will continue therapy. 10/8/20:  Patient participated well. Moving better than yesterday. Ambulated increased distance with the walker with supervision only. Ambulated in room without a walker with SBA. Performed LE exercises in standing with UE support. Feel patient needs to return home with assist/supervision instead of SNF but appears patient and son want SNF. This section established at most recent assessment PROBLEM LIST (Impairments causing functional limitations): 1. Decreased Strength 2. Decreased ADL/Functional Activities 3. Decreased Transfer Abilities 4. Decreased Ambulation Ability/Technique 5. Decreased Balance INTERVENTIONS PLANNED: (Benefits and precautions of physical therapy have been discussed with the patient.) 1. Balance Exercise 2. Bed Mobility 3. Family Education 4. Gait Training 5. Home Exercise Program (HEP) 6. Therapeutic Activites 7. Therapeutic Exercise/Strengthening TREATMENT PLAN: Frequency/Duration: daily for duration of hospital stay Rehabilitation Potential For Stated Goals: Good REHAB RECOMMENDATIONS (at time of discharge pending progress):   
Placement: It is my opinion, based on this patient's performance to date, that Ms. Myles Patel may benefit from participating in 1-2 additional therapy sessions in order to continue to assess for rehab potential and then make recommendation for disposition at discharge. Equipment: ? None at this time HISTORY:  
History of Present Injury/Illness (Reason for Referral): Gerard Art is a 80 y.o. female with a past medical history of previous CVA, GERD, temporal arteritis who presents to the ER with report of confusion and left facial droop starting around 2 PM today per report from SNF. Patient is still confused but does deny any weakness, fevers, chills. Is unable to provide further history Past Medical History/Comorbidities: Ms. Win Nickerson  has a past medical history of Arthritis, Cerebral microvascular disease (2019), CVA (cerebral vascular accident) ([facility] [address] (10/6/2020), Dependent edema, GERD (gastroesophageal reflux disease), Headache, Inguinal hernia recurrent bilateral, Stasis dermatitis of both legs, Temporal arteritis ([address] Thyroid disease, and TMJ click (45/2224). She also has no past medical history of CAD (coronary artery disease) or Cancer ([facility] [address] Ms. Win Nickerson  has a past surgical history that includes hx gyn; hx heent; hx orthopaedic; hx endoscopy; hx colonoscopy; and hx gi. Social History/Living Environment:  
Home Environment: Independent living(apartment at [facility]) Care Facility Name: [facility] One/Two Story Residence: One story Living Alone: Yes Support Systems: Child(luma) Patient Expects to be Discharged to[de-identified] Unknown Current DME Used/Available at Home: Kellen Khan, linn Prior Level of Function/Work/Activity: 
Independent without a device. Number of Personal Factors/Comorbidities that affect the Plan of Care: 1-2: MODERATE COMPLEXITY EXAMINATION:  
Most Recent Physical Functioning:  
Gross Assessment: 
AROM: Generally decreased, functional 
Strength: Generally decreased, functional 
Coordination: Generally decreased, functional 
         
  
Posture: 
Posture (WDL): Exceptions to [facility] Posture Assessment: Forward head, Kyphosis, Rounded shoulders Balance: 
Sitting: Intact Standing - Static: Good Standing - Dynamic : Fair Bed Mobility: Supine to Sit: Supervision Wheelchair Mobility: 
  
Transfers: 
Sit to Stand: Supervision Stand to Sit: Supervision Bed to Chair: Supervision Gait: 
  
Base of Support: Center of gravity altered Speed/Brenda: Pace decreased (<100 feet/min) Step Length: Left shortened;Right shortened Distance (ft): 175 Feet (ft)(x 1; 20' x 1) Assistive Device: Walker, rolling Ambulation - Level of Assistance: Supervision;Stand-by assistance Interventions: Safety awareness training;Verbal cues Body Structures Involved: 1. Muscles Body Functions Affected: 1. Movement Related Activities and Participation Affected: 1. Mobility Number of elements that affect the Plan of Care: 3: MODERATE COMPLEXITY CLINICAL PRESENTATION:  
Presentation: Stable and uncomplicated: LOW COMPLEXITY CLINICAL DECISION MAKING:  
[facility] 6 [facility]licks Basic Mobility Inpatient Short Form How much difficulty does the patient currently have. .. Unable A Lot A Little None 1. Turning over in bed (including adjusting bedclothes, sheets and blankets)? [] 1   [] 2   [x] 3   [] 4  
2. Sitting down on and standing up from a chair with arms ( e.g., wheelchair, bedside commode, etc.)   [] 1   [] 2   [x] 3   [] 4  
3. Moving from lying on back to sitting on the side of the bed? [] 1   [] 2   [x] 3   [] 4 How much help from another person does the patient currently need. .. Total A Lot A Little None 4. Moving to and from a bed to a chair (including a wheelchair)? [] 1   [] 2   [x] 3   [] 4  
5. Need to walk in hospital room? [] 1   [] 2   [x] 3   [] 4  
6. Climbing 3-5 steps with a railing? [] 1   [] 2   [x] 3   [] 4  
© 2007, Trustees of [facility] [employer], under license to [employer]. All rights reserved Score:  Initial: 18 Most Recent: X (Date: -- ) Interpretation of Tool:  Represents activities that are increasingly more difficult (i.e. Bed mobility, Transfers, Gait). Medical Necessity: · Patient is expected to demonstrate progress in  
· strength, balance, and coordination ·  to  
· increase independence with mobility. · . Reason for Services/Other Comments: 
· Patient continues to require skilled intervention due to · Decreased strength and balance affecting mobility and independence. · . Use of outcome tool(s) and clinical judgement create a POC that gives a: Clear prediction of patient's progress: LOW COMPLEXITY  
  
 
 
 
TREATMENT:  
(In addition to Assessment/Re-Assessment sessions the following treatments were rendered) Pre-treatment Symptoms/Complaints:  Patient agreeable. Pain: Initial:  
Pain Intensity 1: 0  Post Session:  0 Therapeutic Activity: (    25 minutes): Therapeutic activities including bed mobility, toilet transfers, ambulation with and without walker, standing LE exercises, and chair transfer to improve mobility, strength, balance, and coordination. Required minimal verbal cues Safety awareness training;Verbal cues to promote static and dynamic balance in standing and B LE coordination. Braces/Orthotics/Lines/Etc:  
· telemetry Treatment/Session Assessment:   
· Response to Treatment:  Patient pleasant and participated well. Moving better. · Interdisciplinary Collaboration:  
o Physical Therapist 
o Registered Nurse 
o  · After treatment position/precautions:  
o Up in chair 
o Bed/Chair-wheels locked 
o Call light within reach 
o RN notified · Compliance with Program/Exercises: Will assess as treatment progresses · Recommendations/Intent for next treatment session: \"Next visit will focus on advancements to more challenging activities and reduction in assistance provided\". Total Treatment Duration: PT Patient Time In/Time Out Time In: 1000 Time Out: 1025 Marilyn Fairchild, PT

## 2020-10-09 PROBLEM — I48.91 ATRIAL FIBRILLATION (HCC): Status: ACTIVE | Noted: 2020-10-09

## 2020-10-09 LAB
COVID-19 RAPID TEST, COVR: NOT DETECTED
MM INDURATION POC: 0 MM (ref 0–5)
PPD POC: NEGATIVE NEGATIVE
SARS COV-2, XPGCVT: NEGATIVE
SOURCE, COVRS: NORMAL

## 2020-10-09 PROCEDURE — 74011250636 HC RX REV CODE- 250/636: Performed by: FAMILY MEDICINE

## 2020-10-09 PROCEDURE — 2709999900 HC NON-CHARGEABLE SUPPLY

## 2020-10-09 PROCEDURE — 65270000029 HC RM PRIVATE

## 2020-10-09 PROCEDURE — 97530 THERAPEUTIC ACTIVITIES: CPT

## 2020-10-09 PROCEDURE — 74011250637 HC RX REV CODE- 250/637: Performed by: FAMILY MEDICINE

## 2020-10-09 PROCEDURE — 74011636637 HC RX REV CODE- 636/637: Performed by: FAMILY MEDICINE

## 2020-10-09 PROCEDURE — 97116 GAIT TRAINING THERAPY: CPT

## 2020-10-09 RX ADMIN — ENOXAPARIN SODIUM 30 MG: 30 INJECTION SUBCUTANEOUS at 21:59

## 2020-10-09 RX ADMIN — ASPIRIN 81 MG: 81 TABLET, CHEWABLE ORAL at 11:00

## 2020-10-09 RX ADMIN — PREDNISONE 5 MG: 5 TABLET ORAL at 11:00

## 2020-10-09 RX ADMIN — LEVOTHYROXINE SODIUM 75 MCG: 0.07 TABLET ORAL at 06:29

## 2020-10-09 RX ADMIN — FAMOTIDINE 20 MG: 20 TABLET ORAL at 21:59

## 2020-10-09 RX ADMIN — Medication 5 ML: at 22:00

## 2020-10-09 NOTE — PROGRESS NOTES
Mary Ann Santos has been sleeping until now. Pt awakened and was at first confused about what time of day it was,  But reoriented easily. .  Pt is alert and oriented x's 3,  Speech clear,   Took medications well,

## 2020-10-09 NOTE — PROGRESS NOTES
Progress Note Patient: Jag Millan MRN: [id number]  SSN: [id number] YOB: 1920  Age: 80 y.o. Sex: female Admit Date: 10/6/2020 LOS: 2 days Subjective: F/U CVA 79 yo hx of temporal arteritis currently on prednisone and hypothyroidism who presented to the ED cc left sided facial droop that started around 2 pm on day of admission. Not a TPA candidate. NIH scale 1 on day of admission. Admitted for CVA work up. LDL 96.8 but neurology did not recommend statin due to age. A1C 5.6. MRI brain showed focal subacute ischemic changes involving the right superior cerebellar artery distribution and ECHO showed no shunting or wall motion abnormality, EF 60%, RV pressure 50-55mmHg, moderate mitral valve regurg, right atrium mildly dilated. Carotid US with less than 50% stenosis. Intervention very limited in 80year old since potential complications from surgical procedures high. Medial management alone. Patient also noted to have new onset atrial fibrillation 10/8. I discussed risks versus benefits of starting anticoagulation in a 80year old who is having ambulatory difficulty. Stated increased risk of stroke with atrial fibrillation but also increased risk of intracranial bleeding if she was to fall and cause trauma to head. She states she would rather be on aspirin alone. I have discussed the case with herself and her son. Will discharge with ASA alone once we have a facility. Less red meats and more vegetable diet. Was to be discharged back home with home health on 10/7 but patient did not feel safe to leave on that day and wanted to discuss further with her son with potential dc home on 10/8. On 10/8, patient confused and unable to answer questions appropriately. Family has agreed to rehab at [facility]. Today, able to answer all questions appropriately.  
  
Speech therapy has cleared for regular diet. No chest pain or SOB. Answering all questions appropriately today Current Facility-Administered Medications Medication Dose Route Frequency  famotidine (PEPCID) tablet 20 mg  20 mg Oral QHS  aspirin chewable tablet 81 mg  81 mg Oral DAILY  levothyroxine (SYNTHROID) tablet 50 mcg  50 mcg Oral Once per day on Tue Thu  
 levothyroxine (SYNTHROID) tablet 75 mcg  75 mcg Oral Once per day on Sun Mon Wed Fri Sat  predniSONE (DELTASONE) tablet 5 mg  5 mg Oral DAILY  sodium chloride (NS) flush 5-40 mL  5-40 mL IntraVENous Q8H  
 sodium chloride (NS) flush 5-40 mL  5-40 mL IntraVENous PRN  
 enoxaparin (LOVENOX) injection 30 mg  30 mg SubCUTAneous Q24H Objective:  
 
Vitals:  
 10/08/20 2213 10/09/20 0018 10/09/20 2550 10/09/20 9607 BP: (!) 143/82 (!) 117/56 124/71 (!) 147/68 Pulse: 89 78 82 76 Resp: 16 16 16 16 Temp: 97.9 °F (36.6 °C) 98.1 °F (36.7 °C) 98.3 °F (36.8 °C) 98 °F (36.7 °C) SpO2: 93% 94% 98% 100% Weight:      
Height:      
  
  
Intake and Output: 
Current Shift: No intake/output data recorded. Last three shifts: 10/07 1901 - 10/09 0700 In: 720 [P.O.:720] Out: - Physical Exam:  
General:  Alert, cooperative, no distress Eyes:  Conjunctivae/corneas clear. Ears:  Normal TMs and external ear canals both ears. Nose: Nares normal. Septum midline. Mouth/Throat: Lips, mucosa, and tongue normal.   
Neck:  no JVD. Back:   deferred Lungs:   Clear to auscultation bilaterally. Heart:  Faint blowing systolic murmur best heard at right 2nd intercostal space. Irregularly irregular Abdomen:   Soft, non-tender. Bowel sounds normal.   
Extremities: 5/5 strength to UE and LE bilaterally Pulses: 2+ and symmetric all extremities. Skin: Skin color, texture, turgor normal. No rashes or lesions Lymph nodes: Cervical, supraclavicular, and axillary nodes normal.  
Neurologic: Can tell me the year, who the president is, and where she is located. Normal affect Lab/Data Review: 
 
Recent Results (from the past 24 hour(s)) SARS-COV-2 Collection Time: 10/08/20 11:07 AM  
Result Value Ref Range Specimen source Nasopharyngeal    
 COVID-19 rapid test Not detected NOTD    
 SARS CoV-2 PENDING   
PLEASE READ & DOCUMENT PPD TEST IN 24 HRS Collection Time: 10/08/20 12:05 PM  
Result Value Ref Range PPD    
 mm Induration Assessment/ Plan:  
 
Principal Problem: 
  CVA (cerebral vascular accident) ([facility] [address] (10/6/2020) Active Problems: 
  Acquired hypothyroidism (3/18/2016) Gastroesophageal reflux disease without esophagitis (3/18/2016) Facial droop (10/6/2020) Acute delirium (10/8/2020) Atrial fibrillation ([facility] [address] (10/9/2020) CVA- PT following. Patient and son would feel more comfortable at rehab facility. ASA alone. No statin. Medical management alone A fib - discussed benefits/risks. She would like ASA alone. Does not want to be on anticoagulation. Acute delirium - Certainly with her age and recent CVA she will be more prone to this. Keep alert to day and night. Will order minimal labs to avoid agitation. No delirium noted today. Hypothyroidism - Levothyroxine Temporal arteritis - Chronic prednisone use. I have updated the son Plan to DC Saturday if rolling green rehab DVT prophylaxis - Lovenox Signed By: Delia Mendoza DO October 9, 2020

## 2020-10-09 NOTE — PROGRESS NOTES
Problem: Mobility Impaired (Adult and Pediatric) Goal: *Acute Goals and Plan of Care (Insert Text) Description: LTG: 
(1.)Ms. Petr Page will move from supine to sit and sit to supine  in bed with INDEPENDENT within 5 treatment day(s). (2.)Ms. Petr Page will transfer from bed to chair and chair to bed with SUPERVISION using the least restrictive device within 5 treatment day(s). Met 10/8/20  
(3.)Ms. Petr Page will ambulate with SUPERVISION for 100 feet with the least restrictive device within 5 treatment day(s). Met 10/8/20 NEW GOAL: 
(4.)Ms. Petr Page will ambulate with SUPERVISION for 100 feet without an assistive device within 5 treatment days. ________________________________________________________________________________________________ Outcome: Progressing Towards Goal 
  
PHYSICAL THERAPY: Daily Note and AM 10/9/2020 INPATIENT: PT Visit Days : (P) 3 [place] / [place] B / Product Type: Medicare /   
  
NAME/AGE/GENDER: Rosio Hsu is a 80 y.o. female PRIMARY DIAGNOSIS: CVA (cerebral vascular accident) ([facility] [address] [I63.9] CVA (cerebral vascular accident) ([facility] [address] [I63.9] CVA (cerebral vascular accident) ([facility] [address] CVA (cerebral vascular accident) ([facility] [address] ICD-10: Treatment Diagnosis:  
 · Generalized Muscle Weakness (M62.81) · Difficulty in walking, Not elsewhere classified (R26.2) Precaution/Allergies: 
Amoxicillin; Azithromycin; Ceftriaxone; Cefuroxime; Celecoxib; Chelsey Jethro; Keflex [cephalexin]; Metronidazole; Pepcid [famotidine]; and Sulfamethoxazole-trimethoprim ASSESSMENT:  
 
Ms. Petr Page presents with above diagnosis. Patient demonstrates weakness and impaired balance in sitting and standing. She is typically independent without a device and lives independently in an apartment at [facility]. Patient was able to bridge multiple times in bed to ramona a brace around her hips to manage her hernia.   She got to sitting edge of bed with SBA but took increased time and lost her balance posteriorly multiple times while coming to the edge of the bed. She felt the walker was helpful for mobility in the room. Patient does not feel comfortable returning to her apartment at her current level but stated she does not want skilled nursing. Patient does have some word finding issues and seemed to get confused when discussing different living environments/options at [facility]. Son not present but patient would benefit from some supervision/assistance at d/c. Will continue therapy. 10/8/20:  Patient participated well. Moving better than yesterday. Ambulated increased distance with the walker with supervision only. Ambulated in room without a walker with SBA. Performed LE exercises in standing with UE support. Feel patient needs to return home with assist/supervision instead of SNF but appears patient and son want SNF.   
10/9/20: She is doing better today. Increased amb distance. Worked on advanced standing exs. Going to [facility] This section established at most recent assessment PROBLEM LIST (Impairments causing functional limitations): 1. Decreased Strength 2. Decreased ADL/Functional Activities 3. Decreased Transfer Abilities 4. Decreased Ambulation Ability/Technique 5. Decreased Balance INTERVENTIONS PLANNED: (Benefits and precautions of physical therapy have been discussed with the patient.) 1. Balance Exercise 2. Bed Mobility 3. Family Education 4. Gait Training 5. Home Exercise Program (HEP) 6. Therapeutic Activites 7. Therapeutic Exercise/Strengthening TREATMENT PLAN: Frequency/Duration: daily for duration of hospital stay Rehabilitation Potential For Stated Goals: Good REHAB RECOMMENDATIONS (at time of discharge pending progress):   
Placement:  
It is my opinion, based on this patient's performance to date, that Ms. Salvador Leroy may benefit from participating in 1-2 additional therapy sessions in order to continue to assess for rehab potential and then make recommendation for disposition at discharge. Equipment:  
? None at this time HISTORY:  
History of Present Injury/Illness (Reason for Referral): Lorena Granado is a 80 y.o. female with a past medical history of previous CVA, GERD, temporal arteritis who presents to the ER with report of confusion and left facial droop starting around 2 PM today per report from SNF. Patient is still confused but does deny any weakness, fevers, chills. Is unable to provide further history Past Medical History/Comorbidities: Ms. Salvador Leroy  has a past medical history of Arthritis, Cerebral microvascular disease (2019), CVA (cerebral vascular accident) ([address] (10/6/2020), Dependent edema, GERD (gastroesophageal reflux disease), Headache, Inguinal hernia recurrent bilateral, Stasis dermatitis of both legs, Temporal arteritis ([address] Thyroid disease, and TMJ click (00/8415). She also has no past medical history of CAD (coronary artery disease) or Cancer ([address] Ms. Salvador Leroy  has a past surgical history that includes hx gyn; hx heent; hx orthopaedic; hx endoscopy; hx colonoscopy; and hx gi. Social History/Living Environment:  
Home Environment: Independent living(apartment at [facility]) Care Facility Name: [facility] One/Two Story Residence: One story Living Alone: Yes Support Systems: Child(luma) Patient Expects to be Discharged to[de-identified] Unknown Current DME Used/Available at Home: linn Joseph Prior Level of Function/Work/Activity: 
Independent without a device. Number of Personal Factors/Comorbidities that affect the Plan of Care: 1-2: MODERATE COMPLEXITY EXAMINATION:  
Most Recent Physical Functioning:  
Gross Assessment: 
  
         
  
Posture: 
  
Balance: 
Sitting: Intact Standing: With support Bed Mobility: 
Supine to Sit: Supervision Scooting: Supervision Wheelchair Mobility: 
  
Transfers: 
Sit to Stand: Supervision Stand to Sit: Supervision Stand Pivot Transfers: Supervision Gait: 
  
Base of Support: Narrowed Speed/Brenda: Pace decreased (<100 feet/min) Distance (ft): 288 Feet (ft)(272 X 1) Assistive Device: Walker, rolling Ambulation - Level of Assistance: Stand-by assistance Interventions: Safety awareness training;Verbal cues Duration: 15 Minutes Body Structures Involved: 1. Muscles Body Functions Affected: 1. Movement Related Activities and Participation Affected: 1. Mobility Number of elements that affect the Plan of Care: 3: MODERATE COMPLEXITY CLINICAL PRESENTATION:  
Presentation: Stable and uncomplicated: LOW COMPLEXITY CLINICAL DECISION MAKING:  
[facility] AM-PAC 6 Clicks Basic Mobility Inpatient Short Form How much difficulty does the patient currently have. .. Unable A Lot A Little None 1. Turning over in bed (including adjusting bedclothes, sheets and blankets)? [] 1   [] 2   [x] 3   [] 4  
2. Sitting down on and standing up from a chair with arms ( e.g., wheelchair, bedside commode, etc.)   [] 1   [] 2   [x] 3   [] 4  
3. Moving from lying on back to sitting on the side of the bed? [] 1   [] 2   [x] 3   [] 4 How much help from another person does the patient currently need. .. Total A Lot A Little None 4. Moving to and from a bed to a chair (including a wheelchair)? [] 1   [] 2   [x] 3   [] 4  
5. Need to walk in hospital room? [] 1   [] 2   [x] 3   [] 4  
6. Climbing 3-5 steps with a railing? [] 1   [] 2   [x] 3   [] 4  
© 2007, Trustees of [facility], under license to [employer]. All rights reserved Score:  Initial: 18 Most Recent: X (Date: -- ) Interpretation of Tool:  Represents activities that are increasingly more difficult (i.e. Bed mobility, Transfers, Gait).  
 
Medical Necessity:    
· Patient is expected to demonstrate progress in  
 · strength, balance, and coordination ·  to  
· increase independence with mobility. · . Reason for Services/Other Comments: 
· Patient continues to require skilled intervention due to · Decreased strength and balance affecting mobility and independence. · . Use of outcome tool(s) and clinical judgement create a POC that gives a: Clear prediction of patient's progress: LOW COMPLEXITY  
  
 
 
 
TREATMENT:  
(In addition to Assessment/Re-Assessment sessions the following treatments were rendered) Pre-treatment Symptoms/Complaints:  Feels like she needs to use the RW. Has one at home Pain: Initial:  
Pain Intensity 1: 0  Post Session:  0 Therapeutic Activity: (    ):  Therapeutic activities including bed mobility, toilet transfers, ambulation with and without walker, standing LE exercises, and chair transfer to improve mobility, strength, balance, and coordination. Required minimal verbal cues Safety awareness training;Verbal cues to promote static and dynamic balance in standing and B LE coordination. Gait Training (15 Minutes):  Gait training to improve and/or restore physical functioning as related to mobility, strength and balance. Ambulated 288 Feet (ft)(272 X 1) with Stand-by assistance using a Walker, rolling and minimal Safety awareness training;Verbal cues related to their stride length and posture to promote proper body posture and safety Therapeutic Exercise: (15 Minutes):  Exercises per grid below to improve mobility, strength, balance and coordination. Required minimal visual, verbal and tactile cues to promote proper body posture. Progressed resistance, repetitions and complexity of movement as indicated. Date: 
10/9/20 Date: 
 Date: Activity/Exercise Parameters Parameters Parameters STANDING: ANkle DF/PF 10 Marching  10 Sidestepping 6' Braiding 6' Balance on 1 foot 20 sec Braces/Orthotics/Lines/Etc:  
· telemetry Treatment/Session Assessment:   
· Response to Treatment:  Continues to make steady progress · Interdisciplinary Collaboration:  
o Physical Therapist 
o Registered Nurse · After treatment position/precautions:  
o Up in chair 
o Bed/Chair-wheels locked 
o Call light within reach 
o RN notified · Compliance with Program/Exercises: Will assess as treatment progresses · Recommendations/Intent for next treatment session: \"Next visit will focus on advancements to more challenging activities and reduction in assistance provided\". Total Treatment Duration: PT Patient Time In/Time Out Time In: 9117 Time Out: 7377 Omer Diop PT

## 2020-10-10 VITALS
WEIGHT: 99.43 LBS | RESPIRATION RATE: 16 BRPM | BODY MASS INDEX: 20.04 KG/M2 | OXYGEN SATURATION: 99 % | SYSTOLIC BLOOD PRESSURE: 175 MMHG | DIASTOLIC BLOOD PRESSURE: 90 MMHG | HEART RATE: 90 BPM | TEMPERATURE: 98 F | HEIGHT: 59 IN

## 2020-10-10 PROCEDURE — 74011250637 HC RX REV CODE- 250/637: Performed by: FAMILY MEDICINE

## 2020-10-10 PROCEDURE — 74011636637 HC RX REV CODE- 636/637: Performed by: FAMILY MEDICINE

## 2020-10-10 RX ADMIN — PREDNISONE 5 MG: 5 TABLET ORAL at 09:52

## 2020-10-10 RX ADMIN — ASPIRIN 81 MG: 81 TABLET, CHEWABLE ORAL at 09:52

## 2020-10-10 RX ADMIN — Medication 5 ML: at 06:23

## 2020-10-10 RX ADMIN — LEVOTHYROXINE SODIUM 75 MCG: 0.07 TABLET ORAL at 06:23

## 2020-10-10 NOTE — PROGRESS NOTES
TRANSFER - OUT REPORT: 
 
Verbal report given to Pratibha Granados RN on Tangela Villareal  being transferred to [facility]  for routine progression of care Report consisted of patients Situation, Background, Assessment and  
Recommendations(SBAR). Information from the following report(s) SBAR, Kardex, ED Summary, Intake/Output and MAR was reviewed with the receiving nurse. Lines:    
 
Opportunity for questions and clarification was provided.

## 2020-10-10 NOTE — DISCHARGE SUMMARY
Hospitalist Discharge Summary Patient ID: 
Carmen Ny [id number] 
56 y.o. 
11/12/1920 Admit date: 10/6/2020  5:13 PM 
Discharge date and time: 10/10/2020 Attending: Lynn Cruz DO 
PCP:  Amina Fernández MD 
Treatment Team: Attending Provider: Lynn Cruz DO; Utilization Review: Prudencio Leyden, RN; Care Manager: Fay Horn RN; Primary Nurse: Rosaura Aranda RN; Physical Therapist: Elaine Burnette PT; Occupational Therapist: Michial Landau, OT 
 
Principal Diagnosis CVA (cerebral vascular accident) [facility]) Principal Problem: 
  CVA (cerebral vascular accident) ([facility] [address] (10/6/2020) Active Problems: 
  Acquired hypothyroidism (3/18/2016) Gastroesophageal reflux disease without esophagitis (3/18/2016) Facial droop (10/6/2020) Acute delirium (10/8/2020) Atrial fibrillation ([facility] [address] (10/9/2020) Hospital Course: 79 yo hx of temporal arteritis currently on prednisone and hypothyroidism who presented to the ED cc left sided facial droop that started around 2 pm on day of admission. Not a TPA candidate. NIH scale 1 on day of admission. Admitted for CVA work up. LDL 96.8 but neurology did not recommend statin due to age. A1C 5.6. MRI brain showed focal subacute ischemic changes involving the right superior cerebellar artery distribution and ECHO showed no shunting or wall motion abnormality, EF 60%, RV pressure 50-55mmHg, moderate mitral valve regurg, right atrium mildly dilated. Carotid US with less than 50% stenosis. Intervention very limited in 80year old since potential complications from surgical procedures high. Medial management alone. Patient also noted to have new onset atrial fibrillation 10/8. I discussed risks versus benefits of starting anticoagulation in a 80year old who is having ambulatory difficulty.  Stated increased risk of stroke with atrial fibrillation but also increased risk of intracranial bleeding if she was to fall and cause trauma to head. She states she would rather be on aspirin alone. I have discussed the case with herself and her son. Will discharge with ASA alone once we have a facility. Less red meats and more vegetable diet.  
  
Was to be discharged back home with home health on 10/7 but patient did not feel safe to leave on that day and wanted to discuss further with her son with potential dc home on 10/8. On 10/8, patient confused and unable to answer questions appropriately. Family has agreed to rehab at [facility]. Today, able to answer all questions appropriately.  
  
Speech therapy has cleared for regular diet. May benefit from speech therapy at rehab. If worsening AMS, weakness, please come back to the ED. Please refer to the admission H&P for details of presentation. In summary, the patient is stable for discharge. Significant Diagnostic Studies:  
 
 
Labs: Results:  
   
Chemistry No results for input(s): GLU, NA, K, CL, CO2, BUN, CREA, CA, AGAP, BUCR, TBIL, AP, TP, ALB, GLOB, AGRAT in the last 72 hours. No lab exists for component: GPT  
CBC w/Diff No results for input(s): WBC, RBC, HGB, HCT, PLT, GRANS, LYMPH, EOS, HGBEXT, HCTEXT, PLTEXT in the last 72 hours. Cardiac Enzymes No results for input(s): CPK, CKND1, MARTINA in the last 72 hours. No lab exists for component: [place] Bone Coagulation No results for input(s): PTP, INR, APTT, INREXT in the last 72 hours. Lipid Panel Lab Results Component Value Date/Time Cholesterol, total 194 10/07/2020 04:39 AM  
 HDL Cholesterol 74 (H) 10/07/2020 04:39 AM  
 LDL, calculated 96.8 10/07/2020 04:39 AM  
 VLDL, calculated 23.2 (H) 10/07/2020 04:39 AM  
 Triglyceride 116 10/07/2020 04:39 AM  
 CHOL/HDL Ratio 2.6 10/07/2020 04:39 AM  
  
BNP No results for input(s): BNPP in the last 72 hours. Liver Enzymes No results for input(s): TP, ALB, TBIL, AP in the last 72 hours.  
 
No lab exists for component: SGOT, GPT, DBIL  
 Thyroid Studies Lab Results Component Value Date/Time TSH 2.610 09/01/2020 11:45 AM  
    
 
 
Discharge Exam: 
Visit Vitals BP (!) 156/82 Pulse 81 Temp 98.2 °F (36.8 °C) Resp 16 Ht 4' 11\" (1.499 m) Wt 45.1 kg (99 lb 6.8 oz) SpO2 99% Breastfeeding No  
BMI 20.08 kg/m² General appearance: alert, cooperative, no distress, ambulating without difficulty in the room with a walker. Slow to answer questions at times but correctly answers all questions. Lungs: clear to auscultation bilaterally Heart: irregularly irregular Abdomen: soft, non-tender. Bowel sounds normal.  
Extremities: no cyanosis or edema Neurologic: Grossly normal 
 
Disposition:Rehab Discharge Condition: stable Patient Instructions: As above Current Discharge Medication List  
  
START taking these medications Details  
aspirin delayed-release 81 mg tablet Take 1 Tab by mouth daily. Qty: 30 Tab, Refills: 0 CONTINUE these medications which have NOT CHANGED Details  
alum-mag hydroxide-simeth (MYLANTA) 200-200-20 mg/5 mL susp Take 30 mL by mouth every four (4) hours as needed. !! levothyroxine (SYNTHROID) 50 mcg tablet Take 1 tablet every Tuesday and Thursday. Qty: 24 Tab, Refills: 5  
  
!! levothyroxine (SYNTHROID) 75 mcg tablet [id number] tablet by mouth 5 days a week. On Tuesday and Thursday only takes 50mcg tablet. Indications: a condition with low thyroid hormone levels Qty: 60 Tab, Refills: 5  
  
predniSONE (DELTASONE) 5 mg tablet Take 1 Tab by mouth daily. Qty: 90 Tab, Refills: 2  
  
famotidine (PEPCID) 20 mg tablet Take 1 Tab by mouth daily. Qty: 90 Tab, Refills: 3 Associated Diagnoses: Contact dermatitis, unspecified contact dermatitis type, unspecified trigger  
  
 !! - Potential duplicate medications found. Please discuss with provider. Activity: Up and marianne with rolling walker Diet:Cardiac Wound Care:None  
  
Follow-up PCP in one week.   
·    
 Time spent to discharge patient 35 minutes Signed: 
Dashawn Taylor DO 
10/10/2020 
7:32 AM

## 2020-10-10 NOTE — PROGRESS NOTES
Received patient awake, alert and oriented. Denies needs at this time. No complaints. No deficits noted. Nursing assessment completed. Bed in low and locked position with call bell within patient's reach.

## 2020-10-10 NOTE — PROGRESS NOTES
Care Management Interventions PCP Verified by CM: Yes 
Palliative Care Criteria Met (RRAT>21 & CHF Dx)?: No 
Mode of Transport at Discharge: Self Transition of Care Consult (CM Consult): Discharge Planning, SNF Discharge Durable Medical Equipment: No 
Physical Therapy Consult: Yes Occupational Therapy Consult: Yes Speech Therapy Consult: Yes Current Support Network: Lives Alone Confirm Follow Up Transport: Family The Plan for Transition of Care is Related to the Following Treatment Goals : debility, S/P CVA The Patient and/or Patient Representative was Provided with a Choice of Provider and Agrees with the Discharge Plan?: Yes Freedom of Choice List was Provided with Basic Dialogue that Supports the Patient's Individualized Plan of Care/Goals, Treatment Preferences and Shares the Quality Data Associated with the Providers?: Yes Discharge Location Discharge Placement: Skilled nursing facility [facility]

## 2020-10-12 ENCOUNTER — PATIENT OUTREACH (OUTPATIENT)
Dept: CASE MANAGEMENT | Age: 85
End: 2020-10-12

## 2020-10-12 NOTE — PROGRESS NOTES
Patient discharged to a SNF Preferred Provider Network facility [facility] Callie Mcdonald . Patient will be included in weekly care coordination calls. Message sent to Jeff Ramey RN SNF Preferred Provider Marilou Barroso.

## 2020-10-15 ENCOUNTER — PATIENT OUTREACH (OUTPATIENT)
Dept: CASE MANAGEMENT | Age: 85
End: 2020-10-15

## 2020-10-15 NOTE — PROGRESS NOTES
Community Care Team documentation for patient in [facility]    The information below provided by:JOSE LUIS    PT Update: too high level.  Was not picked up by therapy        Nursing Update:no skilled needs      Discharge Date:10/13/20 W/ Interim       Assign to [id number]

## 2020-10-16 ENCOUNTER — PATIENT OUTREACH (OUTPATIENT)
Dept: CASE MANAGEMENT | Age: 85
End: 2020-10-16

## 2020-10-16 NOTE — PROGRESS NOTES
Transition of Care SNF Discharge Follow-Up Date/Time:  10/16/2020 2:11 PM 
 
Patient was admitted to [facility] on 10/6/20 and discharged on 10/10/20 for CVA  Contacted after discharge from SNF [facility] ) on 10/13/20 LPN Care Coordinator contacted the patient by telephone to perform post hospital discharge assessment. Verified name and  with patient as identifiers. Provided introduction to self, and explanation of the Care Coordinator role. Patient received discharge instructions. LPN reviewed discharge instructions and red flags with patient who verbalized understanding. Patient given an opportunity to ask questions and does not have any further questions or concerns at this time. The patient agrees to contact the PCP office for questions related to their healthcare. LPN provided contact information for future reference. Patients top risk factors for readmission:  Functional physical ability Home Health orders at discharge: PT Home Health company: [facility] Date of initial or scheduled visit: No visit at present Durable Medical Equipment ordered at discharge: None patient has rollator that she uses at home to assist with ambulation. [facility]: N/A Durable Medical Equipment received: N/A Medication(s):  
Medications at Discharge: No changes in medications Medication review was performed with patient, who verbalizes understanding of administration of home medications. There were no barriers to obtaining medications identified at this time. Current Outpatient Medications Medication Sig  
 aspirin delayed-release 81 mg tablet Take 1 Tab by mouth daily.  alum-mag hydroxide-simeth (MYLANTA) 200-200-20 mg/5 mL susp Take 30 mL by mouth every four (4) hours as needed.  levothyroxine (SYNTHROID) 50 mcg tablet Take 1 tablet every Tuesday and Thursday.  levothyroxine (SYNTHROID) 75 mcg tablet [id number] tablet by mouth 5 days a week. On Tuesday and Thursday only takes 50mcg tablet. Indications: a condition with low thyroid hormone levels  predniSONE (DELTASONE) 5 mg tablet Take 1 Tab by mouth daily.  famotidine (PEPCID) 20 mg tablet Take 1 Tab by mouth daily. No current facility-administered medications for this visit. There are no discontinued medications. ADL assessment:  
Patient lives alone and currently residing at independent living at [facility]. Patient independent with ADL's. [facility] follow up appointment(s):  
Future Appointments Date Time Provider Audrey Darling 3/10/2021 11:40 AM Saige Phelan MD [facility] SIM Non-[facility] follow up appointment(s): None scheduled 7 Day follow up with PCP or Specialist: Patient to call PCP Dr. Kajal Luna on Monday and schedule a FU appointment. Transportation arranged: [facility] will provide transportation. Any other questions or concerns expressed by patient? Patient did not verbalize any questions or concerns. Schedule next appointment with TRENT or referral to CTN/ ACM: 
Next follow up call:14 days Goals  Returns to baseline activity level.

## 2020-11-02 ENCOUNTER — PATIENT OUTREACH (OUTPATIENT)
Dept: CASE MANAGEMENT | Age: 85
End: 2020-11-02

## 2020-11-02 NOTE — PROGRESS NOTES
Transition of Care SNF Discharge Follow-Up Date/Time:  2020 3:46 PM 
 
LPN Care Coordinator contacted the patient by telephone to perform post hospital follow up. Verified name and  with patient as identifiers. LPN reinforced discharge instructions and red flags with patient who verbalized understanding. Patient given an opportunity to ask questions and does not have any further questions or concerns at this time. The patient agrees to contact the PCP office for questions related to their healthcare [facility] follow up appointment(s):  
Future Appointments Date Time Provider Audrey Darling 3/10/2021 11:40 AM Russell Phelan MD [facility] SIM Non-[facility] follow up appointment(s): None scheduled Patient attended follow up appointments since last contact: No 
What was the outcome of the appointment: N/A Home Health Active: Patient states [facility] came to her home  for initial evaluation and they did not recommend PT at this time. Home Health company: [facility] [place] Any issues/ progress:Not engaged Medication(s):  
Medication changes since discharge:  
 
Current Outpatient Medications Medication Sig  
 aspirin delayed-release 81 mg tablet Take 1 Tab by mouth daily.  alum-mag hydroxide-simeth (MYLANTA) 200-200-20 mg/5 mL susp Take 30 mL by mouth every four (4) hours as needed.  levothyroxine (SYNTHROID) 50 mcg tablet Take 1 tablet every Tuesday and Thursday.  levothyroxine (SYNTHROID) 75 mcg tablet [id number] tablet by mouth 5 days a week. On Tuesday and Thursday only takes 50mcg tablet. Indications: a condition with low thyroid hormone levels  predniSONE (DELTASONE) 5 mg tablet Take 1 Tab by mouth daily.  famotidine (PEPCID) 20 mg tablet Take 1 Tab by mouth daily. No current facility-administered medications for this visit. Other Issues/ Miscellaneous? No problems  with transportation . Patient states she resides independent living at [facility]. She states she is independent with ADL's. Referrals needed? N0 Any other questions or concerns expressed by patient? Patient did not voice any questions or concerns. Schedule next appointment with TRENT or referral to CTN/ ACM: 
Graduation: 
Next Outreach Scheduled: 14 days Goals  Returns to baseline activity level.

## 2020-11-17 ENCOUNTER — PATIENT OUTREACH (OUTPATIENT)
Dept: CASE MANAGEMENT | Age: 85
End: 2020-11-17

## 2020-11-17 NOTE — PROGRESS NOTES
Transition of Care Hospital Discharge Follow-Up      Date/Time:  2020 3:10 PM    LPN Care Coordinator contacted the patient by telephone to perform post hospital follow up. Verified name and  with patient as identifiers. LPN reinforced discharge instructions and red flags with patient who verbalized understanding. Patient given an opportunity to ask questions and does not have any further questions or concerns at this time. The patient agrees to contact the PCP office for questions related to their healthcare    [facility] follow up appointment(s):   Future Appointments   Date Time Provider Audrey Darling   3/10/2021 11:40 AM Eric Sosa MD [facility] SIM      Non-[facility] follow up appointment(s): None scheduled   Patient attended follow up appointments since last contact: No  What was the outcome of the appointment:     [address]: No  [address] [place] [address] Way:   Any issues/ progress:        Medication(s):   Medication changes since discharge:     Current Outpatient Medications   Medication Sig    aspirin delayed-release 81 mg tablet Take 1 Tab by mouth daily.  alum-mag hydroxide-simeth (MYLANTA) 200-200-20 mg/5 mL susp Take 30 mL by mouth every four (4) hours as needed.  levothyroxine (SYNTHROID) 50 mcg tablet Take 1 tablet every Tuesday and Thursday.  levothyroxine (SYNTHROID) 75 mcg tablet [id number] tablet by mouth 5 days a week. On Tuesday and Thursday only takes 50mcg tablet. Indications: a condition with low thyroid hormone levels    predniSONE (DELTASONE) 5 mg tablet Take 1 Tab by mouth daily.  famotidine (PEPCID) 20 mg tablet Take 1 Tab by mouth daily. No current facility-administered medications for this visit. Other Issues/ Miscellaneous? No problems with transportation. Patient continues to reside at [facility] in [place] [address] . Referrals needed? No    Any other questions or concerns expressed by patient? Patient did not voice any questions or concerns. Schedule next appointment with TRENT or referral to CTN/ ACM:  Graduation:Yes   Next Outreach Scheduled:     Goals      Returns to baseline activity level. Patient states she is ambulating at home without difficulties. [facility] mad initial evaluation and did not feel like she needed PT at this present time.

## 2021-01-09 NOTE — PROGRESS NOTES
Community Care Team documentation for patient in [facility]    The information below provided by:JOSE LUIS [birth year]    PT Update: PT/OT. Sup for safety with transfers .  Able to ambulate with RW for 250 feet        Nursing Update:Stable      Discharge Date:3/6/19      Assign to [address] [place] [address]
<-- Click to add NO significant Past Surgical History

## 2021-03-15 PROBLEM — D64.9 ANEMIA: Status: ACTIVE | Noted: 2021-03-15

## 2021-03-16 ENCOUNTER — HOSPITAL ENCOUNTER (INPATIENT)
Age: 86
LOS: 1 days | Discharge: HOME HEALTH | DRG: 812 | End: 2021-03-19
Attending: EMERGENCY MEDICINE | Admitting: INTERNAL MEDICINE
Payer: MEDICARE

## 2021-03-16 ENCOUNTER — APPOINTMENT (OUTPATIENT)
Dept: GENERAL RADIOLOGY | Age: 86
DRG: 812 | End: 2021-03-16
Attending: EMERGENCY MEDICINE
Payer: MEDICARE

## 2021-03-16 DIAGNOSIS — R06.02 SOB (SHORTNESS OF BREATH): ICD-10-CM

## 2021-03-16 DIAGNOSIS — R53.83 MALAISE AND FATIGUE: Primary | ICD-10-CM

## 2021-03-16 DIAGNOSIS — I48.21 PERMANENT ATRIAL FIBRILLATION (HCC): ICD-10-CM

## 2021-03-16 DIAGNOSIS — R53.81 MALAISE AND FATIGUE: Primary | ICD-10-CM

## 2021-03-16 DIAGNOSIS — R00.1 BRADYCARDIA: ICD-10-CM

## 2021-03-16 DIAGNOSIS — D64.9 ANEMIA, UNSPECIFIED TYPE: ICD-10-CM

## 2021-03-16 PROBLEM — D50.0 ANEMIA, BLOOD LOSS: Status: ACTIVE | Noted: 2021-03-16

## 2021-03-16 PROBLEM — K21.9 GERD (GASTROESOPHAGEAL REFLUX DISEASE): Status: ACTIVE | Noted: 2021-03-16

## 2021-03-16 PROBLEM — J90 PLEURAL EFFUSION: Status: ACTIVE | Noted: 2021-03-16

## 2021-03-16 PROBLEM — N18.32 STAGE 3B CHRONIC KIDNEY DISEASE (HCC): Status: ACTIVE | Noted: 2021-03-16

## 2021-03-16 PROBLEM — Z86.73 H/O: CVA (CEREBROVASCULAR ACCIDENT): Status: ACTIVE | Noted: 2021-03-16

## 2021-03-16 PROBLEM — I34.0 MODERATE MITRAL REGURGITATION: Status: ACTIVE | Noted: 2021-03-16

## 2021-03-16 PROBLEM — R06.00 DYSPNEA: Status: ACTIVE | Noted: 2021-03-16

## 2021-03-16 PROBLEM — I50.32 DIASTOLIC CHF, CHRONIC (HCC): Status: ACTIVE | Noted: 2021-03-16

## 2021-03-16 PROBLEM — I07.1 MODERATE TRICUSPID REGURGITATION: Status: ACTIVE | Noted: 2021-03-16

## 2021-03-16 LAB
ALBUMIN SERPL-MCNC: 3.4 G/DL (ref 3.2–4.6)
ALBUMIN/GLOB SERPL: 1 {RATIO} (ref 1.2–3.5)
ALP SERPL-CCNC: 89 U/L (ref 50–136)
ALT SERPL-CCNC: 133 U/L (ref 12–65)
ANION GAP SERPL CALC-SCNC: 8 MMOL/L (ref 7–16)
AST SERPL-CCNC: 75 U/L (ref 15–37)
ATRIAL RATE: 138 BPM
BASOPHILS # BLD: 0 K/UL (ref 0–0.2)
BASOPHILS NFR BLD: 0 % (ref 0–2)
BILIRUB SERPL-MCNC: 0.4 MG/DL (ref 0.2–1.1)
BNP SERPL-MCNC: 1951 PG/ML
BUN SERPL-MCNC: 38 MG/DL (ref 8–23)
CALCIUM SERPL-MCNC: 8.9 MG/DL (ref 8.3–10.4)
CALCULATED R AXIS, ECG10: 40 DEGREES
CALCULATED T AXIS, ECG11: 32 DEGREES
CHLORIDE SERPL-SCNC: 109 MMOL/L (ref 98–107)
CO2 SERPL-SCNC: 23 MMOL/L (ref 21–32)
CREAT SERPL-MCNC: 1.33 MG/DL (ref 0.6–1)
DIAGNOSIS, 93000: NORMAL
DIFFERENTIAL METHOD BLD: ABNORMAL
EOSINOPHIL # BLD: 0 K/UL (ref 0–0.8)
EOSINOPHIL NFR BLD: 0 % (ref 0.5–7.8)
ERYTHROCYTE [DISTWIDTH] IN BLOOD BY AUTOMATED COUNT: 17.2 % (ref 11.9–14.6)
GLOBULIN SER CALC-MCNC: 3.4 G/DL (ref 2.3–3.5)
GLUCOSE SERPL-MCNC: 128 MG/DL (ref 65–100)
HCT VFR BLD AUTO: 24.6 % (ref 35.8–46.3)
HGB BLD-MCNC: 6.8 G/DL (ref 11.7–15.4)
HGB RETIC QN AUTO: 18 PG (ref 29–35)
HISTORY CHECKED?,CKHIST: NORMAL
IMM GRANULOCYTES # BLD AUTO: 0 K/UL (ref 0–0.5)
IMM GRANULOCYTES NFR BLD AUTO: 0 % (ref 0–5)
IMM RETICS NFR: 25.7 % (ref 3–15.9)
LYMPHOCYTES # BLD: 0.9 K/UL (ref 0.5–4.6)
LYMPHOCYTES NFR BLD: 12 % (ref 13–44)
MCH RBC QN AUTO: 19.7 PG (ref 26.1–32.9)
MCHC RBC AUTO-ENTMCNC: 27.6 G/DL (ref 31.4–35)
MCV RBC AUTO: 71.1 FL (ref 79.6–97.8)
MONOCYTES # BLD: 0.4 K/UL (ref 0.1–1.3)
MONOCYTES NFR BLD: 5 % (ref 4–12)
NEUTS SEG # BLD: 6 K/UL (ref 1.7–8.2)
NEUTS SEG NFR BLD: 82 % (ref 43–78)
NRBC # BLD: 0.02 K/UL (ref 0–0.2)
PLATELET # BLD AUTO: 300 K/UL (ref 150–450)
PMV BLD AUTO: 10.5 FL (ref 9.4–12.3)
POTASSIUM SERPL-SCNC: 4.6 MMOL/L (ref 3.5–5.1)
PROT SERPL-MCNC: 6.8 G/DL (ref 6.3–8.2)
Q-T INTERVAL, ECG07: 386 MS
QRS DURATION, ECG06: 82 MS
QTC CALCULATION (BEZET), ECG08: 448 MS
RBC # BLD AUTO: 3.46 M/UL (ref 4.05–5.2)
RETICS # AUTO: 0.05 M/UL (ref 0.03–0.1)
RETICS/RBC NFR AUTO: 1.5 % (ref 0.3–2)
SODIUM SERPL-SCNC: 140 MMOL/L (ref 136–145)
VENTRICULAR RATE, ECG03: 81 BPM
WBC # BLD AUTO: 7.4 K/UL (ref 4.3–11.1)

## 2021-03-16 PROCEDURE — 74011000250 HC RX REV CODE- 250: Performed by: EMERGENCY MEDICINE

## 2021-03-16 PROCEDURE — C9113 INJ PANTOPRAZOLE SODIUM, VIA: HCPCS | Performed by: EMERGENCY MEDICINE

## 2021-03-16 PROCEDURE — 2709999900 HC NON-CHARGEABLE SUPPLY

## 2021-03-16 PROCEDURE — 74011250636 HC RX REV CODE- 250/636: Performed by: EMERGENCY MEDICINE

## 2021-03-16 PROCEDURE — 83880 ASSAY OF NATRIURETIC PEPTIDE: CPT

## 2021-03-16 PROCEDURE — 71045 X-RAY EXAM CHEST 1 VIEW: CPT

## 2021-03-16 PROCEDURE — 36430 TRANSFUSION BLD/BLD COMPNT: CPT

## 2021-03-16 PROCEDURE — 30233N1 TRANSFUSION OF NONAUTOLOGOUS RED BLOOD CELLS INTO PERIPHERAL VEIN, PERCUTANEOUS APPROACH: ICD-10-PCS | Performed by: INTERNAL MEDICINE

## 2021-03-16 PROCEDURE — 74011250637 HC RX REV CODE- 250/637: Performed by: FAMILY MEDICINE

## 2021-03-16 PROCEDURE — 99284 EMERGENCY DEPT VISIT MOD MDM: CPT

## 2021-03-16 PROCEDURE — 85046 RETICYTE/HGB CONCENTRATE: CPT

## 2021-03-16 PROCEDURE — 93005 ELECTROCARDIOGRAM TRACING: CPT | Performed by: EMERGENCY MEDICINE

## 2021-03-16 PROCEDURE — 86923 COMPATIBILITY TEST ELECTRIC: CPT

## 2021-03-16 PROCEDURE — 99218 HC RM OBSERVATION: CPT

## 2021-03-16 PROCEDURE — P9016 RBC LEUKOCYTES REDUCED: HCPCS

## 2021-03-16 PROCEDURE — 77030040361 HC SLV COMPR DVT MDII -B

## 2021-03-16 PROCEDURE — 80053 COMPREHEN METABOLIC PANEL: CPT

## 2021-03-16 PROCEDURE — 86901 BLOOD TYPING SEROLOGIC RH(D): CPT

## 2021-03-16 PROCEDURE — 85025 COMPLETE CBC W/AUTO DIFF WBC: CPT

## 2021-03-16 PROCEDURE — 96374 THER/PROPH/DIAG INJ IV PUSH: CPT

## 2021-03-16 RX ORDER — SODIUM CHLORIDE 0.9 % (FLUSH) 0.9 %
5-40 SYRINGE (ML) INJECTION AS NEEDED
Status: DISCONTINUED | OUTPATIENT
Start: 2021-03-16 | End: 2021-03-19 | Stop reason: HOSPADM

## 2021-03-16 RX ORDER — LEVOTHYROXINE SODIUM 50 UG/1
50 TABLET ORAL
Status: DISCONTINUED | OUTPATIENT
Start: 2021-03-18 | End: 2021-03-19 | Stop reason: HOSPADM

## 2021-03-16 RX ORDER — SODIUM CHLORIDE 9 MG/ML
250 INJECTION, SOLUTION INTRAVENOUS AS NEEDED
Status: DISCONTINUED | OUTPATIENT
Start: 2021-03-16 | End: 2021-03-19 | Stop reason: HOSPADM

## 2021-03-16 RX ORDER — PANTOPRAZOLE SODIUM 40 MG/1
40 TABLET, DELAYED RELEASE ORAL
Status: DISCONTINUED | OUTPATIENT
Start: 2021-03-16 | End: 2021-03-19 | Stop reason: HOSPADM

## 2021-03-16 RX ORDER — ACETAMINOPHEN 325 MG/1
650 TABLET ORAL
Status: DISCONTINUED | OUTPATIENT
Start: 2021-03-16 | End: 2021-03-19 | Stop reason: HOSPADM

## 2021-03-16 RX ORDER — SODIUM CHLORIDE 0.9 % (FLUSH) 0.9 %
5-40 SYRINGE (ML) INJECTION EVERY 8 HOURS
Status: DISCONTINUED | OUTPATIENT
Start: 2021-03-16 | End: 2021-03-19 | Stop reason: HOSPADM

## 2021-03-16 RX ORDER — ONDANSETRON 2 MG/ML
4 INJECTION INTRAMUSCULAR; INTRAVENOUS
Status: DISCONTINUED | OUTPATIENT
Start: 2021-03-16 | End: 2021-03-19 | Stop reason: HOSPADM

## 2021-03-16 RX ORDER — LEVOTHYROXINE SODIUM 50 UG/1
75 TABLET ORAL
Status: DISCONTINUED | OUTPATIENT
Start: 2021-03-17 | End: 2021-03-16

## 2021-03-16 RX ORDER — POLYETHYLENE GLYCOL 3350 17 G/17G
17 POWDER, FOR SOLUTION ORAL DAILY PRN
Status: DISCONTINUED | OUTPATIENT
Start: 2021-03-16 | End: 2021-03-19 | Stop reason: HOSPADM

## 2021-03-16 RX ORDER — LEVOTHYROXINE SODIUM 75 UG/1
75 TABLET ORAL
Status: DISCONTINUED | OUTPATIENT
Start: 2021-03-17 | End: 2021-03-19 | Stop reason: HOSPADM

## 2021-03-16 RX ORDER — LABETALOL HYDROCHLORIDE 5 MG/ML
10 INJECTION, SOLUTION INTRAVENOUS
Status: DISCONTINUED | OUTPATIENT
Start: 2021-03-16 | End: 2021-03-17

## 2021-03-16 RX ORDER — ACETAMINOPHEN 650 MG/1
650 SUPPOSITORY RECTAL
Status: DISCONTINUED | OUTPATIENT
Start: 2021-03-16 | End: 2021-03-19 | Stop reason: HOSPADM

## 2021-03-16 RX ORDER — PREDNISONE 10 MG/1
5 TABLET ORAL DAILY
Status: DISCONTINUED | OUTPATIENT
Start: 2021-03-17 | End: 2021-03-19 | Stop reason: HOSPADM

## 2021-03-16 RX ORDER — PROMETHAZINE HYDROCHLORIDE 25 MG/1
12.5 TABLET ORAL
Status: DISCONTINUED | OUTPATIENT
Start: 2021-03-16 | End: 2021-03-19 | Stop reason: HOSPADM

## 2021-03-16 RX ORDER — SUCRALFATE 1 G/1
1 TABLET ORAL
Status: DISCONTINUED | OUTPATIENT
Start: 2021-03-16 | End: 2021-03-19 | Stop reason: HOSPADM

## 2021-03-16 RX ORDER — ASPIRIN 81 MG/1
81 TABLET ORAL DAILY
Status: DISCONTINUED | OUTPATIENT
Start: 2021-03-17 | End: 2021-03-19 | Stop reason: HOSPADM

## 2021-03-16 RX ADMIN — PANTOPRAZOLE SODIUM 40 MG: 40 TABLET, DELAYED RELEASE ORAL at 21:18

## 2021-03-16 RX ADMIN — SUCRALFATE 1 G: 1 TABLET ORAL at 21:17

## 2021-03-16 RX ADMIN — Medication 10 ML: at 21:21

## 2021-03-16 RX ADMIN — SODIUM CHLORIDE 40 MG: 9 INJECTION, SOLUTION INTRAMUSCULAR; INTRAVENOUS; SUBCUTANEOUS at 16:31

## 2021-03-16 NOTE — ED TRIAGE NOTES
Pt arrived via POV from PCP office c/o low hemoglobin. Pt states that she was sent here to have \"tests\" done to see where she is bleeding from. Reports that she has had dark stools but denies N/V. Reports all over weakness and increased shob.

## 2021-03-16 NOTE — H&P
Hospitalist H&P Note     Admit Date:  3/16/2021  4:03 PM   Name:  Erendira Short   Age:  80 y.o.  :  1920   MRN:  [id number]   PCP:  Erwin Smith MD  Treatment Team: Attending Provider: Dannielle Garcias MD; Student Nurse: Juan Childers RN; Charge Nurse: Marlene HOLLAND    Assessment and plan-    Anemia secondary to blood loss-history of melena-last about 2 to 3 weeks ago. Heme occult negative in ER. Continue Protonix twice daily. Patient is getting 1 unit of packed red cell. Repeat CBC in the morning. GI consult in the morning. Severe acid reflux-as per patient-sometimes even has to take a half a dose of baby aspirin. Continue Protonix and Carafate  Symptomatic anemia-exertional dyspnea. Small right pleural effusion -last echo in October moderate mitral regurgitation and aortic regurgitation  Diastolic dysfunction chronic-based on echo 2020. CKD 3  Hypothyroidism  History of A. Fib-as per previous discharge summary patient decided on only aspirin. Temporal arteritis-continue prednisone        DVT ppx: scd   Anticipated DC needs:    Code status:  Full  Estimated LOS: Less than 2 midnights  Risk:  high            Shortness of breath, low hemoglobin  HPI:   8 year-old  female patient with a known history of hypothyroidism history of stroke in 2020, A. fib only on aspirin, temporal arteritis on prednisone, CVA acid reflux and arthritis. Based on the labs CKD stage III. Patient son in the room. According to patient son, patient has been having exertional dyspnea going on for several months, got worse during the past 1 week, moderate intensity. Patient was seen by her primary care physician yesterday, had blood work done, found to be anemic, advised to patient to come to emergency room today. Patient otherwise does not complain of any chest pain or any headache or dizziness or any nausea vomiting or abdominal pain.   No history of NSAID use.    Does complain of on and off black stools, the last was around 2 to 3 weeks ago. Hemoglobin of 6.8. Chest x-ray-new small right pleural effusion. EKG atrial fibrillation. Patient be admitted for symptomatic anemia, probable GI bleed with history of melena. 10 systems reviewed and negative except as noted in HPI.   Past Medical History:   Diagnosis Date    Arthritis     Atrial fibrillation ([facility] 10/2020    Cerebral microvascular disease 2019    CT, MRI, Carotid U/S    CVA (cerebral vascular accident) ([facility] 10/6/2020    Dependent edema     GERD (gastroesophageal reflux disease)     Headache     ocular migraines    Inguinal hernia recurrent bilateral     Stasis dermatitis of both legs     Temporal arteritis (HCC)     right temple- empiric tx with steroids    Thyroid disease     hypothyroid    TMJ click 48/5280    bilateral      Past Surgical History:   Procedure Laterality Date    HX COLONOSCOPY      HX ENDOSCOPY      HH, gastritis,GERD    HX GI      Antibiotics cause diarrhea    HX GYN      Csection    HX HEENT      Bilateral Cataract    HX ORTHOPAEDIC      wrist surg for fracture      Allergies   Allergen Reactions    Amoxicillin Diarrhea    Azithromycin Diarrhea    Ceftriaxone Diarrhea    Cefuroxime Diarrhea    Celecoxib Diarrhea    Dexilant [Dexlansoprazole] Diarrhea    Keflex [Cephalexin] Diarrhea    Metronidazole Diarrhea    Pepcid [Famotidine] Diarrhea    Sulfamethoxazole-Trimethoprim Diarrhea      Social History     Tobacco Use    Smoking status: Never Smoker    Smokeless tobacco: Never Used   Substance Use Topics    Alcohol use: No      Family History   Problem Relation Age of Onset    Stroke Mother       Immunization History   Administered Date(s) Administered    Covid-19, MODERNA, Mrna, Lnp-s, Pf, 100mcg/0.5mL 01/23/2021, 02/17/2021    Influenza High Dose Vaccine PF 09/03/2019    Influenza Vaccine 10/01/2015, 09/30/2017, 09/09/2018    Influenza Vaccine (Tri) Adjuvanted (>65 Yrs FLUAD TRI 72859) 09/09/2018    Influenza, Quadrivalent, Adjuvanted (>65 Yrs FLUAD QUAD 89426) 09/13/2020    Pneumococcal Vaccine (Unspecified Type) 01/01/2001    TB Skin Test (PPD) Intradermal 02/19/2019, 10/07/2020     PTA Medications:  Prior to Admission Medications   Prescriptions Last Dose Informant Patient Reported? Taking?   aspirin delayed-release 81 mg tablet   No No   Sig: Take 1 Tab by mouth daily. famotidine (PEPCID) 20 mg tablet   No No   Sig: Take 1 Tab by mouth two (2) times a day. Indications: gastroesophageal reflux disease   levothyroxine (SYNTHROID) 50 mcg tablet   No No   Sig: Take 1 tablet every Tuesday and Thursday. levothyroxine (SYNTHROID) 75 mcg tablet   No No   Sig: [id number] tablet by mouth 5 days a week. On Tuesday and Thursday only takes 50mcg tablet. Indications: a condition with low thyroid hormone levels   predniSONE (DELTASONE) 5 mg tablet   No No   Sig: Take 1 Tab by mouth daily. Facility-Administered Medications: None       Objective:     Patient Vitals for the past 24 hrs:   Temp Pulse Resp BP SpO2   03/16/21 2032 97.7 °F (36.5 °C) 86 18 (!) 161/91 98 %   03/16/21 1701 -- 75 23 136/66 99 %   03/16/21 1605 98.1 °F (36.7 °C) 92 18 (!) 161/82 98 %     Oxygen Therapy  O2 Sat (%): 98 % (03/16/21 2032)  Pulse via Oximetry: 78 beats per minute (03/16/21 1701)  O2 Device: Room air (03/16/21 1605)  No intake or output data in the 24 hours ending 03/16/21 2112    Physical Exam:  General:    Well nourished. Alert. Mild resp distress at rest  Eyes:   Normal sclera. Extraocular movements intact. ENT:  Normocephalic, atraumatic. Moist mucous membranes  CV:   Irregular No murmur, rub, or gallop. Lungs:  Coarse breath sounds rt>left  Abdomen: Soft, nontender, nondistended. Bowel sounds normal.   Extremities: Warm and dry. No cyanosis or edema. Neurologic: CN II-XII grossly intact. Sensation intact. Skin:     No rashes or jaundice. Psych:  Normal mood and affect. I reviewed the labs, imaging, EKGs, telemetry, and other studies done this admission. Data Review:   Recent Results (from the past 24 hour(s))   CBC WITH AUTOMATED DIFF    Collection Time: 03/16/21  4:08 PM   Result Value Ref Range    WBC 7.4 4.3 - 11.1 K/uL    RBC 3.46 (L) 4.05 - 5.2 M/uL    HGB 6.8 (LL) 11.7 - 15.4 g/dL    HCT 24.6 (L) 35.8 - 46.3 %    MCV 71.1 (L) 79.6 - 97.8 FL    MCH 19.7 (L) 26.1 - 32.9 PG    MCHC 27.6 (L) 31.4 - 35.0 g/dL    RDW 17.2 (H) 11.9 - 14.6 %    PLATELET 137 207 - 863 K/uL    MPV 10.5 9.4 - 12.3 FL    ABSOLUTE NRBC 0.02 0.0 - 0.2 K/uL    DF AUTOMATED      NEUTROPHILS 82 (H) 43 - 78 %    LYMPHOCYTES 12 (L) 13 - 44 %    MONOCYTES 5 4.0 - 12.0 %    EOSINOPHILS 0 (L) 0.5 - 7.8 %    BASOPHILS 0 0.0 - 2.0 %    IMMATURE GRANULOCYTES 0 0.0 - 5.0 %    ABS. NEUTROPHILS 6.0 1.7 - 8.2 K/UL    ABS. LYMPHOCYTES 0.9 0.5 - 4.6 K/UL    ABS. MONOCYTES 0.4 0.1 - 1.3 K/UL    ABS. EOSINOPHILS 0.0 0.0 - 0.8 K/UL    ABS. BASOPHILS 0.0 0.0 - 0.2 K/UL    ABS. IMM. GRANS. 0.0 0.0 - 0.5 K/UL   METABOLIC PANEL, COMPREHENSIVE    Collection Time: 03/16/21  4:08 PM   Result Value Ref Range    Sodium 140 136 - 145 mmol/L    Potassium 4.6 3.5 - 5.1 mmol/L    Chloride 109 (H) 98 - 107 mmol/L    CO2 23 21 - 32 mmol/L    Anion gap 8 7 - 16 mmol/L    Glucose 128 (H) 65 - 100 mg/dL    BUN 38 (H) 8 - 23 MG/DL    Creatinine 1.33 (H) 0.6 - 1.0 MG/DL    GFR est AA 47 (L) >60 ml/min/1.73m2    GFR est non-AA 39 (L) >60 ml/min/1.73m2    Calcium 8.9 8.3 - 10.4 MG/DL    Bilirubin, total 0.4 0.2 - 1.1 MG/DL    ALT (SGPT) 133 (H) 12 - 65 U/L    AST (SGOT) 75 (H) 15 - 37 U/L    Alk.  phosphatase 89 50 - 136 U/L    Protein, total 6.8 6.3 - 8.2 g/dL    Albumin 3.4 3.2 - 4.6 g/dL    Globulin 3.4 2.3 - 3.5 g/dL    A-G Ratio 1.0 (L) 1.2 - 3.5     TYPE & SCREEN    Collection Time: 03/16/21  4:08 PM   Result Value Ref Range    Crossmatch Expiration 03/19/2021,2359     ABO/Rh(D) A POSITIVE     Antibody screen NEG     Comment BLOOD READY, CALLED ER AT 1849 3.16.21     Unit number [id number]     Blood component type RC LR     Unit division 00     Status of unit ISSUED     Crossmatch result Compatible    NT-PRO BNP    Collection Time: 03/16/21  4:08 PM   Result Value Ref Range    NT pro-BNP 1,951 (H) <450 PG/ML   EKG, 12 LEAD, INITIAL    Collection Time: 03/16/21  4:38 PM   Result Value Ref Range    Ventricular Rate 81 BPM    Atrial Rate 138 BPM    QRS Duration 82 ms    Q-T Interval 386 ms    QTC Calculation (Bezet) 448 ms    Calculated R Axis 40 degrees    Calculated T Axis 32 degrees    Diagnosis       !! AGE AND GENDER SPECIFIC ECG ANALYSIS !! Atrial fibrillation  Abnormal ECG  When compared with ECG of 19-FEB-2019 10:54,  Atrial fibrillation has replaced Sinus rhythm  Nonspecific T wave abnormality now evident in Anterior leads     RBC, ALLOCATE    Collection Time: 03/16/21  5:15 PM   Result Value Ref Range    HISTORY CHECKED? Historical check performed        All Micro Results     None          Other Studies:  Xr Chest Port    Result Date: 3/16/2021  Chest X-ray INDICATION: Shortness of breath A portable AP view of the chest was obtained. FINDINGS: There is a new right pleural effusion. There is stable cardiomegaly. The bony thorax is intact.       New small right pleural effusion       Assessment and Plan:     Hospital Problems as of 3/16/2021 Date Reviewed: 3/15/2021          Codes Class Noted - Resolved POA    Dyspnea ICD-10-CM: R06.00  ICD-9-CM: 786.09  3/16/2021 - Present Unknown        Pleural effusion ICD-10-CM: J90  ICD-9-CM: 511.9  3/16/2021 - Present Unknown        * (Principal) Anemia, blood loss ICD-10-CM: D50.0  ICD-9-CM: 280.0  3/16/2021 - Present Unknown        H/O: CVA (cerebrovascular accident) ICD-10-CM: Z86.73  ICD-9-CM: V12.54  3/16/2021 - Present Unknown        GERD (gastroesophageal reflux disease) ICD-10-CM: K21.9  ICD-9-CM: 530.81  3/16/2021 - Present Unknown        Stage 3b chronic kidney disease ICD-10-CM: N18.32  ICD-9-CM: 585.3  3/16/2021 - Present Unknown        Diastolic CHF, chronic (HCC) ICD-10-CM: I50.32  ICD-9-CM: 428.32, 428.0  3/16/2021 - Present Unknown        Moderate mitral regurgitation ICD-10-CM: I34.0  ICD-9-CM: 424.0  3/16/2021 - Present Unknown        Moderate tricuspid regurgitation ICD-10-CM: I07.1  ICD-9-CM: 397.0  3/16/2021 - Present Unknown        Atrial fibrillation ([facility] ICD-10-CM: I48.91  ICD-9-CM: 427.31  10/9/2020 - Present Yes        Temporal arteritis ([facility] ICD-10-CM: M31.6  ICD-9-CM: 446.5  5/1/2019 - Present Yes        Acquired hypothyroidism ICD-10-CM: E03.9  ICD-9-CM: 244.9  3/18/2016 - Present Yes                    Signed:  John Hernández MD

## 2021-03-16 NOTE — ED NOTES
Rectal exam performed by Dr. Hector Zhang, supervised by this RN. Pt tolerated well, repositioned in stretcher for pt's comfort. Hemoccult neg. Primary RN made aware.

## 2021-03-16 NOTE — ED PROVIDER NOTES
Patient with a history of atrial fibrillation and previous strokes. Also has temporal arteritis and hypothyroidism. History of GERD. For the past 2 weeks has had increased weakness fatigue and shortness of breath. Went to primary care doctor yesterday who checked some blood work and found patient to be anemic. Called patient today and sent here for further evaluation. Patient states she gets off and on dark stools but not currently dark. Also has off and on epigastric pain but none currently. No nausea or vomiting. No diarrhea or constipation. No dysuria hematuria. The history is provided by the patient. No  was used. Abnormal Lab Results  This is a new problem. The current episode started yesterday. The problem occurs constantly. The problem has not changed since onset. Associated symptoms include abdominal pain and shortness of breath. Pertinent negatives include no chest pain and no headaches. Nothing aggravates the symptoms. Nothing relieves the symptoms. She has tried nothing for the symptoms.         Past Medical History:   Diagnosis Date    Arthritis     Atrial fibrillation ([facility] 10/2020    Cerebral microvascular disease 2019    CT, MRI, Carotid U/S    CVA (cerebral vascular accident) ([facility] 10/6/2020    Dependent edema     GERD (gastroesophageal reflux disease)     Headache     ocular migraines    Inguinal hernia recurrent bilateral     Stasis dermatitis of both legs     Temporal arteritis (HCC)     right temple- empiric tx with steroids    Thyroid disease     hypothyroid    TMJ click 43/3069    bilateral       Past Surgical History:   Procedure Laterality Date    HX COLONOSCOPY      HX ENDOSCOPY      HH, gastritis,GERD    HX GI      Antibiotics cause diarrhea    HX GYN      Csection    HX HEENT      Bilateral Cataract    HX ORTHOPAEDIC      wrist surg for fracture         Family History:   Problem Relation Age of Onset    Stroke Mother        Social History Socioeconomic History    Marital status:      Spouse name: Not on file    Number of children: Not on file    Years of education: Not on file    Highest education level: Not on file   Occupational History    Not on file   Social Needs    Financial resource strain: Not on file    Food insecurity     Worry: Not on file     Inability: Not on file    Transportation needs     Medical: Not on file     Non-medical: Not on file   Tobacco Use    Smoking status: Never Smoker    Smokeless tobacco: Never Used   Substance and Sexual Activity    Alcohol use: No    Drug use: No    Sexual activity: Not on file   Lifestyle    Physical activity     Days per week: Not on file     Minutes per session: Not on file    Stress: Not on file   Relationships    Social connections     Talks on phone: Not on file     Gets together: Not on file     Attends Congregation service: Not on file     Active member of club or organization: Not on file     Attends meetings of clubs or organizations: Not on file     Relationship status: Not on file    Intimate partner violence     Fear of current or ex partner: Not on file     Emotionally abused: Not on file     Physically abused: Not on file     Forced sexual activity: Not on file   Other Topics Concern    Not on file   Social History Narrative    Not on file         ALLERGIES: Amoxicillin, Azithromycin, Ceftriaxone, Cefuroxime, Celecoxib, Dexilant [dexlansoprazole], Keflex [cephalexin], Metronidazole, Pepcid [famotidine], and Sulfamethoxazole-trimethoprim    Review of Systems   Constitutional: Positive for fatigue. Negative for chills and fever. HENT: Negative for rhinorrhea and sore throat. Eyes: Negative for pain and redness. Respiratory: Positive for shortness of breath. Negative for chest tightness and wheezing. Cardiovascular: Negative for chest pain and leg swelling. Gastrointestinal: Positive for abdominal pain.  Negative for blood in stool, diarrhea, nausea and vomiting. Genitourinary: Negative for dysuria and hematuria. Musculoskeletal: Negative for back pain, gait problem, neck pain and neck stiffness. Skin: Positive for pallor. Negative for color change and rash. Neurological: Positive for weakness. Negative for numbness and headaches. Vitals:    03/16/21 1605   BP: (!) 161/82   Pulse: 92   Resp: 18   Temp: 98.1 °F (36.7 °C)   SpO2: 98%   Weight: 45.4 kg (100 lb)   Height: 5' (1.524 m)            Physical Exam  Constitutional:       Appearance: Normal appearance. She is well-developed. HENT:      Head: Normocephalic and atraumatic. Neck:      Musculoskeletal: Normal range of motion and neck supple. Cardiovascular:      Rate and Rhythm: Normal rate and regular rhythm. Pulmonary:      Effort: Respiratory distress (increased effort) present. Breath sounds: Normal breath sounds. No wheezing. Abdominal:      General: Bowel sounds are normal.      Palpations: Abdomen is soft. Tenderness: There is no abdominal tenderness. Genitourinary:     Rectum: Guaiac result negative (brown stool. ). Musculoskeletal: Normal range of motion. Right lower leg: Edema present. Left lower leg: Edema present. Skin:     General: Skin is warm and dry. Coloration: Skin is pale. Neurological:      General: No focal deficit present. Mental Status: She is alert and oriented to person, place, and time. MDM  Number of Diagnoses or Management Options  Diagnosis management comments: Patient heme-negative with anemia down to 6.8. Having fatigue weakness and shortness of breath. Worse with exertion. Will transfuse 1 unit of blood and admit for further treatment and work-up.        Amount and/or Complexity of Data Reviewed  Clinical lab tests: ordered and reviewed  Tests in the medicine section of CPT®: ordered and reviewed    Patient Progress  Patient progress: stable         Procedures        EKG: nonspecific ST and T waves changes, atrial fibrillation, rate 81. Results Include:    Recent Results (from the past 24 hour(s))   CBC WITH AUTOMATED DIFF    Collection Time: 03/16/21  4:08 PM   Result Value Ref Range    WBC 7.4 4.3 - 11.1 K/uL    RBC 3.46 (L) 4.05 - 5.2 M/uL    HGB 6.8 (LL) 11.7 - 15.4 g/dL    HCT 24.6 (L) 35.8 - 46.3 %    MCV 71.1 (L) 79.6 - 97.8 FL    MCH 19.7 (L) 26.1 - 32.9 PG    MCHC 27.6 (L) 31.4 - 35.0 g/dL    RDW 17.2 (H) 11.9 - 14.6 %    PLATELET 254 811 - 666 K/uL    MPV 10.5 9.4 - 12.3 FL    ABSOLUTE NRBC 0.02 0.0 - 0.2 K/uL    DF AUTOMATED      NEUTROPHILS 82 (H) 43 - 78 %    LYMPHOCYTES 12 (L) 13 - 44 %    MONOCYTES 5 4.0 - 12.0 %    EOSINOPHILS 0 (L) 0.5 - 7.8 %    BASOPHILS 0 0.0 - 2.0 %    IMMATURE GRANULOCYTES 0 0.0 - 5.0 %    ABS. NEUTROPHILS 6.0 1.7 - 8.2 K/UL    ABS. LYMPHOCYTES 0.9 0.5 - 4.6 K/UL    ABS. MONOCYTES 0.4 0.1 - 1.3 K/UL    ABS. EOSINOPHILS 0.0 0.0 - 0.8 K/UL    ABS. BASOPHILS 0.0 0.0 - 0.2 K/UL    ABS. IMM. GRANS. 0.0 0.0 - 0.5 K/UL   EKG, 12 LEAD, INITIAL    Collection Time: 03/16/21  4:38 PM   Result Value Ref Range    Ventricular Rate 81 BPM    Atrial Rate 138 BPM    QRS Duration 82 ms    Q-T Interval 386 ms    QTC Calculation (Bezet) 448 ms    Calculated R Axis 40 degrees    Calculated T Axis 32 degrees    Diagnosis       !! AGE AND GENDER SPECIFIC ECG ANALYSIS !!   Atrial fibrillation  Abnormal ECG  When compared with ECG of 19-FEB-2019 10:54,  Atrial fibrillation has replaced Sinus rhythm  Nonspecific T wave abnormality now evident in Anterior leads

## 2021-03-17 ENCOUNTER — APPOINTMENT (OUTPATIENT)
Dept: CT IMAGING | Age: 86
DRG: 812 | End: 2021-03-17
Attending: FAMILY MEDICINE
Payer: MEDICARE

## 2021-03-17 PROBLEM — I46.9 ASYSTOLE (HCC): Status: ACTIVE | Noted: 2021-03-17

## 2021-03-17 PROBLEM — R29.898 WEAKNESS OF LEFT UPPER EXTREMITY: Status: ACTIVE | Noted: 2021-03-17

## 2021-03-17 PROBLEM — R00.1 BRADYCARDIA: Status: ACTIVE | Noted: 2021-03-17

## 2021-03-17 LAB
ABO + RH BLD: NORMAL
ANION GAP SERPL CALC-SCNC: 8 MMOL/L (ref 7–16)
ATRIAL RATE: 76 BPM
BASOPHILS # BLD: 0 K/UL (ref 0–0.2)
BASOPHILS NFR BLD: 0 % (ref 0–2)
BLD PROD TYP BPU: NORMAL
BLOOD BANK CMNT PATIENT-IMP: NORMAL
BLOOD GROUP ANTIBODIES SERPL: NORMAL
BPU ID: NORMAL
BUN SERPL-MCNC: 37 MG/DL (ref 8–23)
CALCIUM SERPL-MCNC: 8.4 MG/DL (ref 8.3–10.4)
CALCULATED R AXIS, ECG10: 11 DEGREES
CALCULATED T AXIS, ECG11: 35 DEGREES
CHLORIDE SERPL-SCNC: 112 MMOL/L (ref 98–107)
CO2 SERPL-SCNC: 22 MMOL/L (ref 21–32)
CREAT SERPL-MCNC: 1.22 MG/DL (ref 0.6–1)
CROSSMATCH RESULT,%XM: NORMAL
DIAGNOSIS, 93000: NORMAL
DIFFERENTIAL METHOD BLD: ABNORMAL
EOSINOPHIL # BLD: 0.1 K/UL (ref 0–0.8)
EOSINOPHIL NFR BLD: 1 % (ref 0.5–7.8)
ERYTHROCYTE [DISTWIDTH] IN BLOOD BY AUTOMATED COUNT: 19.9 % (ref 11.9–14.6)
FERRITIN SERPL-MCNC: 8 NG/ML (ref 8–388)
GLUCOSE BLD STRIP.AUTO-MCNC: 187 MG/DL (ref 65–100)
GLUCOSE SERPL-MCNC: 118 MG/DL (ref 65–100)
HCT VFR BLD AUTO: 27.4 % (ref 35.8–46.3)
HGB BLD-MCNC: 8 G/DL (ref 11.7–15.4)
IMM GRANULOCYTES # BLD AUTO: 0 K/UL (ref 0–0.5)
IMM GRANULOCYTES NFR BLD AUTO: 0 % (ref 0–5)
IRON SATN MFR SERPL: 4 %
IRON SERPL-MCNC: 15 UG/DL (ref 35–150)
LYMPHOCYTES # BLD: 0.8 K/UL (ref 0.5–4.6)
LYMPHOCYTES NFR BLD: 8 % (ref 13–44)
MCH RBC QN AUTO: 21.6 PG (ref 26.1–32.9)
MCHC RBC AUTO-ENTMCNC: 29.2 G/DL (ref 31.4–35)
MCV RBC AUTO: 74.1 FL (ref 79.6–97.8)
MONOCYTES # BLD: 0.9 K/UL (ref 0.1–1.3)
MONOCYTES NFR BLD: 9 % (ref 4–12)
NEUTS SEG # BLD: 8.4 K/UL (ref 1.7–8.2)
NEUTS SEG NFR BLD: 82 % (ref 43–78)
NRBC # BLD: 0 K/UL (ref 0–0.2)
PLATELET # BLD AUTO: 255 K/UL (ref 150–450)
PMV BLD AUTO: 10.9 FL (ref 9.4–12.3)
POTASSIUM SERPL-SCNC: 4.4 MMOL/L (ref 3.5–5.1)
Q-T INTERVAL, ECG07: 454 MS
QRS DURATION, ECG06: 82 MS
QTC CALCULATION (BEZET), ECG08: 464 MS
RBC # BLD AUTO: 3.7 M/UL (ref 4.05–5.2)
SERVICE CMNT-IMP: ABNORMAL
SODIUM SERPL-SCNC: 142 MMOL/L (ref 136–145)
SPECIMEN EXP DATE BLD: NORMAL
STATUS OF UNIT,%ST: NORMAL
TIBC SERPL-MCNC: 420 UG/DL (ref 250–450)
UNIT DIVISION, %UDIV: 0
VENTRICULAR RATE, ECG03: 63 BPM
WBC # BLD AUTO: 10.3 K/UL (ref 4.3–11.1)

## 2021-03-17 PROCEDURE — 82728 ASSAY OF FERRITIN: CPT

## 2021-03-17 PROCEDURE — 83550 IRON BINDING TEST: CPT

## 2021-03-17 PROCEDURE — 82962 GLUCOSE BLOOD TEST: CPT

## 2021-03-17 PROCEDURE — 93306 TTE W/DOPPLER COMPLETE: CPT

## 2021-03-17 PROCEDURE — 36415 COLL VENOUS BLD VENIPUNCTURE: CPT

## 2021-03-17 PROCEDURE — 74011250636 HC RX REV CODE- 250/636: Performed by: FAMILY MEDICINE

## 2021-03-17 PROCEDURE — 99218 HC RM OBSERVATION: CPT

## 2021-03-17 PROCEDURE — B24BZZZ ULTRASONOGRAPHY OF HEART WITH AORTA: ICD-10-PCS | Performed by: INTERNAL MEDICINE

## 2021-03-17 PROCEDURE — 70450 CT HEAD/BRAIN W/O DYE: CPT

## 2021-03-17 PROCEDURE — 97162 PT EVAL MOD COMPLEX 30 MIN: CPT

## 2021-03-17 PROCEDURE — 93005 ELECTROCARDIOGRAM TRACING: CPT | Performed by: FAMILY MEDICINE

## 2021-03-17 PROCEDURE — 96375 TX/PRO/DX INJ NEW DRUG ADDON: CPT

## 2021-03-17 PROCEDURE — 85025 COMPLETE CBC W/AUTO DIFF WBC: CPT

## 2021-03-17 PROCEDURE — 2709999900 HC NON-CHARGEABLE SUPPLY

## 2021-03-17 PROCEDURE — 80048 BASIC METABOLIC PNL TOTAL CA: CPT

## 2021-03-17 PROCEDURE — 77030038269 HC DRN EXT URIN PURWCK BARD -A

## 2021-03-17 PROCEDURE — 97530 THERAPEUTIC ACTIVITIES: CPT

## 2021-03-17 PROCEDURE — 96376 TX/PRO/DX INJ SAME DRUG ADON: CPT

## 2021-03-17 PROCEDURE — 99223 1ST HOSP IP/OBS HIGH 75: CPT | Performed by: INTERNAL MEDICINE

## 2021-03-17 PROCEDURE — 92610 EVALUATE SWALLOWING FUNCTION: CPT

## 2021-03-17 PROCEDURE — 74011250637 HC RX REV CODE- 250/637: Performed by: FAMILY MEDICINE

## 2021-03-17 PROCEDURE — 74011000250 HC RX REV CODE- 250: Performed by: FAMILY MEDICINE

## 2021-03-17 RX ORDER — ONDANSETRON 2 MG/ML
4 INJECTION INTRAMUSCULAR; INTRAVENOUS ONCE
Status: COMPLETED | OUTPATIENT
Start: 2021-03-17 | End: 2021-03-17

## 2021-03-17 RX ADMIN — PANTOPRAZOLE SODIUM 40 MG: 40 TABLET, DELAYED RELEASE ORAL at 16:02

## 2021-03-17 RX ADMIN — Medication 10 ML: at 13:20

## 2021-03-17 RX ADMIN — ONDANSETRON 4 MG: 2 INJECTION INTRAMUSCULAR; INTRAVENOUS at 02:40

## 2021-03-17 RX ADMIN — SUCRALFATE 1 G: 1 TABLET ORAL at 21:26

## 2021-03-17 RX ADMIN — ONDANSETRON 4 MG: 2 INJECTION INTRAMUSCULAR; INTRAVENOUS at 03:00

## 2021-03-17 RX ADMIN — LABETALOL HYDROCHLORIDE 10 MG: 5 INJECTION INTRAVENOUS at 02:10

## 2021-03-17 NOTE — PROGRESS NOTES
RAPID RESPONSE CRITICAL CARE OUTREACH NURSE NOTE      Pt was seen and examined by this Outreach RN following Rapid Response. Pt status/focused assessment upon arrival to Rapid Response as follows:      SITUATION:  Code S called for decreased responsiveness and vision changes in left eye. Upon arrival to the room, pt with slight L sided facial droop and delayed speech. LATEST VITAL SIGNS AND LAB VALUES RECORDED:  MEWS Score: 1 (03/17/21 0448)  Vitals:    03/17/21 0056 03/17/21 0232 03/17/21 0237 03/17/21 0448   BP: (!) 173/80 (!) 139/91 135/75 (!) 143/75   Pulse: 80 73 67 70   Resp: 16 18  18   Temp: 97 °F (36.1 °C) 97.9 °F (36.6 °C)  97.3 °F (36.3 °C)   SpO2: 96% 98%  100%   Weight:       Height:         Recent Labs     03/17/21  0522 03/16/21  1608 03/15/21  1120    140 138   K 4.4 4.6 4.8   * 109* 106   CO2 22 23 19*   AGAP 8 8  --    * 128* 109*   BUN 37* 38* 33   CREA 1.22* 1.33* 1.10*   GFRAA 52* 47* 48*   GFRNA 43* 39* 41*   CA 8.4 8.9 9.1   ALB  --  3.4 3.9   TP  --  6.8 6.3   GLOB  --  3.4  --    AGRAT  --  1.0* 1.6   ALT  --  133* 70*        Recent Labs     03/17/21 0522 03/16/21  1608 03/15/21  1120   WBC 10.3 7.4 8.8   HGB 8.0* 6.8* 6.6*   HCT 27.4* 24.6* 23.4*    300 305          Interventions completed during Rapid Response:  CT head and tele-neurology consult completed. Pt disposition IF not transferring to Critical Care:  Pt to remain in room, VSS. Per pt, symptoms involving vision are resolving. Pt on tele monitoring. Cardiology consulted per Hospitalist.       Post Rapid Response plan of care: Will continue to follow per outreach protocol.

## 2021-03-17 NOTE — H&P
[facility] Cardiology Initial Cardiac Evaluation      Date of  Admission: 3/16/2021  4:03 PM     Primary Care Physician: Dr Dunbar  Primary Cardiologist: None  Referring Physician: Dr Pilar Chavira Physician: Dr Josefina Stevens    CC/Reason for evaluation: asystole after labetalol     HPI:  Miranda Pace is a 80 y.o. female with PMH of atrial fibrillation on ASA only (diagnosed 10/2020), moderate aortic regurgitation, moderate to severe mitral regurgitation, pulmonary HTN (RVSP 50-55mmHg), CVA (10/2020), GERD, CKD, and temporal arteritis who presented to [facility] ED on 3/16 from PCP office with abnormal lab results. Patient reports several month history of worsening shortness on breath on ambulation and generalized weakness. Upon evaluation in ED, patient found to have Hgb of 6.8, BUN/Cr 38/1.38, AST 75, , pBNP 1951. CXR showed small right pleural effusion. EKG showed atrial fibrillation with controlled rate. Patient reported intermittent dark stools. Patient was admitted to hospitalist service for anemia secondary to blood loss. During admission, the patient became asystolic on the cardiac monitor after labetalol administration for about 10-15 seconds, followed by bradycardia in the 20s-30s lasting about a minute. Nursing quickly went to bedside but by the time they arrived, the patient's heart rate was coming up. She was unconscious but came to with vocal stimulus and sternal rub. Afterwards, the patient was noted to have left sided facial droop, difficulty speaking, and difficulty raising her left hand therefore CODE S was initiated. CT head was negative for acute intracranial process. Cardiology was consulted to evaluate brief episode of asystole after labetalol.       Past Medical History:   Diagnosis Date    Arthritis     Atrial fibrillation ([address] 10/2020    Cerebral microvascular disease 2019    CT, MRI, Carotid U/S    CVA (cerebral vascular accident) ([address] 10/6/2020    Dependent edema     GERD (gastroesophageal reflux disease)     Headache     ocular migraines    Inguinal hernia recurrent bilateral     Stasis dermatitis of both legs     Temporal arteritis (HCC)     right temple- empiric tx with steroids    Thyroid disease     hypothyroid    TMJ click [id number]/[id number]    bilateral      Past Surgical History:   Procedure Laterality Date    HX COLONOSCOPY      HX ENDOSCOPY      HH, gastritis,GERD    HX GI      Antibiotics cause diarrhea    HX GYN      Csection    HX HEENT      Bilateral Cataract    HX ORTHOPAEDIC      wrist surg for fracture       Allergies   Allergen Reactions    Amoxicillin Diarrhea    Azithromycin Diarrhea    Ceftriaxone Diarrhea    Cefuroxime Diarrhea    Celecoxib Diarrhea    Dexilant [Dexlansoprazole] Diarrhea    Keflex [Cephalexin] Diarrhea    Metronidazole Diarrhea    Pepcid [Famotidine] Diarrhea    Sulfamethoxazole-Trimethoprim Diarrhea      Social History     Socioeconomic History    Marital status:      Spouse name: Not on file    Number of children: Not on file    Years of education: Not on file    Highest education level: Not on file   Occupational History    Not on file   Social Needs    Financial resource strain: Not on file    Food insecurity     Worry: Not on file     Inability: Not on file    Transportation needs     Medical: Not on file     Non-medical: Not on file   Tobacco Use    Smoking status: Never Smoker    Smokeless tobacco: Never Used   Substance and Sexual Activity    Alcohol use: No    Drug use: No    Sexual activity: Not on file   Lifestyle    Physical activity     Days per week: Not on file     Minutes per session: Not on file    Stress: Not on file   Relationships    Social connections     Talks on phone: Not on file     Gets together: Not on file     Attends Jew service: Not on file     Active member of club or organization: Not on file     Attends meetings of clubs or organizations: Not on file     Relationship status: Not on file    Intimate partner violence     Fear of current or ex partner: Not on file     Emotionally abused: Not on file     Physically abused: Not on file     Forced sexual activity: Not on file   Other Topics Concern    Not on file   Social History Narrative    Not on file     Family History   Problem Relation Age of Onset    Stroke Mother         Current Facility-Administered Medications   Medication Dose Route Frequency    0.9% sodium chloride infusion 250 mL  250 mL IntraVENous PRN    aspirin delayed-release tablet 81 mg  81 mg Oral DAILY    predniSONE (DELTASONE) tablet 5 mg  5 mg Oral DAILY    sodium chloride (NS) flush 5-40 mL  5-40 mL IntraVENous Q8H    sodium chloride (NS) flush 5-40 mL  5-40 mL IntraVENous PRN    acetaminophen (TYLENOL) tablet 650 mg  650 mg Oral Q6H PRN    Or    acetaminophen (TYLENOL) suppository 650 mg  650 mg Rectal Q6H PRN    polyethylene glycol (MIRALAX) packet 17 g  17 g Oral DAILY PRN    promethazine (PHENERGAN) tablet 12.5 mg  12.5 mg Oral Q6H PRN    Or    ondansetron (ZOFRAN) injection 4 mg  4 mg IntraVENous Q6H PRN    pantoprazole (PROTONIX) tablet 40 mg  40 mg Oral ACB&D    sucralfate (CARAFATE) tablet 1 g  1 g Oral AC&HS    levothyroxine (SYNTHROID) tablet 75 mcg  75 mcg Oral Once per day on Sun Mon Wed Fri Sat    [START ON 3/18/2021] levothyroxine (SYNTHROID) tablet 50 mcg  50 mcg Oral EVERY TU & TH     Review of Symptoms:  General: Positive for fatigue and generalized weakness. No fever or chills  Skin: no rashes, lumps, or other skin changes  HEENT: Occasional dizziness. No headache, vision changes, hearing changes, tinnitus, vertigo, sinus pressure/pain, bleeding gums, sore throat, or hoarseness  Neck: no swollen glands, goiter, pain or stiffness  Respiratory: Positive for dyspnea.  No cough, sputum, hemoptysis,  wheezing  Cardiovascular: + as per HPI  Gastrointestinal: Positive for GERD, melena  Urinary: no frequency, urgency , hematuria, burning/pain with urination, recent flank pain, polyuria, nocturia, or difficulty urinating  Peripheral Vascular: no claudication, leg cramps, prior DVTs, swelling of calves, legs, or feet, color change, or swelling with redness or tenderness  Musculoskeletal: no muscle or joint pain/stiffness, joint swelling, erythema of joints, or back pain  Psychiatric: no depression or excessive stress  Neurological: no sensory or motor loss, seizures, syncope, tremors, numbness, no dementia  Hematologic: Positive for anemia  Endocrine: Positive for thyroid problems. Denies diabetes. Subjective:     Physical Exam:    Vitals:    03/17/21 0232 03/17/21 0237 03/17/21 0448 03/17/21 0731   BP: (!) 139/91 135/75 (!) 143/75 (!) 145/75   Pulse: 73 67 70 71   Resp: 18  18 18   Temp: 97.9 °F (36.6 °C)  97.3 °F (36.3 °C) 98.2 °F (36.8 °C)   SpO2: 98%  100% 92%   Weight:       Height:         General: Well Developed, Well Nourished, No Acute Distress  HEENT: pupils equal and round, no abnormalities noted  Neck: supple, no JVD, no carotid bruits  Heart: IRR, murmur  Lungs: Clear throughout auscultation bilaterally without adventitious sounds  Abd: soft, nontender, nondistended, with good bowel sounds  Ext: warm, no edema, calves supple/nontender, pulses 2+ bilaterally  Skin: warm and dry  Psychiatric: Normal mood and affect  Neurologic: Alert and oriented X 3    Cardiographics    Telemetry: AFIB  ECG: atrial fibrillation, rate 81  Echocardiogram: 10/6/2020  LEFT VENTRICLE: Size was normal. Systolic function was normal. Ejection fraction was estimated to be greater than 60 %. There were no regional wall motion abnormalities.  Wall thickness was normal. Left ventricular diastolic dysfunction E/e' 13.6     RIGHT VENTRICLE: The size was normal. Systolic function was normal. Estimated peak pressure was in the range of 50-55 mmHg.     LEFT ATRIUM: The atrium was markedly dilated.     ATRIAL SEPTUM: There was no left-to-right shunt and no right-to-left shunt.     RIGHT ATRIUM: The atrium was mildly dilated.     SYSTEMIC VEINS: IVC: The inferior vena cava was normal in size. The respirophasic change in diameter was less than 50%.     AORTIC VALVE: The valve was trileaflet. Leaflets exhibited mildly to moderately increased thickness. There was no evidence for stenosis. There was mild to moderate regurgitation. The regurgitant jet was eccentric.     MITRAL VALVE: There was mild to moderate annular calcification. Valve structure  was normal. There was no evidence for stenosis. There was moderate to severe regurgitation.     TRICUSPID VALVE: The valve structure was normal. There was no evidence for stenosis. There was mild regurgitation.     PULMONIC VALVE: The valve structure was normal. There was no evidence for stenosis. There was mild regurgitation.     PERICARDIUM: There was no pericardial effusion.     AORTA: The root exhibited normal size. Labs:   Recent Results (from the past 24 hour(s))   CBC WITH AUTOMATED DIFF    Collection Time: 03/16/21  4:08 PM   Result Value Ref Range    WBC 7.4 4.3 - 11.1 K/uL    RBC 3.46 (L) 4.05 - 5.2 M/uL    HGB 6.8 (LL) 11.7 - 15.4 g/dL    HCT 24.6 (L) 35.8 - 46.3 %    MCV 71.1 (L) 79.6 - 97.8 FL    MCH 19.7 (L) 26.1 - 32.9 PG    MCHC 27.6 (L) 31.4 - 35.0 g/dL    RDW 17.2 (H) 11.9 - 14.6 %    PLATELET 379 273 - 773 K/uL    MPV 10.5 9.4 - 12.3 FL    ABSOLUTE NRBC 0.02 0.0 - 0.2 K/uL    DF AUTOMATED      NEUTROPHILS 82 (H) 43 - 78 %    LYMPHOCYTES 12 (L) 13 - 44 %    MONOCYTES 5 4.0 - 12.0 %    EOSINOPHILS 0 (L) 0.5 - 7.8 %    BASOPHILS 0 0.0 - 2.0 %    IMMATURE GRANULOCYTES 0 0.0 - 5.0 %    ABS. NEUTROPHILS 6.0 1.7 - 8.2 K/UL    ABS. LYMPHOCYTES 0.9 0.5 - 4.6 K/UL    ABS. MONOCYTES 0.4 0.1 - 1.3 K/UL    ABS. EOSINOPHILS 0.0 0.0 - 0.8 K/UL    ABS. BASOPHILS 0.0 0.0 - 0.2 K/UL    ABS. IMM.  GRANS. 0.0 0.0 - 0.5 K/UL   METABOLIC PANEL, COMPREHENSIVE    Collection Time: 03/16/21  4:08 PM   Result Value Ref Range    Sodium 140 136 - 145 mmol/L    Potassium 4.6 3.5 - 5.1 mmol/L    Chloride 109 (H) 98 - 107 mmol/L    CO2 23 21 - 32 mmol/L    Anion gap 8 7 - 16 mmol/L    Glucose 128 (H) 65 - 100 mg/dL    BUN 38 (H) 8 - 23 MG/DL    Creatinine 1.33 (H) 0.6 - 1.0 MG/DL    GFR est AA 47 (L) >60 ml/min/1.73m2    GFR est non-AA 39 (L) >60 ml/min/1.73m2    Calcium 8.9 8.3 - 10.4 MG/DL    Bilirubin, total 0.4 0.2 - 1.1 MG/DL    ALT (SGPT) 133 (H) 12 - 65 U/L    AST (SGOT) 75 (H) 15 - 37 U/L    Alk. phosphatase 89 50 - 136 U/L    Protein, total 6.8 6.3 - 8.2 g/dL    Albumin 3.4 3.2 - 4.6 g/dL    Globulin 3.4 2.3 - 3.5 g/dL    A-G Ratio 1.0 (L) 1.2 - 3.5     TYPE & SCREEN    Collection Time: 03/16/21  4:08 PM   Result Value Ref Range    Crossmatch Expiration 03/19/2021,2359     ABO/Rh(D) A POSITIVE     Antibody screen NEG     Comment BLOOD READY, CALLED ER AT 1849 3.16.21     Unit number [id number]     Blood component type RC LR     Unit division 00     Status of unit TRANSFUSED     Crossmatch result Compatible    NT-PRO BNP    Collection Time: 03/16/21  4:08 PM   Result Value Ref Range    NT pro-BNP 1,951 (H) <450 PG/ML   RETICULOCYTE COUNT    Collection Time: 03/16/21  4:08 PM   Result Value Ref Range    Reticulocyte count 1.5 0.3 - 2.0 %    Absolute Retic Cnt. 0.0532 0.026 - 0.095 M/ul    Immature Retic Fraction 25.7 (H) 3.0 - 15.9 %    Retic Hgb Conc. 18 (L) 29 - 35 pg   EKG, 12 LEAD, INITIAL    Collection Time: 03/16/21  4:38 PM   Result Value Ref Range    Ventricular Rate 81 BPM    Atrial Rate 138 BPM    QRS Duration 82 ms    Q-T Interval 386 ms    QTC Calculation (Bezet) 448 ms    Calculated R Axis 40 degrees    Calculated T Axis 32 degrees    Diagnosis       !! AGE AND GENDER SPECIFIC ECG ANALYSIS !!   Atrial fibrillation  Abnormal ECG  When compared with ECG of 19-FEB-2019 10:54,  Atrial fibrillation has replaced Sinus rhythm  Nonspecific T wave abnormality now evident in Anterior leads  Confirmed by ST GOPAL LACY MD (), TRACY MARI ([id number]) on 3/16/2021 10:26:50 PM     RBC, ALLOCATE    Collection Time: 03/16/21  5:15 PM   Result Value Ref Range    HISTORY CHECKED? Historical check performed    EKG, 12 LEAD, INITIAL    Collection Time: 03/17/21  1:43 AM   Result Value Ref Range    Ventricular Rate 63 BPM    Atrial Rate 76 BPM    QRS Duration 82 ms    Q-T Interval 454 ms    QTC Calculation (Bezet) 464 ms    Calculated R Axis 11 degrees    Calculated T Axis 35 degrees    Diagnosis       Atrial fibrillation  Abnormal ECG  When compared with ECG of 17-MAR-2021 01:39,  Previous ECG has undetermined rhythm, needs review  Confirmed by Fátima Flores MD ()CARL ([id number]) on 3/17/2021 8:52:28 AM     GLUCOSE, POC    Collection Time: 03/17/21  2:32 AM   Result Value Ref Range    Glucose (POC) 187 (H) 65 - 100 mg/dL    Performed by Fabrizio    METABOLIC PANEL, BASIC    Collection Time: 03/17/21  5:22 AM   Result Value Ref Range    Sodium 142 136 - 145 mmol/L    Potassium 4.4 3.5 - 5.1 mmol/L    Chloride 112 (H) 98 - 107 mmol/L    CO2 22 21 - 32 mmol/L    Anion gap 8 7 - 16 mmol/L    Glucose 118 (H) 65 - 100 mg/dL    BUN 37 (H) 8 - 23 MG/DL    Creatinine 1.22 (H) 0.6 - 1.0 MG/DL    GFR est AA 52 (L) >60 ml/min/1.73m2    GFR est non-AA 43 (L) >60 ml/min/1.73m2    Calcium 8.4 8.3 - 10.4 MG/DL   CBC WITH AUTOMATED DIFF    Collection Time: 03/17/21  5:22 AM   Result Value Ref Range    WBC 10.3 4.3 - 11.1 K/uL    RBC 3.70 (L) 4.05 - 5.2 M/uL    HGB 8.0 (L) 11.7 - 15.4 g/dL    HCT 27.4 (L) 35.8 - 46.3 %    MCV 74.1 (L) 79.6 - 97.8 FL    MCH 21.6 (L) 26.1 - 32.9 PG    MCHC 29.2 (L) 31.4 - 35.0 g/dL    RDW 19.9 (H) 11.9 - 14.6 %    PLATELET 256 674 - 975 K/uL    MPV 10.9 9.4 - 12.3 FL    ABSOLUTE NRBC 0.00 0.0 - 0.2 K/uL    DF AUTOMATED      NEUTROPHILS 82 (H) 43 - 78 %    LYMPHOCYTES 8 (L) 13 - 44 %    MONOCYTES 9 4.0 - 12.0 %    EOSINOPHILS 1 0.5 - 7.8 %    BASOPHILS 0 0.0 - 2.0 %    IMMATURE GRANULOCYTES 0 0.0 - 5.0 %    ABS.  NEUTROPHILS 8.4 (H) 1.7 - 8.2 K/UL ABS. LYMPHOCYTES 0.8 0.5 - 4.6 K/UL    ABS. MONOCYTES 0.9 0.1 - 1.3 K/UL    ABS. EOSINOPHILS 0.1 0.0 - 0.8 K/UL    ABS. BASOPHILS 0.0 0.0 - 0.2 K/UL    ABS. IMM. GRANS. 0.0 0.0 - 0.5 K/UL   FERRITIN    Collection Time: 03/17/21  5:22 AM   Result Value Ref Range    Ferritin 8 8 - 388 NG/ML   TRANSFERRIN SATURATION    Collection Time: 03/17/21  5:22 AM   Result Value Ref Range    Iron 15 (L) 35 - 150 ug/dL    TIBC 420 250 - 450 ug/dL    Transferrin Saturation 4 (L) >20 %     Pt has been seen and examined by Dr. Nadege Smith. He agrees with the following assessment and plan. Assessment/Plan:      Principal Problem:    Anemia, blood loss (3/16/2021)- s/p transfusion PRBC, GI consulted, per primary     Active Problems:    Acquired hypothyroidism (3/18/2016)- on synthroid, per primary       Temporal arteritis ([facility] [address] (5/1/2019)- on prednisone, per primary       Atrial fibrillation ([facility] [address] (10/9/2020)- rate controlled, pt reported refused [address] secondary to risk of falls, cont ASA, monitor on telemetry       Dyspnea (3/16/2021)- multifactorial, improved with transfusion, per primary       Pleural effusion (3/16/2021)- per primary       H/O: CVA (cerebrovascular accident) (3/16/2021)      GERD (gastroesophageal reflux disease) (3/16/2021)- on protonix      Stage 3b chronic kidney disease (3/16/2021)- monitor with daily labs       Diastolic CHF, chronic (HCC) (3/16/2021)- stable, monitor       Moderate mitral regurgitation (3/16/2021)- ECHO, monitor       Moderate tricuspid regurgitation (3/16/2021)- ECHO, monitor       Asystole- brief episode occurred after labetalol administration, avoid AV tigre blocking agents, cont to monitor on telemetry, ECHO, no further intervention at this time     HTN- allow permissive hypertension given patients age up to 89 systolic, can use PRN hydralazine       Thank you for requesting cardiac evaluation and allowing us to participate in the care of this patient.  We will continue to follow along with you. Marylou Martínez PA-C  Supervising Physician: Dr Joyce Estrada:    Patient seen and examined by me. Agree with above note by physician extender. Key findings are:  No CP or SOB at rest, chronic FEUNTES but not clinically volume overloaded at present. Pause/asystole last night due to IV labetalol with permanent AF. Asymptomatic during and after event, no melinda this AM and rate controlled at baseline in 70's without rate slowing meds on board. Avoid rate slowing meds to control BP, use IV hydralazine/oral afterload reducers as needed, which may help MR as well. CV- IRRR IRRR with 3/6 apical holosystolic MR murmur, no S3, no JVD at 60 deg  Lungs- Clear bilaterally apically, decreased bibasilar without wheeze or crackles  Abd- soft, nontender, nondistended  Ext- no edema    Plan: As above. Stop all rate slowing meds, recheck CBC and transfuse as needed. Has refused [address] in the past due to high fall/bleed risk. Use afterload reducers for BP issues, MR. Echo surveillance today. We will follow with you.      Panfilo Conteh MD  [address] Cardiology  Pager [phone]

## 2021-03-17 NOTE — PROGRESS NOTES
03/16/21 2008   Dual Skin Pressure Injury Assessment   Dual Skin Pressure Injury Assessment WDL   Second Care Provider (Based on [address] [place] [address]) Dual w/ KHADAR, RN   Skin Integumentary   Skin Integumentary (WDL) WDL    Pressure  Injury Documentation No Pressure Injury Noted-Pressure Ulcer Prevention Initiated   Dual skin assessment w/ KHADAR RN

## 2021-03-17 NOTE — PROGRESS NOTES
Problem: Falls - Risk of  Goal: *Absence of Falls  Description: Document Pedromatthieu Galilea Fall Risk and appropriate interventions in the flowsheet.   Outcome: Progressing Towards Goal  Note: Fall Risk Interventions:

## 2021-03-17 NOTE — PROGRESS NOTES
TRANSFER - IN REPORT:    Verbal report received from [address] RN(name) on Darryl Medina  being received from ED(unit) for routine progression of care      Report consisted of patients Situation, Background, Assessment and   Recommendations(SBAR). Information from the following report(s) SBAR, Kardex, ED Summary and Recent Results was reviewed with the receiving nurse. Opportunity for questions and clarification was provided. Assessment completed upon patients arrival to unit and care assumed.

## 2021-03-17 NOTE — PROGRESS NOTES
[facility] 79 CRITICAL CARE OUTREACH NURSE PROGRESS REPORT      SUBJECTIVE: Called to assess patient secondary to rapid response. MEWS Score: 1 (03/17/21 0731)  Vitals:    03/17/21 0232 03/17/21 0237 03/17/21 0448 03/17/21 0731   BP: (!) 139/91 135/75 (!) 143/75 (!) 145/75   Pulse: 73 67 70 71   Resp: 18  18 18   Temp: 97.9 °F (36.6 °C)  97.3 °F (36.3 °C) 98.2 °F (36.8 °C)   SpO2: 98%  100% 92%   Weight:       Height:          EKG: normal EKG, normal sinus rhythm, unchanged from previous tracings. LAB DATA:    Recent Labs     03/17/21  0522 03/16/21  1608 03/15/21  1120    140 138   K 4.4 4.6 4.8   * 109* 106   CO2 22 23 19*   AGAP 8 8  --    * 128* 109*   BUN 37* 38* 33   CREA 1.22* 1.33* 1.10*   GFRAA 52* 47* 48*   GFRNA 43* 39* 41*   CA 8.4 8.9 9.1   ALB  --  3.4 3.9   TP  --  6.8 6.3   GLOB  --  3.4  --    AGRAT  --  1.0* 1.6   ALT  --  133* 70*        Recent Labs     03/17/21 0522 03/16/21  1608 03/15/21  1120   WBC 10.3 7.4 8.8   HGB 8.0* 6.8* 6.6*   HCT 27.4* 24.6* 23.4*    300 305          OBJECTIVE: On arrival to room, I found patient to be sitting up in bed, no distress noted. Pain Assessment  Pain Intensity 1: 0 (03/17/21 0900)        Patient Stated Pain Goal: 0                                 ASSESSMENT:  Rapid repsonse called during the night for rhythm pause, questionable stroke like symptoms post event. This AM patient is a/ox4 and able to follow all commands without difficulty. Patient on RA, O2sat 100%, HR 70, lungs CTA bilaterally. Denies any pain/SOB at this time. PLAN:  Will continue to follow per outreach program protocol.      Ramez Whyte RN

## 2021-03-17 NOTE — PROGRESS NOTES
[facility] Hospitalist CODE STROKE Team Note    REASON FOR CODE STROKE CALL: Asystole, left facial droop, speech impairment    INTERVAL HISTORY:   Lorena Mustafa is a 8 y.o. year-old female currently hospitalized for acute blood loss anemia related to GI bleed. At around 0230, a CODE STROKE was called. Per nursing report, the patient became asystolic on the cardiac monitor for about 10-15 seconds, followed by bradycardia in the 20s-30s lasting about a minute. Nursing quickly went to bedside but by the time they arrived, the patient's heart rate was coming up. She was unconscious but came to with vocal stimulus and sternal rub. Afterwards, the patient was noted to have left sided facial droop, difficulty speaking, and difficulty raising her left hand. By the time I arrived the bedside, the patient was alert and oriented, reporting significant nausea. She denies any problems with speech or feeling like one side is weaker than the other. ASSESSMENT AND PLAN:  Active Hospital Problems    Diagnosis Date Noted    Acquired hypothyroidism 03/18/2016     Priority: 6 - Six    Dyspnea 03/16/2021    Pleural effusion 03/16/2021    Anemia, blood loss 03/16/2021    H/O: CVA (cerebrovascular accident) 03/16/2021    GERD (gastroesophageal reflux disease) 03/16/2021    Stage 3b chronic kidney disease 05/31/9596    Diastolic CHF, chronic (HCC) 03/16/2021    Moderate mitral regurgitation 03/16/2021    Moderate tricuspid regurgitation 03/16/2021    Atrial fibrillation ([address] 10/09/2020    Temporal arteritis ([facility] [address] 92/95/6584       - Asystolic episode. This occurred within 20 minutes of patient receiving labetalol, so likely is related to this. Has since resolved, pt now no longer bradycardic and alert. Will check EKG, follow closely on cardiac monitor, consult cardiology in AM.     - Hypotensive TIA. Findings on exam show mild LUE weakness and L facial droop in context of short episode of severe bradycardia.  CT head STAT, consult tele-neuro. Pt not a candidate for tPA given GI bleed. - Disposition: Continue to monitor closely on telemetry, follow up studies/consults above    This visit took in excess of 35 minutes of Critical Care time in evaluation and management of a critically ill or injured patient, such that the critical illness or injury acutely impairs one or more organ system` such that there is a high probability of imminent or life-threatening deterioration in the patient's condition needing immediate attention or presence of critical care physician near bedside for the above time. The time listed is exclusive of any procedures which may have been performed. Critical care time includes time spent at bedside performing patient interview and physical exam, time spent researching patient prior to interaction with patient, time spent discussing findings and treatment plan with patient and/or family, time spent discussing patient with consultants and colleagues, time spent reviewing pertinent laboratory and radiographic evaluations, time spent re-evaluating patient, and/or time spent discussing patient with nursing staff. Objective:     Visit Vitals  /75   Pulse 67   Temp 97.9 °F (36.6 °C)   Resp 18   Ht 5' (1.524 m)   Wt 45.4 kg (100 lb)   SpO2 98%   BMI 19.53 kg/m²      Temp (24hrs), Av.9 °F (36.6 °C), Min:97 °F (36.1 °C), Max:98.4 °F (36.9 °C)    Oxygen Therapy  O2 Sat (%): 98 % (21 0232)  Pulse via Oximetry: 78 beats per minute (21 1701)  O2 Device: Room air (21 1605)  Physical Exam:  General:     No acute distress, speaking in full sentences  Eyes:   No palpebral pallor or scleral icterus. ENT:   External auricular and nasal exam within normal limits. Mucous membranes are moist.  Cardiovascular: Regular rate and rhythm, with normal S1 and S2, no JVD. No cyanosis or edema of extremities. Radial and dorsalis pedis pulses present 2+ bilaterally. Capillary refill <2s. Respiratory:    No respiratory distress or accessory muscle use. Clear to auscultation bilaterally without wheezes or crackles. Gastrointestinal:  No active vomiting or retching. Abdomen soft, non-tender, non-distended with normoactive bowel sounds. Skin:      Normal color, texture, and turgor. No rashes, lesions, or jaundice. Neurologic:  Mild L sided facial droop,  No slurring of speech. 5/5 RUE/BLE strength, 4/5 LUE strength. Psychiatric:  Pleasant demeanor, appropriate affect. Alert and oriented x 3      Data Review:   Recent Results (from the past 24 hour(s))   CBC WITH AUTOMATED DIFF    Collection Time: 03/16/21  4:08 PM   Result Value Ref Range    WBC 7.4 4.3 - 11.1 K/uL    RBC 3.46 (L) 4.05 - 5.2 M/uL    HGB 6.8 (LL) 11.7 - 15.4 g/dL    HCT 24.6 (L) 35.8 - 46.3 %    MCV 71.1 (L) 79.6 - 97.8 FL    MCH 19.7 (L) 26.1 - 32.9 PG    MCHC 27.6 (L) 31.4 - 35.0 g/dL    RDW 17.2 (H) 11.9 - 14.6 %    PLATELET 895 639 - 236 K/uL    MPV 10.5 9.4 - 12.3 FL    ABSOLUTE NRBC 0.02 0.0 - 0.2 K/uL    DF AUTOMATED      NEUTROPHILS 82 (H) 43 - 78 %    LYMPHOCYTES 12 (L) 13 - 44 %    MONOCYTES 5 4.0 - 12.0 %    EOSINOPHILS 0 (L) 0.5 - 7.8 %    BASOPHILS 0 0.0 - 2.0 %    IMMATURE GRANULOCYTES 0 0.0 - 5.0 %    ABS. NEUTROPHILS 6.0 1.7 - 8.2 K/UL    ABS. LYMPHOCYTES 0.9 0.5 - 4.6 K/UL    ABS. MONOCYTES 0.4 0.1 - 1.3 K/UL    ABS. EOSINOPHILS 0.0 0.0 - 0.8 K/UL    ABS. BASOPHILS 0.0 0.0 - 0.2 K/UL    ABS. IMM.  GRANS. 0.0 0.0 - 0.5 K/UL   METABOLIC PANEL, COMPREHENSIVE    Collection Time: 03/16/21  4:08 PM   Result Value Ref Range    Sodium 140 136 - 145 mmol/L    Potassium 4.6 3.5 - 5.1 mmol/L    Chloride 109 (H) 98 - 107 mmol/L    CO2 23 21 - 32 mmol/L    Anion gap 8 7 - 16 mmol/L    Glucose 128 (H) 65 - 100 mg/dL    BUN 38 (H) 8 - 23 MG/DL    Creatinine 1.33 (H) 0.6 - 1.0 MG/DL    GFR est AA 47 (L) >60 ml/min/1.73m2    GFR est non-AA 39 (L) >60 ml/min/1.73m2    Calcium 8.9 8.3 - 10.4 MG/DL    Bilirubin, total 0.4 0.2 - 1.1 MG/DL    ALT (SGPT) 133 (H) 12 - 65 U/L    AST (SGOT) 75 (H) 15 - 37 U/L    Alk. phosphatase 89 50 - 136 U/L    Protein, total 6.8 6.3 - 8.2 g/dL    Albumin 3.4 3.2 - 4.6 g/dL    Globulin 3.4 2.3 - 3.5 g/dL    A-G Ratio 1.0 (L) 1.2 - 3.5     TYPE & SCREEN    Collection Time: 03/16/21  4:08 PM   Result Value Ref Range    Crossmatch Expiration 03/19/2021,2359     ABO/Rh(D) A POSITIVE     Antibody screen NEG     Comment BLOOD READY, CALLED ER AT 1849 3.16.21     Unit number [id number]     Blood component type RC LR     Unit division 00     Status of unit ISSUED     Crossmatch result Compatible    NT-PRO BNP    Collection Time: 03/16/21  4:08 PM   Result Value Ref Range    NT pro-BNP 1,951 (H) <450 PG/ML   RETICULOCYTE COUNT    Collection Time: 03/16/21  4:08 PM   Result Value Ref Range    Reticulocyte count 1.5 0.3 - 2.0 %    Absolute Retic Cnt. 0.0532 0.026 - 0.095 M/ul    Immature Retic Fraction 25.7 (H) 3.0 - 15.9 %    Retic Hgb Conc. 18 (L) 29 - 35 pg   EKG, 12 LEAD, INITIAL    Collection Time: 03/16/21  4:38 PM   Result Value Ref Range    Ventricular Rate 81 BPM    Atrial Rate 138 BPM    QRS Duration 82 ms    Q-T Interval 386 ms    QTC Calculation (Bezet) 448 ms    Calculated R Axis 40 degrees    Calculated T Axis 32 degrees    Diagnosis       !! AGE AND GENDER SPECIFIC ECG ANALYSIS !! Atrial fibrillation  Abnormal ECG  When compared with ECG of 19-FEB-2019 10:54,  Atrial fibrillation has replaced Sinus rhythm  Nonspecific T wave abnormality now evident in Anterior leads  Confirmed by ST GOPAL LACY MD (), TRACY MARI ([id number]) on 3/16/2021 10:26:50 PM     RBC, ALLOCATE    Collection Time: 03/16/21  5:15 PM   Result Value Ref Range    HISTORY CHECKED?  Historical check performed    GLUCOSE, POC    Collection Time: 03/17/21  2:32 AM   Result Value Ref Range    Glucose (POC) 187 (H) 65 - 100 mg/dL    Performed by Leni Patel        Imaging Cain Splinter /Studies:  Xr Chest Port    Result Date: 3/16/2021  New small right pleural effusion        Signed By: Sigifredo Luz MD     March 17, 2021

## 2021-03-17 NOTE — PROGRESS NOTES
LTG: Patient will tolerate least restrictive diet without overt signs or symptoms of airway compromise. STG: Patient will tolerate mechanical soft diet and thin liquids via single cup sip without overt signs or symptoms of airway compromise. STG: Patient will participate in modified barium swallow study as clinically indicated. OBSERVATION STATUS  SPEECH LANGUAGE PATHOLOGY: DYSPHAGIA- Initial Assessment    NAME/AGE/GENDER: Barb Saba is a 80 y.o. female  DATE: 3/17/2021  PRIMARY DIAGNOSIS: Anemia, blood loss [D50.0]  Pleural effusion [J90]  Dyspnea [R06.00]      ICD-10: Treatment Diagnosis: R13.12 Dysphagia, Oropharyngeal Phase    RECOMMENDATIONS   DIET:    Mechanical Soft   Thin Liquids via single cup sip    MEDICATIONS: Whole in puree  Crushed in puree     ASPIRATION PRECAUTIONS  · Slow rate of intake  · Small bites/sips  · Upright at 90 degrees during meal     COMPENSATORY STRATEGIES/MODIFICATIONS  · Cup/sip  · Small sips and bites  · No straws  · Reflux precautions     RECOMMENDATIONS for CONTINUED SPEECH THERAPY:   YES: Anticipate need for ongoing speech therapy during this hospitalization and at next level of care. ASSESSMENT   Patient presents with mild oropharyngeal dysphagia. Mildly increased oral phase with chewables and mild oral delay with liquids. Overt s/sx airway compromise, strong cough, with thin liquids by straw. No overt s/sx with single cup sips. Recommend mechanical soft diet and thin liquids via single cup sip. No straws. Will follow up for diet tolerance. EDUCATION:  · Recommendations discussed with Patient  CONTINUATION OF SKILLED SERVICES/MEDICAL NECESSITY:   Patient is expected to demonstrate progress in  swallow strength, swallow timeliness and swallow safety in order to  improve swallow safety, work toward diet advancement and decrease aspiration risk.  Patient continues to require skilled intervention due to dysphagia.    REHABILITATION POTENTIAL FOR STATED GOALS: Excellent    PLAN    FREQUENCY/DURATION: Continue to follow patient 3 times a week for duration of hospital stay to address above goals. - Recommendations for next treatment session: Next treatment session will address diet tolerance and po trials    SUBJECTIVE   Upright in bed. \"I don't know how I feel til I stand up\"     Oxygen Device:n/a  Pain: Pain Scale 1: Numeric (0 - 10)  Pain Intensity 1: 0    History of Present Injury/Illness: Ms. Olegario Macias  has a past medical history of Arthritis, Atrial fibrillation ([facility] [address] (10/2020), Cerebral microvascular disease (2019), CVA (cerebral vascular accident) ([facility] [address] (10/6/2020), Dependent edema, GERD (gastroesophageal reflux disease), Headache, Inguinal hernia recurrent bilateral, Stasis dermatitis of both legs, Temporal arteritis ([facility] [address] Thyroid disease, and TMJ click (98/8630). She also has no past medical history of CAD (coronary artery disease) or Cancer ([facility] [address] . She also  has a past surgical history that includes hx gyn; hx heent; hx orthopaedic; hx endoscopy; hx colonoscopy; and hx gi. PRECAUTIONS/ALLERGIES: Amoxicillin, Azithromycin, Ceftriaxone, Cefuroxime, Celecoxib, Dexilant [dexlansoprazole], Keflex [cephalexin], Metronidazole, Pepcid [famotidine], and Sulfamethoxazole-trimethoprim     Problem List:  (Impairments causing functional limitations):  1. Oropharyngeal dysphagia     Previous Dysphagia: YES epigastric pain/Reflux. \"I have to eat small meals often\"   Seen 10/7/2020 with rec of regular diet and thin liquids. Diet Prior to Evaluation: NPO    Orientation:  Person  Place  Time  Situation    Cognitive-Linguistic Screening:   Alertness  o Alert   Speech Production:   o Fully intelligible   Expressive Language:  o Able to effectively communicate wants and needs and word finding/production errors.  Receptive Language:  o Answers yes/no questions appropriately and Follows commands   Cognition:   o Denies changes. Oriented to situation.    Prior Level of Function: independent but does not drive per patient. Recommendations: Given results of screening, patient appears to be functioning at baseline. However imaging results are still pending. Will follow up for further assessment as indicated by findings. OBJECTIVE   Oral Motor:   · Labial: min left   · Dentition: Intact  · Oral Hygiene: Adequate  · Lingual: No impairment    Dysphagia evaluation: cleared by EDWARDO Gardner to see patient. Patient presented with thin liquid via cup and straw, puree, mixed, and solid consistencies. Audible breathing at baseline (admitted with SOB-reports worsening leading to hospitalization). Appropriate oral prep with all textures with mildly increased time. single swallows upon palpation. Adequate oral clearing. No overt signs or symptoms of airway compromise observed with liquid via cup or solid textures. Overt s/sx airway compromise, cough, with thin liquids via straw. Tool Used: Dysphagia Outcome and Severity Scale (BASILIA)    Score Comments   Normal Diet  [] 7 With no strategies or extra time needed   Functional Swallow  [] 6 May have mild oral or pharyngeal delay   Mild Dysphagia  [] 5 Which may require one diet consistency restricted    Mild-Moderate Dysphagia  [] 4 With 1-2 diet consistencies restricted   Moderate Dysphagia  [] 3 With 2 or more diet consistencies restricted   Moderate-Severe Dysphagia  [] 2 With partial PO strategies (trials with ST only)   Severe Dysphagia  [] 1 With inability to tolerate any PO safely      Score:  Initial: 5 Most Recent: x (Date 03/17/21 )   Interpretation of Tool: The Dysphagia Outcome and Severity Scale (BASILIA) is a simple, easy-to-use, 7-point scale developed to systematically rate the functional severity of dysphagia based on objective assessment and make recommendations for diet level, independence level, and type of nutrition.        Current Medications:   No current facility-administered medications on file prior to encounter. Current Outpatient Medications on File Prior to Encounter   Medication Sig Dispense Refill    famotidine (PEPCID) 20 mg tablet Take 1 Tab by mouth two (2) times a day. Indications: gastroesophageal reflux disease 180 Tab 2    aspirin delayed-release 81 mg tablet Take 1 Tab by mouth daily. 30 Tab 0    levothyroxine (SYNTHROID) 50 mcg tablet Take 1 tablet every Tuesday and Thursday. 24 Tab 5    levothyroxine (SYNTHROID) 75 mcg tablet [id number] tablet by mouth 5 days a week. On Tuesday and Thursday only takes 50mcg tablet. Indications: a condition with low thyroid hormone levels 60 Tab 5    predniSONE (DELTASONE) 5 mg tablet Take 1 Tab by mouth daily.  90 Tab 2        INTERDISCIPLINARY COLLABORATION: RN    After treatment position/precautions:  · Upright in bed  · RN notified    Total Treatment Duration:   Time In: 5544  Time Out: [address], [facility] CHANDANA BRICE, CCC-SLP

## 2021-03-17 NOTE — PROGRESS NOTES
ACUTE PHYSICAL THERAPY GOALS:  (Developed with and agreed upon by patient and/or caregiver.)  STG:  (1.)Ms. Mira Dorman will move from supine to sit and sit to supine , scoot up and down and roll side to side with SUPERVISION within 3 treatment day(s). (2.)Ms. Mira Dorman will transfer from bed to chair and chair to bed with CONTACT GUARD ASSIST using the least restrictive device within 3 treatment day(s). (3.)Ms. Mira Dorman will ambulate with CONTACT GUARD ASSIST for 50 feet with the least restrictive device within 3 treatment day(s). (4.)Ms. Mira Dorman will perform standing static and dynamic balance activities x 15 minutes with CONTACT GUARD ASSIST to improve safety within 3 treatment day(s). (5.)Ms. Mira Dorman will maintain stable vital signs throughout all functional mobility within 3 treatment days. LTG:  (1.)Ms. Mira Dorman will move from supine to sit and sit to supine , scoot up and down and roll side to side in bed with MODIFIED INDEPENDENCE within 7 treatment day(s). (2.)Ms. Mira Dorman will transfer from bed to chair and chair to bed with SUPERVISION using the least restrictive device within 7 treatment day(s). (3.)Ms. Mira Dorman will ambulate with STAND BY ASSIST for 150 feet with the least restrictive device within 7 treatment day(s). (4.)Ms. Mira Dorman will perform standing static and dynamic balance activities x 15 minutes with SUPERVISION to improve safety within 7 treatment day(s). (5.)Ms. Mira Dorman will ambulate and/or perform functional activities for 15 consecutive minutes with stable vital signs and no rests required to improve activity tolerance within 7 treatment days.   ________________________________________________________________________________________________    PHYSICAL THERAPY ASSESSMENT: Initial Assessment, Daily Note and PM PT Treatment Day # 1      Cayetano Blevins is a 80 y.o. female   PRIMARY DIAGNOSIS: Anemia, blood loss  Anemia, blood loss [D50.0]  Pleural effusion [J90]  Dyspnea [R06.00]       Reason for Referral:  Weakness, impaired balance  ICD-10: Treatment Diagnosis: Generalized Muscle Weakness (M62.81)  Difficulty in walking, Not elsewhere classified (R26.2)  OBSERV[place] / [place] / Product Type: Medicare /     ASSESSMENT:     REHAB RECOMMENDATIONS:   Recommendation to date pending progress:  Setting:   Short-term Rehab  Equipment:    To Be Determined     PRIOR LEVEL OF FUNCTION:  (Prior to Hospitalization) INITIAL/CURRENT LEVEL OF FUNCTION:  (Most Recently Demonstrated)   Bed Mobility:   Modified Independent  Sit to Stand:   Modified Independent  Transfers:   Modified Independent  Gait/Mobility:   Modified Independent Bed Mobility:   Standby Assistance  Sit to Stand:   Contact Guard Assistance  Transfers:   Contact Guard Assistance  Gait/Mobility:   Minimal Assistance     ASSESSMENT:  Ms. Teena Robledo presents with decreased strength, decreased activity tolerance and impaired balance which are impacting her ability to perform ambulation, transfers and bed mobility at their baseline level of mobility. She currently requires minimal assist to complete ambulation without an assistive device. May need to use RW at d/c. Patient presents with good rehab potential secondary to previous level of function. She became quite SOB with minimal activity and required cues to decrease speed and for safety. Erendira Short is currently functioning below her baseline and would benefit from skilled PT during acute care stay to maximize safety and independence with functional mobility. SUBJECTIVE:   Ms. Teena Robledo states, \"It's good to get up and see how I'm doing. \"    SOCIAL HISTORY/LIVING ENVIRONMENT: Patient lives in independent living, typically uses a rollator walker for community distances. Ambulates independently within her home, able to complete light housework and perform ADLs. Reports 0 falls. History of CVA in October 2020 and went to rehab following that.      Home Environment: Independent living  One/Two Story Residence: One story  Living Alone: Yes  Support Systems: Child(luma)  OBJECTIVE:     PAIN: VITAL SIGNS: LINES/DRAINS:   Pre Treatment: Pain Screen  Pain Scale 1: Numeric (0 - 10)  Pain Intensity 1: 0  Post Treatment: 0 Vital Signs  O2 Sat (%): 99 %  O2 Device: Nasal cannula  O2 Flow Rate (L/min): 2 l/min   O2 Device: Nasal cannula     GROSS EVALUATION:  BLE Within Functional Limits Abnormal/ Functional Abnormal/ Non-Functional (see comments) Not Tested Comments:   AROM [x] [] [] []    PROM [x] [] [] []    Strength [] [x] [] []    Balance [] [x] [] []    Posture [] [x] [] [] kyphotic   Sensation [x] [] [] []    Coordination [x] [] [] []    Tone [x] [] [] []    Edema [x] [] [] []    Activity Tolerance [] [] [x] [] Quite SOB with activity, difficulty to obtain SpO2.  Required 2 L/min O2.    [] [] [] []      COGNITION/  PERCEPTION: Intact Impaired   (see comments) Comments:   Orientation [x] []    Vision [x] []    Hearing [x] []    Command Following [x] []    Safety Awareness [] [x] Required cues for safety    [] []      MOBILITY: I Mod I S SBA CGA Min Mod Max Total  NT x2 Comments:   Bed Mobility    Rolling [] [] [] [x] [] [] [] [] [] [] []    Supine to Sit [] [] [] [x] [] [] [] [] [] [] []    Scooting [] [] [] [x] [] [] [] [] [] [] []    Sit to Supine [] [] [] [] [] [] [] [] [] [x] []    Transfers    Sit to Stand [] [] [] [] [x] [] [] [] [] [] []    Bed to Chair [] [] [] [] [] [] [] [] [] [] []    Stand to Sit [] [] [] [] [x] [] [] [] [] [] []    I=Independent, Mod I=Modified Independent, S=Supervision, SBA=Standby Assistance, CGA=Contact Guard Assistance,   Min=Minimal Assistance, Mod=Moderate Assistance, Max=Maximal Assistance, Total=Total Assistance, NT=Not Tested  GAIT: I Mod I S SBA CGA Min Mod Max Total  NT x2 Comments:   Level of Assistance [] [] [] [] [] [x] [] [] [] [] []    Distance 10' x 2    DME Handheld assist    Gait Quality Decreased step clearance, increased trunk sway, accelerated gait speed    Weightbearing Status N/A     I=Independent, Mod I=Modified Independent, S=Supervision, SBA=Standby Assistance, CGA=Contact Guard Assistance,   Min=Minimal Assistance, Mod=Moderate Assistance, Max=Maximal Assistance, Total=Total Assistance, NT=Not [address] Form       How much difficulty does the patient currently have. .. Unable A Lot A Little None   1. Turning over in bed (including adjusting bedclothes, sheets and blankets)? [] 1   [] 2   [x] 3   [] 4   2. Sitting down on and standing up from a chair with arms ( e.g., wheelchair, bedside commode, etc.)   [] 1   [] 2   [x] 3   [] 4   3. Moving from lying on back to sitting on the side of the bed? [] 1   [] 2   [x] 3   [] 4   How much help from another person does the patient currently need. .. Total A Lot A Little None   4. Moving to and from a bed to a chair (including a wheelchair)? [] 1   [x] 2   [] 3   [] 4   5. Need to walk in hospital room? [] 1   [x] 2   [] 3   [] 4   6. Climbing 3-5 steps with a railing? [x] 1   [] 2   [] 3   [] 4   © 2007, Trustees of [facility] [employer], under license to [employer]. All rights reserved     Score:  Initial: 14 Most Recent: X (Date: -- )    Interpretation of Tool:  Represents activities that are increasingly more difficult (i.e. Bed mobility, Transfers, Gait). PLAN:   FREQUENCY/DURATION: PT Plan of Care: 3 times/week for duration of hospital stay or until stated goals are met, whichever comes first.    PROBLEM LIST:   (Skilled intervention is medically necessary to address:)  1. Decreased Activity Tolerance  2. Decreased Balance  3. Decreased Gait Ability  4. Decreased Strength   INTERVENTIONS PLANNED:   (Benefits and precautions of physical therapy have been discussed with the patient.)  1. Therapeutic Activity  2. Therapeutic Exercise/HEP  3. Neuromuscular Re-education  4.  Gait Training  5. Manual Therapy  6. Education     TREATMENT:     EVALUATION: Moderate Complexity : (Untimed Charge)    TREATMENT:   (     )  Therapeutic Activity (23 Minutes): Therapeutic activity included Supine to Sit, Scooting, Transfer Training, Ambulation on level ground, Sitting balance  and Standing balance to improve functional Mobility, Strength and Activity tolerance.     TREATMENT GRID:  N/A    AFTER TREATMENT POSITION/PRECAUTIONS:  Alarm Activated, Chair, Needs within reach and RN notified    INTERDISCIPLINARY COLLABORATION:  RN/PCT and PT/PTA    TOTAL TREATMENT DURATION:  PT Patient Time In/Time Out  Time In: 1430  Time Out: 30 Avel Santos, PT, DPT

## 2021-03-17 NOTE — PROGRESS NOTES
Problem: Falls - Risk of  Goal: *Absence of Falls  Description: Document Lissa Led Fall Risk and appropriate interventions in the flowsheet.   Outcome: Progressing Towards Goal  Note: Fall Risk Interventions:  Mobility Interventions: Bed/chair exit alarm, Patient to call before getting OOB         Medication Interventions: Bed/chair exit alarm, Patient to call before getting OOB, Teach patient to arise slowly    Elimination Interventions: Call light in reach, Bed/chair exit alarm              Problem: Patient Education: Go to Patient Education Activity  Goal: Patient/Family Education  Outcome: Progressing Towards Goal     Problem: Anemia Care Plan (Adult and Pediatric)  Goal: *Labs within defined limits  Outcome: Progressing Towards Goal

## 2021-03-17 NOTE — PROGRESS NOTES
[facility] Hospitalist Progress Note     Name: Cayetano Blevins   Age: 80 y.o. Sex: female  : 1920    MRN:     [id number]    Admit Date:  3/16/2021    Reason for Admission:  Anemia, blood loss [D50.0]  Pleural effusion [J90]  Dyspnea [R06.00]    Assessment & Plan     Anemia secondary to blood loss   Hb of 6.8 on admission with 1 unit of PRBC transfused yesterday evening. Repeat hemoglobin is 8.0.   - GI consulted  - monitor Hb and transfuse as needed    Asystole/Bradycardia  Noted to have 10-15sec followed by bradycardia. No CPR started. Cardiology consulted. Dc/ all BB  - Echo    LUE weakness//Left facial droop  CT head without any abnormalities. Not a tPA candidate per Tele neuro. Symptoms have fully resolved within less than 30mins.   - MRI  - Echo    Severe GERD  -Anemia Carafate and Protonix. Exertional Dyspnea  Reports on going for months. No SOB at rest  - ECHO. Chronic Diastolic dysfunction   based on echo 2020. No exam findings suggestive of fluid overload    CKD 3: appears stable. Continue to monitor    Hypothyroidism : continue with home medications    History of A. Fib: as per previous discharge summary patient decided on only aspirin. Temporal arteritis: continue prednisone    Diet:  DIET MECHANICAL SOFT  DVT PPx: SCD  GI Ppx: PPI   Code: Full Code    Dispo / Discharge Planning: PT recommends [facility]  Course/Subjective:     Please refer to the admission H&P for details of presentation. In summary, Cayetano Blevins is a 80 y.o. female with medical history significant for  Hypothyroidism, prior stroke in , atrial fibrillation only on aspirin, temporal arteritis on prednisone, GERD, CKD stage III who presented to the ED with acute blood loss anemia. Subjective/24 hr Events (21) : Patient is seen and examined at bedside. Overnight, patient was noted to have pause/asystole for about 10-15secs followed by bradycardia in 20-30s.  Patient was also noted to have left sided facial droop, difficulty speaking, and difficulty raising her left hand by nursing staff. When she was evaluated by Nocturnist, her symptoms have resolved. No acute events reported overnight by nursing staff. Reports SOB on exertion but not at rest. Reports that has been on going for sometime now. Denies orthopnea. Patient denies fever, chills, chest pains, shortness of breath at rest, n/v, abdominal pain. Review of Systems: 14 point review of systems is otherwise negative with the exception of the elements mentioned above. Objective:     Patient Vitals for the past 24 hrs:   Temp Pulse Resp BP SpO2   03/17/21 1523 97.4 °F (36.3 °C) 64 18 123/65 100 %   03/17/21 1430 -- -- -- -- 99 %   03/17/21 1122 97.7 °F (36.5 °C) 83 18 (!) 164/78 99 %   03/17/21 0731 98.2 °F (36.8 °C) 71 18 (!) 145/75 92 %   03/17/21 0448 97.3 °F (36.3 °C) 70 18 (!) 143/75 100 %   03/17/21 0237 -- 67 -- 135/75 --   03/17/21 0232 97.9 °F (36.6 °C) 73 18 (!) 139/91 98 %   03/17/21 0056 97 °F (36.1 °C) 80 16 (!) 173/80 96 %   03/17/21 0036 -- 94 -- -- --   03/17/21 0035 -- (!) 41 -- -- --   03/16/21 2310 98.3 °F (36.8 °C) 78 16 (!) 163/76 --   03/16/21 2210 98.4 °F (36.9 °C) 81 18 (!) 155/77 --   03/16/21 2110 98.2 °F (36.8 °C) 75 18 (!) 151/70 --   03/16/21 2055 98 °F (36.7 °C) 78 18 (!) 144/72 --   03/16/21 2032 97.7 °F (36.5 °C) 86 18 (!) 161/91 98 %   03/16/21 1941 97.7 °F (36.5 °C) 83 20 (!) 160/77 95 %     Oxygen Therapy  O2 Sat (%): 100 % (03/17/21 1523)  Pulse via Oximetry: 78 beats per minute (03/16/21 1701)  O2 Device: Nasal cannula (03/17/21 1430)  O2 Flow Rate (L/min): 2 l/min (03/17/21 1430)    Body mass index is 19.53 kg/m². Physical Exam:   General:     alert, awake, no acute distress. Well nourished / malnourished. Obese/underweight/cachetic/malnourished  Head:   normocephalic, atraumatic  Eyes, Ears, nose: PERRL, EOMI. Normal conjunctiva  Neck:    supple, non-tender. Trachea midline. Lungs:   CTAB, no wheezing, rhonchi, rales  Cardiac:   RRR, Normal S1 and S2. No S3, S4 or murmurs. Abdomen:   Soft, non distended, nontender, +BS, no guarding/rebound  Extremities:   Warm, dry. No edema   Skin:   No rashes, no jaundice  Neuro:   AAOx3. No gross focal neurological deficit  Psychiatric:  No anxiety, calm, cooperative    Data Review:  I have reviewed all labs, meds, and studies from the last 24 hours:    Labs:    Recent Results (from the past 24 hour(s))   RBC, ALLOCATE    Collection Time: 03/16/21  5:15 PM   Result Value Ref Range    HISTORY CHECKED?  Historical check performed    EKG, 12 LEAD, INITIAL    Collection Time: 03/17/21  1:43 AM   Result Value Ref Range    Ventricular Rate 63 BPM    Atrial Rate 76 BPM    QRS Duration 82 ms    Q-T Interval 454 ms    QTC Calculation (Bezet) 464 ms    Calculated R Axis 11 degrees    Calculated T Axis 35 degrees    Diagnosis       Atrial fibrillation  Abnormal ECG  When compared with ECG of 17-MAR-2021 01:39,  Previous ECG has undetermined rhythm, needs review  Confirmed by Taylor Bass MD (), CARL VALENTE ([id number]) on 3/17/2021 8:52:28 AM     GLUCOSE, POC    Collection Time: 03/17/21  2:32 AM   Result Value Ref Range    Glucose (POC) 187 (H) 65 - 100 mg/dL    Performed by Fabrizio    METABOLIC PANEL, BASIC    Collection Time: 03/17/21  5:22 AM   Result Value Ref Range    Sodium 142 136 - 145 mmol/L    Potassium 4.4 3.5 - 5.1 mmol/L    Chloride 112 (H) 98 - 107 mmol/L    CO2 22 21 - 32 mmol/L    Anion gap 8 7 - 16 mmol/L    Glucose 118 (H) 65 - 100 mg/dL    BUN 37 (H) 8 - 23 MG/DL    Creatinine 1.22 (H) 0.6 - 1.0 MG/DL    GFR est AA 52 (L) >60 ml/min/1.73m2    GFR est non-AA 43 (L) >60 ml/min/1.73m2    Calcium 8.4 8.3 - 10.4 MG/DL   CBC WITH AUTOMATED DIFF    Collection Time: 03/17/21  5:22 AM   Result Value Ref Range    WBC 10.3 4.3 - 11.1 K/uL    RBC 3.70 (L) 4.05 - 5.2 M/uL    HGB 8.0 (L) 11.7 - 15.4 g/dL    HCT 27.4 (L) 35.8 - 46.3 %    MCV 74.1 (L) 79.6 - 97.8 FL    MCH 21.6 (L) 26.1 - 32.9 PG    MCHC 29.2 (L) 31.4 - 35.0 g/dL    RDW 19.9 (H) 11.9 - 14.6 %    PLATELET 918 719 - 013 K/uL    MPV 10.9 9.4 - 12.3 FL    ABSOLUTE NRBC 0.00 0.0 - 0.2 K/uL    DF AUTOMATED      NEUTROPHILS 82 (H) 43 - 78 %    LYMPHOCYTES 8 (L) 13 - 44 %    MONOCYTES 9 4.0 - 12.0 %    EOSINOPHILS 1 0.5 - 7.8 %    BASOPHILS 0 0.0 - 2.0 %    IMMATURE GRANULOCYTES 0 0.0 - 5.0 %    ABS. NEUTROPHILS 8.4 (H) 1.7 - 8.2 K/UL    ABS. LYMPHOCYTES 0.8 0.5 - 4.6 K/UL    ABS. MONOCYTES 0.9 0.1 - 1.3 K/UL    ABS. EOSINOPHILS 0.1 0.0 - 0.8 K/UL    ABS. BASOPHILS 0.0 0.0 - 0.2 K/UL    ABS. IMM.  GRANS. 0.0 0.0 - 0.5 K/UL   FERRITIN    Collection Time: 03/17/21  5:22 AM   Result Value Ref Range    Ferritin 8 8 - 388 NG/ML   TRANSFERRIN SATURATION    Collection Time: 03/17/21  5:22 AM   Result Value Ref Range    Iron 15 (L) 35 - 150 ug/dL    TIBC 420 250 - 450 ug/dL    Transferrin Saturation 4 (L) >20 %       All Micro Results     None          EKG Results     Procedure 720 Value Units Date/Time    EKG, 12 LEAD, INITIAL [[id number]] Collected: 03/17/21 0143    Order Status: Completed Updated: 03/17/21 0852     Ventricular Rate 63 BPM      Atrial Rate 76 BPM      QRS Duration 82 ms      Q-T Interval 454 ms      QTC Calculation (Bezet) 464 ms      Calculated R Axis 11 degrees      Calculated T Axis 35 degrees      Diagnosis --     Atrial fibrillation  Abnormal ECG  When compared with ECG of 17-MAR-2021 01:39,  Previous ECG has undetermined rhythm, needs review  Confirmed by Lissa Cage MD (), CARL VALENTE ([id number]) on 3/17/2021 8:52:28 AM      EKG, 12 LEAD, INITIAL [[id number]] Collected: 03/16/21 1638    Order Status: Completed Updated: 03/16/21 2227     Ventricular Rate 81 BPM      Atrial Rate 138 BPM      QRS Duration 82 ms      Q-T Interval 386 ms      QTC Calculation (Bezet) 448 ms      Calculated R Axis 40 degrees      Calculated T Axis 32 degrees      Diagnosis --     !! AGE AND GENDER SPECIFIC ECG ANALYSIS !!  Atrial fibrillation  Abnormal ECG  When compared with ECG of 19-FEB-2019 10:54,  Atrial fibrillation has replaced Sinus rhythm  Nonspecific T wave abnormality now evident in Anterior leads  Confirmed by ST GOPAL LACY MD (), TRACY MARI ([id number]) on 3/16/2021 10:26:50 PM            Other Studies:  Xr Chest Port    Result Date: 3/16/2021  Chest X-ray INDICATION: Shortness of breath A portable AP view of the chest was obtained. FINDINGS: There is a new right pleural effusion. There is stable cardiomegaly. The bony thorax is intact. New small right pleural effusion     Ct Code Neuro Head Wo Contrast    Result Date: 3/17/2021  EXAM: CT CODE NEURO HEAD WO CONTRAST HISTORY: .  TECHNIQUE: Axial images of the brain were performed without the administration of intravenous contrast. Images were obtained axial plane and coronal reformatted images were submitted. CT scan performed using appropriate/available dose optimization/reduction/ALARA techniques. COMPARISON: CT head dated 10/6/2020, MRI head dated 10/7/2020 FINDINGS: There is no evidence of acute intracranial hemorrhage, midline shift, or mass effect. No intra or extra-axial fluid collections are observed. There are moderate chronic appearing microangiopathic changes within the periventricular white matter. No evidence of acute confluent territorial infarction. Ventricles and basal cisterns are patent and symmetric. There is moderate generalized volume loss. Visualized paranasal sinuses and mastoid air cells are without significant fluid. Scalp, soft tissues, and calvarium are within normal limits. 1. No CT evidence of acute intracranial process. 2. Chronic changes as above.           Current Meds:   Current Facility-Administered Medications   Medication Dose Route Frequency    0.9% sodium chloride infusion 250 mL  250 mL IntraVENous PRN    aspirin delayed-release tablet 81 mg  81 mg Oral DAILY    predniSONE (DELTASONE) tablet 5 mg  5 mg Oral DAILY    sodium chloride (NS) flush 5-40 mL  5-40 mL IntraVENous Q8H    sodium chloride (NS) flush 5-40 mL  5-40 mL IntraVENous PRN    acetaminophen (TYLENOL) tablet 650 mg  650 mg Oral Q6H PRN    Or    acetaminophen (TYLENOL) suppository 650 mg  650 mg Rectal Q6H PRN    polyethylene glycol (MIRALAX) packet 17 g  17 g Oral DAILY PRN    promethazine (PHENERGAN) tablet 12.5 mg  12.5 mg Oral Q6H PRN    Or    ondansetron (ZOFRAN) injection 4 mg  4 mg IntraVENous Q6H PRN    pantoprazole (PROTONIX) tablet 40 mg  40 mg Oral ACB&D    sucralfate (CARAFATE) tablet 1 g  1 g Oral AC&HS    levothyroxine (SYNTHROID) tablet 75 mcg  75 mcg Oral Once per day on Sun Mon Wed Fri Sat    [START ON 3/18/2021] levothyroxine (SYNTHROID) tablet 50 mcg  50 mcg Oral EVERY TU & TH       Problem List:  Hospital Problems as of 3/17/2021 Date Reviewed: 3/15/2021          Codes Class Noted - Resolved POA    Bradycardia ICD-10-CM: R00.1  ICD-9-CM: 427.89  3/17/2021 - Present Unknown        Asystole (HCC) ICD-10-CM: I46.9  ICD-9-CM: 427.5  3/17/2021 - Present Unknown        Weakness of left upper extremity ICD-10-CM: R29.898  ICD-9-CM: 729.89  3/17/2021 - Present Unknown        Dyspnea ICD-10-CM: R06.00  ICD-9-CM: 786.09  3/16/2021 - Present Unknown        Pleural effusion ICD-10-CM: J90  ICD-9-CM: 511.9  3/16/2021 - Present Unknown        * (Principal) Anemia, blood loss ICD-10-CM: D50.0  ICD-9-CM: 280.0  3/16/2021 - Present Unknown        H/O: CVA (cerebrovascular accident) ICD-10-CM: Z86.73  ICD-9-CM: V12.54  3/16/2021 - Present Unknown        GERD (gastroesophageal reflux disease) ICD-10-CM: K21.9  ICD-9-CM: 530.81  3/16/2021 - Present Unknown        Stage 3b chronic kidney disease ICD-10-CM: N18.32  ICD-9-CM: 585.3  3/16/2021 - Present Unknown        Diastolic CHF, chronic (HCC) ICD-10-CM: I50.32  ICD-9-CM: 428.32, 428.0  3/16/2021 - Present Unknown        Moderate mitral regurgitation ICD-10-CM: I34.0  ICD-9-CM: 424.0  3/16/2021 - Present Unknown Moderate tricuspid regurgitation ICD-10-CM: I07.1  ICD-9-CM: 397.0  3/16/2021 - Present Unknown        Atrial fibrillation ([facility] ICD-10-CM: I48.91  ICD-9-CM: 427.31  10/9/2020 - Present Yes        Facial droop ICD-10-CM: R29.810  ICD-9-CM: 781.94  10/6/2020 - Present Yes        Temporal arteritis ([facility] ICD-10-CM: M31.6  ICD-9-CM: 446.5  5/1/2019 - Present Yes        Acquired hypothyroidism ICD-10-CM: E03.9  ICD-9-CM: 244.9  3/18/2016 - Present Yes               Part of this note was written by using a voice dictation software and the note has been proof read but may still contain some grammatical/other typographical errors.     Signed By: Julio Aceves MD   [employer] [facility]    March 17, 2021  4:38 PM

## 2021-03-17 NOTE — CONSULTS
[facility] Consult Note       Primary GI Physician:  [place] - Dr. Yanira Kay    Referring Provider:  Dr. Horacio Krishna Date:  3/17/2021    Admit Date:  3/16/2021    Chief Complaint:  Anemia, H/o melena off and on, severe reflux    Subjective:     History of Present Illness:  Patient is a 80 y.o. female with PMH of including but not limited to A fib - on ASA, CVA, GERD, moderate aortic regurgitation, moderate to severe mitral regurgitation, pulmonary HTN (RVSP 50-55mmHg), CKD, temporal arteritis, who is seen in consultation at the request of Dr. Khadijah Hunt for anemia, H/o melena off and on, severe reflux. She was admitted after presenting to the ER with increased SOB and weakness. She was noted to have hgb 6.6, MCV 23.4 and has received blood transfusion with hgb increasing to 8. She continues to have weakness today despite the transfusion. She has a history of black stools off and on and small amount of rectal bleeding with red blood, but has not had any episodes in over a month. She denies any abd pain or nausea, but has a history of reflux with episodes of vomiting off and on, decreased with Mylanta. She had better improvement in the past with Zantac, but since it was recalled, she has not found a better replacement. She possibly tried Protonix in past and had some diarrhea with it. She has more recently tried Pepcid with increased diarrhea with it. She denies any chest pain, chronic cough, or hoarseness. She has noticed food passes more slowly through her esophagus at times, with increased mucus and drainage, but does not become stuck. She had colonoscopy and EGD in the past over 10 yrs ago in [place], [place] with negative exams. During admission, she had an episode of asystole after Labetalol administration for A fib, followed by bradycardia. Code S initiated as she was noted to have left sided facial droop, difficulty speaking, and difficulty raising her left giles.   CT scan was negative for acute intracranial process. Cardiology has evaluated. PMH:  Past Medical History:   Diagnosis Date    Arthritis     Atrial fibrillation ([facility] 10/2020    Cerebral microvascular disease 2019    CT, MRI, Carotid U/S    CVA (cerebral vascular accident) ([facility] 10/6/2020    Dependent edema     GERD (gastroesophageal reflux disease)     Headache     ocular migraines    Inguinal hernia recurrent bilateral     Stasis dermatitis of both legs     Temporal arteritis (HCC)     right temple- empiric tx with steroids    Thyroid disease     hypothyroid    TMJ click 53/1973    bilateral       PSH:  Past Surgical History:   Procedure Laterality Date    HX COLONOSCOPY      HX ENDOSCOPY      HH, gastritis,GERD    HX GI      Antibiotics cause diarrhea    HX GYN      Csection    HX HEENT      Bilateral Cataract    HX ORTHOPAEDIC      wrist surg for fracture       Allergies: Allergies   Allergen Reactions    Amoxicillin Diarrhea    Azithromycin Diarrhea    Ceftriaxone Diarrhea    Cefuroxime Diarrhea    Celecoxib Diarrhea    Dexilant [Dexlansoprazole] Diarrhea    Keflex [Cephalexin] Diarrhea    Metronidazole Diarrhea    Pepcid [Famotidine] Diarrhea    Sulfamethoxazole-Trimethoprim Diarrhea       Home Medications:  Prior to Admission medications    Medication Sig Start Date End Date Taking? Authorizing Provider   famotidine (PEPCID) 20 mg tablet Take 1 Tab by mouth two (2) times a day. Indications: gastroesophageal reflux disease 3/15/21   Griselda Lands, MD   aspirin delayed-release 81 mg tablet Take 1 Tab by mouth daily. 10/7/20   Latanya Labs, DO   levothyroxine (SYNTHROID) 50 mcg tablet Take 1 tablet every Tuesday and Thursday. 6/15/20   Griselda Lands, MD   levothyroxine (SYNTHROID) 75 mcg tablet [id number] tablet by mouth 5 days a week. On Tuesday and Thursday only takes 50mcg tablet.   Indications: a condition with low thyroid hormone levels 6/15/20   Griselda Lands, MD predniSONE (DELTASONE) 5 mg tablet Take 1 Tab by mouth daily. 6/15/20   [facility] Medications:  Current Facility-Administered Medications   Medication Dose Route Frequency    0.9% sodium chloride infusion 250 mL  250 mL IntraVENous PRN    aspirin delayed-release tablet 81 mg  81 mg Oral DAILY    predniSONE (DELTASONE) tablet 5 mg  5 mg Oral DAILY    sodium chloride (NS) flush 5-40 mL  5-40 mL IntraVENous Q8H    sodium chloride (NS) flush 5-40 mL  5-40 mL IntraVENous PRN    acetaminophen (TYLENOL) tablet 650 mg  650 mg Oral Q6H PRN    Or    acetaminophen (TYLENOL) suppository 650 mg  650 mg Rectal Q6H PRN    polyethylene glycol (MIRALAX) packet 17 g  17 g Oral DAILY PRN    promethazine (PHENERGAN) tablet 12.5 mg  12.5 mg Oral Q6H PRN    Or    ondansetron (ZOFRAN) injection 4 mg  4 mg IntraVENous Q6H PRN    pantoprazole (PROTONIX) tablet 40 mg  40 mg Oral ACB&D    sucralfate (CARAFATE) tablet 1 g  1 g Oral AC&HS    levothyroxine (SYNTHROID) tablet 75 mcg  75 mcg Oral Once per day on Sun Mon Wed Fri Sat    [START ON 3/18/2021] levothyroxine (SYNTHROID) tablet 50 mcg  50 mcg Oral EVERY TU & TH       Social History:  Social History     Tobacco Use    Smoking status: Never Smoker    Smokeless tobacco: Never Used   Substance Use Topics    Alcohol use: No       Pt has children. Family History:  Family History   Problem Relation Age of Onset    Stroke Mother      No family history of GI illnesses. Review of Systems:  A detailed 10 system ROS is obtained, with pertinent positives as listed above. All others are negative.     Diet:  Mechanical soft    Objective:     Physical Exam:  Vitals:  Visit Vitals  BP (!) 145/75 (BP 1 Location: Right upper arm, BP Patient Position: At rest;Lying)   Pulse 71   Temp 98.2 °F (36.8 °C)   Resp 18   Ht 5' (1.524 m)   Wt 45.4 kg (100 lb)   SpO2 92%   BMI 19.53 kg/m²     Gen:  Pt is alert, cooperative, no acute distress, lying in bed  Skin: Extremities and face reveal no rashes. HEENT: Sclerae anicteric. Extra-occular muscles are intact. No oral ulcers. No abnormal pigmentation of the lips. The neck is supple. Cardiovascular: Irregular. Respiratory:  Comfortable breathing with no accessory muscle use. Coarse breath sounds anteriorly. GI:  Abdomen nondistended, soft, and nontender. Normal active bowel sounds. No enlargement of the liver or spleen. No masses palpable. Rectal:  Deferred  Musculoskeletal:  No pitting edema of the lower legs. Neurological:  Gross memory appears intact. Patient is alert and oriented. Psychiatric:  Mood appears appropriate with judgement intact. Lymphatic:  No cervical or supraclavicular adenopathy. Laboratory:    Recent Labs     03/17/21  0522 03/16/21  1608 03/15/21  1120   WBC 10.3 7.4 8.8   HGB 8.0* 6.8* 6.6*   HCT 27.4* 24.6* 23.4*    300 305   MCV 74.1* 71.1* 71*    140 138   K 4.4 4.6 4.8   * 109* 106   CO2 22 23 19*   BUN 37* 38* 33   CREA 1.22* 1.33* 1.10*   CA 8.4 8.9 9.1   * 128* 109*   AP  --  89 82   ALT  --  133* 70*   TBILI  --  0.4 0.3   ALB  --  3.4 3.9   TP  --  6.8 6.3      Ref. Range 3/16/2021 16:08 3/17/2021 05:22   NT pro-BNP Latest Ref Range: <450 PG/ML 1,951 (H)    Iron Latest Ref Range: 35 - 150 ug/dL  15 (L)   TIBC Latest Ref Range: 250 - 450 ug/dL  420   Transferrin Saturation Latest Ref Range: >20 %  4 (L)   Ferritin Latest Ref Range: 8 - 388 NG/ML  8      Ref.  Range 3/15/2021 11:20 3/16/2021 16:08   AST Latest Ref Range: 15 - 37 U/L 53 (H) 75 (H)     Assessment:     Principal Problem:    Anemia, blood loss (3/16/2021)    Active Problems:    Acquired hypothyroidism (3/18/2016)      Temporal arteritis (HCC) (5/1/2019)      Atrial fibrillation ([address] (10/9/2020)      Dyspnea (3/16/2021)      Pleural effusion (3/16/2021)      H/O: CVA (cerebrovascular accident) (3/16/2021)      GERD (gastroesophageal reflux disease) (3/16/2021)      Stage 3b chronic kidney disease (2/87/7666)      Diastolic CHF, chronic (HCC) (3/16/2021)      Moderate mitral regurgitation (3/16/2021)      Moderate tricuspid regurgitation (3/16/2021)    79 yo female, now pt of Dr. Geronimo Fernández, with PMH of including but not limited to A fib - on ASA, CVA, GERD, moderate aortic regurgitation, moderate to severe mitral regurgitation, pulmonary HTN (RVSP 50-55mmHg), CKD, temporal arteritis, who is seen in consultation 17 March 2021 at the request of Dr. Colette Rodas for anemia, H/o melena off and on, severe reflux, who was admitted after presenting to the ER with increased SOB and weakness, and noted to have hgb 6.6, MCV 23.4. She has received blood transfusion with hgb increasing to 8. She has hx of black stools and rectal bleeding off and on, but none in over a month. She has had reflux, not resolved with Mylanta. She is on Prednisone for arteritis. She had colonoscopy and EGD in the past over 10 yrs ago in [place], [place] with negative exams. Anemia is likely multifactorial, but could be related to GI blood loss, with inflammation, ulceration, AVMs. During admission, she had an episode of asystole after Labetalol administration for A fib, followed by bradycardia. Code S initiated as she was noted to have left sided facial droop, difficulty speaking, and difficulty raising her left giles. CT scan was negative for acute intracranial process. Cardiology has evaluated. Plan:     - Supportive care. - Discussed EGD evaluation, but she is at significant risk given age, cardiac status. Recommended holding off on EGD at this time and treat empirically with PPI for inflammation, ulceration. She agrees, as well as her son and daughter-in-law at bedside.   - Monitor hgb and transfuse PRN. - Protonix 40 mg po BID.  - Carafate 1 gm po QAC and QHS. - Follow. Patient is seen and examined in collaboration with Dr. Kam Fernandez. Assessment and plan as per Dr. Geronimo Fernández. Linette [facility], CELSO  [facility]    GI--Patient seen and examined.  Agree with above.  Given the patient's age and comorbidities, risk of endoscopy is too high to safely do.  Would treat with PPI empirically and give IV Iron (or transfuse) as needed.  Please call if needed.  Will sign off. Thanks   Jaswinder Guajardo MD

## 2021-03-18 ENCOUNTER — APPOINTMENT (OUTPATIENT)
Dept: MRI IMAGING | Age: 86
DRG: 812 | End: 2021-03-18
Attending: INTERNAL MEDICINE
Payer: MEDICARE

## 2021-03-18 PROBLEM — R06.09 EXERTIONAL DYSPNEA: Status: ACTIVE | Noted: 2021-03-18

## 2021-03-18 PROBLEM — D62 ACUTE BLOOD LOSS ANEMIA: Status: ACTIVE | Noted: 2021-03-18

## 2021-03-18 LAB
ANION GAP SERPL CALC-SCNC: 8 MMOL/L (ref 7–16)
BUN SERPL-MCNC: 34 MG/DL (ref 8–23)
CALCIUM SERPL-MCNC: 8.2 MG/DL (ref 8.3–10.4)
CHLORIDE SERPL-SCNC: 110 MMOL/L (ref 98–107)
CO2 SERPL-SCNC: 25 MMOL/L (ref 21–32)
CREAT SERPL-MCNC: 1.2 MG/DL (ref 0.6–1)
ERYTHROCYTE [DISTWIDTH] IN BLOOD BY AUTOMATED COUNT: 20.4 % (ref 11.9–14.6)
GLUCOSE SERPL-MCNC: 82 MG/DL (ref 65–100)
HCT VFR BLD AUTO: 27.4 % (ref 35.8–46.3)
HGB BLD-MCNC: 8 G/DL (ref 11.7–15.4)
MCH RBC QN AUTO: 21.6 PG (ref 26.1–32.9)
MCHC RBC AUTO-ENTMCNC: 29.2 G/DL (ref 31.4–35)
MCV RBC AUTO: 73.9 FL (ref 79.6–97.8)
NRBC # BLD: 0 K/UL (ref 0–0.2)
PLATELET # BLD AUTO: 242 K/UL (ref 150–450)
PMV BLD AUTO: 10.5 FL (ref 9.4–12.3)
POTASSIUM SERPL-SCNC: 3.9 MMOL/L (ref 3.5–5.1)
RBC # BLD AUTO: 3.71 M/UL (ref 4.05–5.2)
SODIUM SERPL-SCNC: 143 MMOL/L (ref 136–145)
WBC # BLD AUTO: 9.4 K/UL (ref 4.3–11.1)

## 2021-03-18 PROCEDURE — 65660000000 HC RM CCU STEPDOWN

## 2021-03-18 PROCEDURE — 74011250637 HC RX REV CODE- 250/637: Performed by: FAMILY MEDICINE

## 2021-03-18 PROCEDURE — 99231 SBSQ HOSP IP/OBS SF/LOW 25: CPT | Performed by: INTERNAL MEDICINE

## 2021-03-18 PROCEDURE — 92526 ORAL FUNCTION THERAPY: CPT

## 2021-03-18 PROCEDURE — 80048 BASIC METABOLIC PNL TOTAL CA: CPT

## 2021-03-18 PROCEDURE — 94760 N-INVAS EAR/PLS OXIMETRY 1: CPT

## 2021-03-18 PROCEDURE — 77010033678 HC OXYGEN DAILY

## 2021-03-18 PROCEDURE — 99218 HC RM OBSERVATION: CPT

## 2021-03-18 PROCEDURE — 36415 COLL VENOUS BLD VENIPUNCTURE: CPT

## 2021-03-18 PROCEDURE — A9270 NON-COVERED ITEM OR SERVICE: HCPCS | Performed by: FAMILY MEDICINE

## 2021-03-18 PROCEDURE — 85027 COMPLETE CBC AUTOMATED: CPT

## 2021-03-18 PROCEDURE — 74011636637 HC RX REV CODE- 636/637: Performed by: FAMILY MEDICINE

## 2021-03-18 RX ORDER — LANOLIN ALCOHOL/MO/W.PET/CERES
1 CREAM (GRAM) TOPICAL
Status: DISCONTINUED | OUTPATIENT
Start: 2021-03-19 | End: 2021-03-19 | Stop reason: HOSPADM

## 2021-03-18 RX ADMIN — Medication 10 ML: at 21:49

## 2021-03-18 RX ADMIN — PANTOPRAZOLE SODIUM 40 MG: 40 TABLET, DELAYED RELEASE ORAL at 05:13

## 2021-03-18 RX ADMIN — PANTOPRAZOLE SODIUM 40 MG: 40 TABLET, DELAYED RELEASE ORAL at 17:05

## 2021-03-18 RX ADMIN — PREDNISONE 5 MG: 10 TABLET ORAL at 08:11

## 2021-03-18 RX ADMIN — Medication 10 ML: at 05:20

## 2021-03-18 RX ADMIN — Medication 10 ML: at 14:16

## 2021-03-18 RX ADMIN — LEVOTHYROXINE SODIUM 50 MCG: 50 TABLET ORAL at 05:18

## 2021-03-18 RX ADMIN — SUCRALFATE 1 G: 1 TABLET ORAL at 05:13

## 2021-03-18 NOTE — PROGRESS NOTES
Patient refusing MRI at this time. States she wants to focus on the anemia she came in here for. She understands the risks of having strokes and the benefits of the MRI. She still is refusing. MRI and MD made aware. Orders per MD to cancel the MRI.

## 2021-03-18 NOTE — PROGRESS NOTES
[facility] CRITICAL CARE OUTREACH NURSE PROGRESS REPORT      SUBJECTIVE: Called to assess patient secondary to recent rapid response and code SYokasta      MEWS Score: 1 (03/17/21 1523)  Vitals:    03/17/21 1430 03/17/21 1523 03/17/21 2006 03/17/21 2338   BP:  123/65 (!) 156/68 (!) 145/72   Pulse:  64 69 74   Resp:  18 18 18   Temp:  97.4 °F (36.3 °C) 98.2 °F (36.8 °C) 98.2 °F (36.8 °C)   SpO2: 99% 100% 96% 100%   Weight:       Height:           LAB DATA:    Recent Labs     03/17/21  0522 03/16/21  1608 03/15/21  1120    140 138   K 4.4 4.6 4.8   * 109* 106   CO2 22 23 19*   AGAP 8 8  --    * 128* 109*   BUN 37* 38* 33   CREA 1.22* 1.33* 1.10*   GFRAA 52* 47* 48*   GFRNA 43* 39* 41*   CA 8.4 8.9 9.1   ALB  --  3.4 3.9   TP  --  6.8 6.3   GLOB  --  3.4  --    AGRAT  --  1.0* 1.6   ALT  --  133* 70*        Recent Labs     03/17/21 0522 03/16/21  1608 03/15/21  1120   WBC 10.3 7.4 8.8   HGB 8.0* 6.8* 6.6*   HCT 27.4* 24.6* 23.4*    300 305          OBJECTIVE: On arrival to room, I found patient to be sitting in bed resting with no distress. Pain Assessment  Pain Intensity 1: 0 (03/17/21 1430)        Patient Stated Pain Goal: 0                                 ASSESSMENT:  Rapid response called previously for rhythm pause, and questionable stroke like symptoms post event. Upon arrival to room patient is alert and oriented x 4 and follows all commands. PT pupils are equal round and reactive to light. Pt on RA o2 sat 96%. Pt denies any needs at this time. VS, labs, and progress notes reviewed. PLAN:  Will continue to follow per outreach program protocol.      Ly Cortez RN

## 2021-03-18 NOTE — PROGRESS NOTES
CM reviewed chart for potential discharge needs. No consult was received. CM in to see patient who is alert and sitting up in bed. CM wore mask and maintained social distancing. Demographics, PCP and insurance reviewed. Patient lives independently at her own apartment at [place]. Patient states that at baseline she is independent in ADLs as long as she has her walker and only needs assistance with meals which someone brings to her from the facility. Therapy recommendations for STR reviewed and patient is hopeful that she will be able to go home to her apartment versus SNF. She will speak with her son when he arrives and if they agree on rehab she is agreeable to [address] and will need Covid and PPD testing. CM discussed moving forward with these items in order to not delay discharge, however, patient would like to wait until she speaks with son. Patient is agreeable to [facility] PT/OT/RN with [facility], whom she has been previously referred to, however, she would like to speak with her son about this as well. CM notes that patient is currently requiring O2. Patient does not use O2 at baseline and CM discussed O2 qualifier and supplying of O2 if necessary. CM will continue to follow for discharge planning.     Care Management Interventions  PCP Verified by CM: Ander Flores MD)  Transition of Care Consult (CM Consult): Discharge Planning  Physical Therapy Consult: Yes  Occupational Therapy Consult: Yes  Speech Therapy Consult: No  Current Support Network: Own Home, Lives Alone, Other(Lives at [place] [address] at [facility])  Confirm Follow Up Transport: Family  Discharge Location  Discharge Placement: Unable to determine at this time No

## 2021-03-18 NOTE — PROGRESS NOTES
[facility] CARDIOLOGY PROGRESS NOTE           3/18/2021 2:38 PM    Admit Date: 3/16/2021         Subjective: Echo showed preserved function with severe MR and mod/severe TR. She is doing fine. Hemaglobin is stable. We had a long discussion about what she wants to pursue and she is not interested in an extensive work up at age 80. ROS:  Cardiovascular:  As noted above    Objective:      Vitals:    03/18/21 0607 03/18/21 0752 03/18/21 1052 03/18/21 1110   BP:  (!) 124/56  (!) 141/67   Pulse:  98  74   Resp:  18  20   Temp:  97.6 °F (36.4 °C)  97.9 °F (36.6 °C)   SpO2:  96% 96% 98%   Weight: 110 lb (49.9 kg)      Height:             Physical Exam:  General: Well Developed, Well Nourished, No Acute Distress, Alert & Oriented x 3, Appropriate mood  Neck: supple, no JVD  Heart: S1S2 with RRR without murmurs or gallops  Lungs: Clear throughout auscultation bilaterally without adventitious sounds  Abd: soft, nontender, nondistended, with good bowel sounds  Ext: no edema bilaterally  Skin: warm and dry      Data Review:   Recent Labs     03/18/21  0854 03/17/21  0522    142   K 3.9 4.4   BUN 34* 37*   CREA 1.20* 1.22*   GLU 82 118*   WBC 9.4 10.3   HGB 8.0* 8.0*   HCT 27.4* 27.4*    255       No results for input(s): TNIPOC, TROIQ in the last 72 hours.       Assessment/Plan:     Principal Problem:    Anemia, blood loss (3/16/2021)    Active Problems:    Acquired hypothyroidism (3/18/2016)      Temporal arteritis ([address] (5/1/2019)      Facial droop (10/6/2020)      Atrial fibrillation ([address] (10/9/2020)      Dyspnea (3/16/2021)      Pleural effusion (3/16/2021)      H/O: CVA (cerebrovascular accident) (3/16/2021)      GERD (gastroesophageal reflux disease) (3/16/2021)      Stage 3b chronic kidney disease (9/07/1273)      Diastolic CHF, chronic (HCC) (3/16/2021)      Moderate mitral regurgitation (3/16/2021)      Moderate tricuspid regurgitation (3/16/2021)      Bradycardia (3/17/2021) Asystole ([facility] (3/17/2021)      Weakness of left upper extremity (3/17/2021)      Acute blood loss anemia (3/18/2021)      Exertional dyspnea (3/18/2021)    A/P  - no further bradycardia  - severe mitral valve disease not a candidate for any type of intervention due to frailty and age  - blood loss anemia - hemaglobin is stable  - htn controlled  - afib - not an [address] candidate, rate is controlled     Cardiology will sign off, call with questions.       Hesham Garcia MD  3/18/2021 2:38 PM

## 2021-03-18 NOTE — PROGRESS NOTES
LTG: Patient will tolerate least restrictive diet without overt signs or symptoms of airway compromise. MET 3/18  STG: Patient will tolerate mechanical soft diet and thin liquids via single cup sip without overt signs or symptoms of airway compromise. MET 3/18  STG: Patient will participate in modified barium swallow study as clinically indicated. Discontinued 3/18    SPEECH LANGUAGE PATHOLOGY: DYSPHAGIA- Daily Note and Discharge    NAME/AGE/GENDER: Barb Saba is a 80 y.o. female  DATE: 3/18/2021  PRIMARY DIAGNOSIS: Anemia, blood loss [D50.0]  Pleural effusion [J90]  Dyspnea [R06.00]  Acute blood loss anemia [D62]  Exertional dyspnea [R06.00]      ICD-10: Treatment Diagnosis: R13.12 Dysphagia, Oropharyngeal Phase    RECOMMENDATIONS   DIET:    Mechanical Soft   Thin Liquids via single cup sip    MEDICATIONS: Whole in puree  Crushed in puree     ASPIRATION PRECAUTIONS  · Slow rate of intake  · Small bites/sips  · Upright at 90 degrees during meal     COMPENSATORY STRATEGIES/MODIFICATIONS  · Cup/sip; no straws  · Small sips and bites  · Reflux precautions  · Smaller more frequent meals (per patient preference)     RECOMMENDATIONS for CONTINUED SPEECH THERAPY:   No further speech therapy indicated at this time. ASSESSMENT   Patient presents with mild oropharyngeal dysphagia. No overt s/sx airway compromise with straws today, but increased incoordination with larger bolus and breathe/swallow coordination increasing risk of airway compromise. Better control with cup. Recommend mechanical soft diet and thin liquids via single cup sip. No straws. No further dysphagia treatment indicated. EDUCATION:  · Recommendations discussed with Patient  CONTINUATION OF SKILLED SERVICES/MEDICAL NECESSITY:   No additional speech services warranted.    REHABILITATION POTENTIAL FOR STATED GOALS: Excellent    PLAN    FREQUENCY/DURATION: no further treatment indicated as pt on safest, least restrictive diet.    SUBJECTIVE   Upright in bed. Ready to get home. Pt reports she likes the softer food \"that's what I need, thank you\"    Oxygen Device: NC 2L  Pain: Pain Scale 1: Numeric (0 - 10)  Pain Intensity 1: 0    History of Present Injury/Illness: Ms. Deedee Kamara  has a past medical history of Arthritis, Atrial fibrillation ([facility] (10/2020), Cerebral microvascular disease (2019), CVA (cerebral vascular accident) ([address] (10/6/2020), Dependent edema, GERD (gastroesophageal reflux disease), Headache, Inguinal hernia recurrent bilateral, Stasis dermatitis of both legs, Temporal arteritis ([address] Thyroid disease, and TMJ click (39/6760). She also has no past medical history of CAD (coronary artery disease) or Cancer ([facility] . She also  has a past surgical history that includes hx gyn; hx heent; hx orthopaedic; hx endoscopy; hx colonoscopy; and hx gi. PRECAUTIONS/ALLERGIES: Amoxicillin, Azithromycin, Ceftriaxone, Cefuroxime, Celecoxib, Dexilant [dexlansoprazole], Keflex [cephalexin], Metronidazole, Pepcid [famotidine], and Sulfamethoxazole-trimethoprim     Problem List:  (Impairments causing functional limitations):  1. Oropharyngeal dysphagia     Previous Dysphagia: YES epigastric pain/Reflux. \"I have to eat small meals often\"   Seen 10/7/2020 with rec of regular diet and thin liquids. Diet Prior to Evaluation: NPO    Orientation:  Person  Place  Time  Situation    OBJECTIVE   Oral Motor:   · Labial: min left at rest   · Dentition: Intact  · Oral Hygiene: Adequate  · Lingual: No impairment    Dysphagia treatment (Diet tolerance)  Patient reports tolerated meal trays without difficulty. Reports preference for the softer solids. No overt s/sx airway compromise with consecutive sips by cup or straw sips and mixed consistency fruit. Increased incoordination of breathe/swallow and piecemeal swallow with larger volumes volumes of thin by straw increasing risk for airway compromise but no overt s/sx.  Improved breathe/swallow coordination with cup sips. Tolerated mixed fruit with functional prep/clearance taking small bites/sips independently. Patient prefers to have \"small meals often\" due to reflux. Tool Used: Dysphagia Outcome and Severity Scale (BASILIA)    Score Comments   Normal Diet  [] 7 With no strategies or extra time needed   Functional Swallow  [] 6 May have mild oral or pharyngeal delay   Mild Dysphagia  [] 5 Which may require one diet consistency restricted    Mild-Moderate Dysphagia  [] 4 With 1-2 diet consistencies restricted   Moderate Dysphagia  [] 3 With 2 or more diet consistencies restricted   Moderate-Severe Dysphagia  [] 2 With partial PO strategies (trials with ST only)   Severe Dysphagia  [] 1 With inability to tolerate any PO safely      Score:  Initial: 5 Most Recent: 5 (Date 03/18/21 )   Interpretation of Tool: The Dysphagia Outcome and Severity Scale (BASILIA) is a simple, easy-to-use, 7-point scale developed to systematically rate the functional severity of dysphagia based on objective assessment and make recommendations for diet level, independence level, and type of nutrition.        INTERDISCIPLINARY COLLABORATION: RN    After treatment position/precautions:  · Upright in bed  · RN notified   · Call light within reach    Total Treatment Duration:   Time In: 1016  Time Out: MARCOS Marc MEDICO DEL [facility], [address] [place] [address]

## 2021-03-18 NOTE — PROGRESS NOTES
Problem: Falls - Risk of  Goal: *Absence of Falls  Description: Document Teressa Silver Fall Risk and appropriate interventions in the flowsheet.   Outcome: Progressing Towards Goal  Note: Fall Risk Interventions:  Mobility Interventions: Bed/chair exit alarm, Patient to call before getting OOB         Medication Interventions: Bed/chair exit alarm, Patient to call before getting OOB    Elimination Interventions: Bed/chair exit alarm, Call light in reach, Patient to call for help with toileting needs              Problem: Patient Education: Go to Patient Education Activity  Goal: Patient/Family Education  Outcome: Progressing Towards Goal     Problem: Anemia Care Plan (Adult and Pediatric)  Goal: *Labs within defined limits  Outcome: Progressing Towards Goal     Problem: Patient Education: Go to Patient Education Activity  Goal: Patient/Family Education  Outcome: Progressing Towards Goal

## 2021-03-18 NOTE — PROGRESS NOTES
Critical Care Outreach Nurse Progress Report:    Subjective: In to assess pt secondary to f/u code S  MEWS Score: 1 (03/18/21 1446)    Vitals:    03/18/21 0752 03/18/21 1052 03/18/21 1110 03/18/21 1446   BP: (!) 124/56  (!) 141/67 (!) 125/48   Pulse: 98  74 80   Resp: 18 20 18   Temp: 97.6 °F (36.4 °C)  97.9 °F (36.6 °C) 98.2 °F (36.8 °C)   SpO2: 96% 96% 98% 99%   Weight:       Height:            Objective: Pt standing at sink, brushing her teeth (standby assist with CNA). Pain Intensity 1: 0 (03/18/21 1054)        Patient Stated Pain Goal: 0    Assessment: A&Ox4. Afib HR 60 on remote telemetry. VSS. No complaints voiced. Plan: Will follow per outreach protocol.

## 2021-03-18 NOTE — PROGRESS NOTES
[facility] Hospitalist Progress Note     Name: Jose Guadalupe Marquis   Age: 80 y.o. Sex: female  : 1920    MRN:     [id number]    Admit Date:  3/16/2021    Reason for Admission:  Anemia, blood loss [D50.0]  Pleural effusion [J90]  Dyspnea [R06.00]  Acute blood loss anemia [D62]  Exertional dyspnea [R06.00]    Assessment & Plan     Anemia secondary to blood loss and iron deficiency  Hb of 6.8 on admission with 1 unit of PRBC transfused yesterday evening. Repeat hemoglobin is 8.0.   - GI consulted: no plan for intervention at this time. PPI BID and carafate  - monitor Hb and transfuse as needed  - patient does not want IV iron but ok with PO iron supplementation after discussion: start Fe supplement. Asystole/Bradycardia  Noted to have 10-15sec followed by bradycardia. No CPR started. Cardiology consulted. Dc/ all BB  - Echo    LUE weakness//Left facial droop  CT head without any abnormalities. Not a tPA candidate per Tele neuro. Symptoms have fully resolved within less than 30mins.   - MRI  - Echo    Severe GERD  -Anemia Carafate and Protonix. Exertional Dyspnea  Reports on going for months. No SOB at rest  - ECHO. Chronic Diastolic dysfunction   based on echo 2020. No exam findings suggestive of fluid overload    CKD 3: appears stable. Continue to monitor    Hypothyroidism : continue with home medications    History of A. Fib: as per previous discharge summary patient decided on only aspirin. Temporal arteritis: continue prednisone    Diet:  DIET MECHANICAL SOFT  DVT PPx: SCD  GI Ppx: PPI   Code: Full Code    Dispo / Discharge Planning: HHT. Dc tomorrow after next Hb check      Hospital Course/Subjective:     Please refer to the admission H&P for details of presentation.       In summary, Jose Guadalupe Marquis is a 80 y.o. female with medical history significant for  Hypothyroidism, prior stroke in , atrial fibrillation only on aspirin, temporal arteritis on prednisone, GERD, CKD stage III who presented to the ED with acute blood loss anemia. Subjective/24 hr Events (03/18/21) : Patient is seen and examined at bedside. No acute events overnight reported. She reports no change in her symptoms. Reports that she has no shortness of breath at rest, no chest pain. Reports slightly dyspneic on exertion as she walked to the bathroom overnight. No pause/asystole or bradycardia overnight. Patient denies fever, chills, chest pains, shortness of breath at rest, n/v, abdominal pain. Review of Systems: 14 point review of systems is otherwise negative with the exception of the elements mentioned above. Objective:     Patient Vitals for the past 24 hrs:   Temp Pulse Resp BP SpO2   03/18/21 1446 98.2 °F (36.8 °C) 80 18 (!) 125/48 99 %   03/18/21 1110 97.9 °F (36.6 °C) 74 20 (!) 141/67 98 %   03/18/21 1052 -- -- -- -- 96 %   03/18/21 0752 97.6 °F (36.4 °C) 98 18 (!) 124/56 96 %   03/18/21 0451 97.8 °F (36.6 °C) 72 18 129/69 97 %   03/17/21 2338 98.2 °F (36.8 °C) 74 18 (!) 145/72 100 %   03/17/21 2006 98.2 °F (36.8 °C) 69 18 (!) 156/68 96 %     Oxygen Therapy  O2 Sat (%): 99 % (03/18/21 1446)  Pulse via Oximetry: 70 beats per minute (03/18/21 1052)  O2 Device: Nasal cannula (03/18/21 1052)  O2 Flow Rate (L/min): 2 l/min (03/18/21 1052)    Body mass index is 22.22 kg/m². Physical Exam:   General:     alert, awake, no acute distress. Head:   normocephalic, atraumatic  Eyes, Ears, nose: PERRL, EOMI. Normal conjunctiva  Neck:    supple, non-tender. Trachea midline. Lungs:   CTAB, no wheezing, rhonchi, rales  Cardiac:   RRR, Normal S1 and S2. Abdomen:   Soft, non distended, nontender, +BS  Extremities:   Warm, dry. No edema   Skin:   No rashes, no jaundice  Neuro:  AAOx3.  No gross focal neurological deficit  Psychiatric:  No anxiety, calm, cooperative    Data Review:  I have reviewed all labs, meds, and studies from the last 24 hours:    Labs:    Recent Results (from the past 24 hour(s)) CBC W/O DIFF    Collection Time: 03/18/21  8:54 AM   Result Value Ref Range    WBC 9.4 4.3 - 11.1 K/uL    RBC 3.71 (L) 4.05 - 5.2 M/uL    HGB 8.0 (L) 11.7 - 15.4 g/dL    HCT 27.4 (L) 35.8 - 46.3 %    MCV 73.9 (L) 79.6 - 97.8 FL    MCH 21.6 (L) 26.1 - 32.9 PG    MCHC 29.2 (L) 31.4 - 35.0 g/dL    RDW 20.4 (H) 11.9 - 14.6 %    PLATELET 435 728 - 747 K/uL    MPV 10.5 9.4 - 12.3 FL    ABSOLUTE NRBC 0.00 0.0 - 0.2 K/uL   METABOLIC PANEL, BASIC    Collection Time: 03/18/21  8:54 AM   Result Value Ref Range    Sodium 143 136 - 145 mmol/L    Potassium 3.9 3.5 - 5.1 mmol/L    Chloride 110 (H) 98 - 107 mmol/L    CO2 25 21 - 32 mmol/L    Anion gap 8 7 - 16 mmol/L    Glucose 82 65 - 100 mg/dL    BUN 34 (H) 8 - 23 MG/DL    Creatinine 1.20 (H) 0.6 - 1.0 MG/DL    GFR est AA 53 (L) >60 ml/min/1.73m2    GFR est non-AA 44 (L) >60 ml/min/1.73m2    Calcium 8.2 (L) 8.3 - 10.4 MG/DL       All Micro Results     None          EKG Results     Procedure 720 Value Units Date/Time    EKG, 12 LEAD, INITIAL [[id number]] Collected: 03/17/21 0143    Order Status: Completed Updated: 03/17/21 0852     Ventricular Rate 63 BPM      Atrial Rate 76 BPM      QRS Duration 82 ms      Q-T Interval 454 ms      QTC Calculation (Bezet) 464 ms      Calculated R Axis 11 degrees      Calculated T Axis 35 degrees      Diagnosis --     Atrial fibrillation  Abnormal ECG  When compared with ECG of 17-MAR-2021 01:39,  Previous ECG has undetermined rhythm, needs review  Confirmed by Yeyo Richards MD (), CARL VALENTE ([id number]) on 3/17/2021 8:52:28 AM      EKG, 12 LEAD, INITIAL [[id number]] Collected: 03/16/21 1638    Order Status: Completed Updated: 03/16/21 2227     Ventricular Rate 81 BPM      Atrial Rate 138 BPM      QRS Duration 82 ms      Q-T Interval 386 ms      QTC Calculation (Bezet) 448 ms      Calculated R Axis 40 degrees      Calculated T Axis 32 degrees      Diagnosis --     !! AGE AND GENDER SPECIFIC ECG ANALYSIS !!   Atrial fibrillation  Abnormal ECG  When compared with ECG of 19-FEB-2019 10:54,  Atrial fibrillation has replaced Sinus rhythm  Nonspecific T wave abnormality now evident in Anterior leads  Confirmed by ST GOPAL LACY MD (), TRACY MARI ([id number]) on 3/16/2021 10:26:50 PM            Other Studies:  Xr Chest Port    Result Date: 3/16/2021  Chest X-ray INDICATION: Shortness of breath A portable AP view of the chest was obtained. FINDINGS: There is a new right pleural effusion. There is stable cardiomegaly. The bony thorax is intact. New small right pleural effusion     Ct Code Neuro Head Wo Contrast    Result Date: 3/17/2021  EXAM: CT CODE NEURO HEAD WO CONTRAST HISTORY: .  TECHNIQUE: Axial images of the brain were performed without the administration of intravenous contrast. Images were obtained axial plane and coronal reformatted images were submitted. CT scan performed using appropriate/available dose optimization/reduction/ALARA techniques. COMPARISON: CT head dated 10/6/2020, MRI head dated 10/7/2020 FINDINGS: There is no evidence of acute intracranial hemorrhage, midline shift, or mass effect. No intra or extra-axial fluid collections are observed. There are moderate chronic appearing microangiopathic changes within the periventricular white matter. No evidence of acute confluent territorial infarction. Ventricles and basal cisterns are patent and symmetric. There is moderate generalized volume loss. Visualized paranasal sinuses and mastoid air cells are without significant fluid. Scalp, soft tissues, and calvarium are within normal limits. 1. No CT evidence of acute intracranial process. 2. Chronic changes as above.           Current Meds:   Current Facility-Administered Medications   Medication Dose Route Frequency    0.9% sodium chloride infusion 250 mL  250 mL IntraVENous PRN    aspirin delayed-release tablet 81 mg  81 mg Oral DAILY    predniSONE (DELTASONE) tablet 5 mg  5 mg Oral DAILY    sodium chloride (NS) flush 5-40 mL  5-40 mL IntraVENous Q8H  sodium chloride (NS) flush 5-40 mL  5-40 mL IntraVENous PRN    acetaminophen (TYLENOL) tablet 650 mg  650 mg Oral Q6H PRN    Or    acetaminophen (TYLENOL) suppository 650 mg  650 mg Rectal Q6H PRN    polyethylene glycol (MIRALAX) packet 17 g  17 g Oral DAILY PRN    promethazine (PHENERGAN) tablet 12.5 mg  12.5 mg Oral Q6H PRN    Or    ondansetron (ZOFRAN) injection 4 mg  4 mg IntraVENous Q6H PRN    pantoprazole (PROTONIX) tablet 40 mg  40 mg Oral ACB&D    sucralfate (CARAFATE) tablet 1 g  1 g Oral AC&HS    levothyroxine (SYNTHROID) tablet 75 mcg  75 mcg Oral Once per day on Sun Mon Wed Fri Sat    levothyroxine (SYNTHROID) tablet 50 mcg  50 mcg Oral EVERY TU & TH       Problem List:  Hospital Problems as of 3/18/2021 Date Reviewed: 3/15/2021          Codes Class Noted - Resolved POA    Acute blood loss anemia ICD-10-CM: D62  ICD-9-CM: 285.1  3/18/2021 - Present Unknown        Exertional dyspnea ICD-10-CM: R06.00  ICD-9-CM: 786.09  3/18/2021 - Present Unknown        Bradycardia ICD-10-CM: R00.1  ICD-9-CM: 427.89  3/17/2021 - Present Unknown        Asystole (HCC) ICD-10-CM: I46.9  ICD-9-CM: 427.5  3/17/2021 - Present Unknown        Weakness of left upper extremity ICD-10-CM: R29.898  ICD-9-CM: 729.89  3/17/2021 - Present Unknown        Dyspnea ICD-10-CM: R06.00  ICD-9-CM: 786.09  3/16/2021 - Present Unknown        Pleural effusion ICD-10-CM: J90  ICD-9-CM: 511.9  3/16/2021 - Present Unknown        * (Principal) Anemia, blood loss ICD-10-CM: D50.0  ICD-9-CM: 280.0  3/16/2021 - Present Unknown        H/O: CVA (cerebrovascular accident) ICD-10-CM: Z86.73  ICD-9-CM: V12.54  3/16/2021 - Present Unknown        GERD (gastroesophageal reflux disease) ICD-10-CM: K21.9  ICD-9-CM: 530.81  3/16/2021 - Present Unknown        Stage 3b chronic kidney disease ICD-10-CM: N18.32  ICD-9-CM: 585.3  3/16/2021 - Present Unknown        Diastolic CHF, chronic (HCC) ICD-10-CM: I50.32  ICD-9-CM: 428.32, 428.0  3/16/2021 - Present Unknown        Moderate mitral regurgitation ICD-10-CM: I34.0  ICD-9-CM: 424.0  3/16/2021 - Present Unknown        Moderate tricuspid regurgitation ICD-10-CM: I07.1  ICD-9-CM: 397.0  3/16/2021 - Present Unknown        Atrial fibrillation ([facility] ICD-10-CM: I48.91  ICD-9-CM: 427.31  10/9/2020 - Present Yes        Facial droop ICD-10-CM: R29.810  ICD-9-CM: 781.94  10/6/2020 - Present Yes        Temporal arteritis ([facility] ICD-10-CM: M31.6  ICD-9-CM: 446.5  5/1/2019 - Present Yes        Acquired hypothyroidism ICD-10-CM: E03.9  ICD-9-CM: 244.9  3/18/2016 - Present Yes               Part of this note was written by using a voice dictation software and the note has been proof read but may still contain some grammatical/other typographical errors.     Signed By: Shayla Cleary MD   [facility] [facility]    March 18, 2021  4:38 PM

## 2021-03-19 VITALS
DIASTOLIC BLOOD PRESSURE: 70 MMHG | SYSTOLIC BLOOD PRESSURE: 151 MMHG | HEIGHT: 60 IN | OXYGEN SATURATION: 96 % | HEART RATE: 60 BPM | BODY MASS INDEX: 21.55 KG/M2 | TEMPERATURE: 98.1 F | RESPIRATION RATE: 18 BRPM | WEIGHT: 109.8 LBS

## 2021-03-19 LAB — HGB BLD-MCNC: 7.7 G/DL (ref 11.7–15.4)

## 2021-03-19 PROCEDURE — 94761 N-INVAS EAR/PLS OXIMETRY MLT: CPT

## 2021-03-19 PROCEDURE — 36415 COLL VENOUS BLD VENIPUNCTURE: CPT

## 2021-03-19 PROCEDURE — 74011250637 HC RX REV CODE- 250/637: Performed by: FAMILY MEDICINE

## 2021-03-19 PROCEDURE — 85018 HEMOGLOBIN: CPT

## 2021-03-19 PROCEDURE — 74011250637 HC RX REV CODE- 250/637: Performed by: INTERNAL MEDICINE

## 2021-03-19 PROCEDURE — 74011636637 HC RX REV CODE- 636/637: Performed by: FAMILY MEDICINE

## 2021-03-19 PROCEDURE — 97530 THERAPEUTIC ACTIVITIES: CPT

## 2021-03-19 RX ORDER — PANTOPRAZOLE SODIUM 40 MG/1
40 TABLET, DELAYED RELEASE ORAL
Qty: 60 TAB | Refills: 1 | Status: SHIPPED | OUTPATIENT
Start: 2021-03-19 | End: 2021-04-07

## 2021-03-19 RX ORDER — LANOLIN ALCOHOL/MO/W.PET/CERES
325 CREAM (GRAM) TOPICAL
Qty: 30 TAB | Refills: 1 | Status: SHIPPED | OUTPATIENT
Start: 2021-03-20 | End: 2021-04-07

## 2021-03-19 RX ORDER — SUCRALFATE 1 G/1
1 TABLET ORAL
Qty: 60 TAB | Refills: 1 | Status: SHIPPED | OUTPATIENT
Start: 2021-03-19 | End: 2021-04-07

## 2021-03-19 RX ADMIN — LEVOTHYROXINE SODIUM 75 MCG: 0.07 TABLET ORAL at 05:32

## 2021-03-19 RX ADMIN — FERROUS SULFATE TAB 325 MG (65 MG ELEMENTAL FE) 325 MG: 325 (65 FE) TAB at 08:17

## 2021-03-19 RX ADMIN — Medication 10 ML: at 13:25

## 2021-03-19 RX ADMIN — Medication 10 ML: at 05:36

## 2021-03-19 RX ADMIN — PREDNISONE 5 MG: 10 TABLET ORAL at 08:17

## 2021-03-19 RX ADMIN — PANTOPRAZOLE SODIUM 40 MG: 40 TABLET, DELAYED RELEASE ORAL at 05:32

## 2021-03-19 NOTE — PROGRESS NOTES
Patient is being discharged home with son at this time. All belongings taken with patient. IVs removed. All questions answered.

## 2021-03-19 NOTE — PROGRESS NOTES
Date of Outreach Update: Erendira Short was seen and assessed. MEWS Score: 2 (03/18/21 2340)  Vitals:    03/18/21 1446 03/18/21 1938 03/18/21 2340 03/19/21 0101   BP: (!) 125/48 129/79 (!) 152/73    Pulse: 80 73 (!) 104 69   Resp: 18 18 18    Temp: 98.2 °F (36.8 °C) 97.8 °F (36.6 °C) 97.8 °F (36.6 °C)    SpO2: 99% 90% 100%    Weight:       Height:             Pain Assessment  Pain Intensity 1: 0 (03/18/21 1938)        Patient Stated Pain Goal: 0    Previous Outreach assessment has been reviewed. There have been no significant clinical changes since the completion of the last dated Outreach assessment. Will continue to follow up per outreach protocol.     Signed By:   Katherine Leavitt    March 19, 2021 3:55 AM

## 2021-03-19 NOTE — PROGRESS NOTES
Patient has completed outreach program. Please call outreach RN for any further concerns. Thank you.

## 2021-03-19 NOTE — PROGRESS NOTES
Problem: Falls - Risk of  Goal: *Absence of Falls  Description: Document Rico Mclain Fall Risk and appropriate interventions in the flowsheet.   Outcome: Progressing Towards Goal  Note: Fall Risk Interventions:  Mobility Interventions: Bed/chair exit alarm, Communicate number of staff needed for ambulation/transfer, Patient to call before getting OOB         Medication Interventions: Bed/chair exit alarm, Patient to call before getting OOB, Teach patient to arise slowly    Elimination Interventions: Bed/chair exit alarm, Call light in reach, Patient to call for help with toileting needs, Stay With Me (per policy), Toilet paper/wipes in reach, Toileting schedule/hourly rounds              Problem: Anemia Care Plan (Adult and Pediatric)  Goal: *Labs within defined limits  Outcome: Progressing Towards Goal     Problem: Patient Education: Go to Patient Education Activity  Goal: Patient/Family Education  Outcome: Progressing Towards Goal     Problem: Patient Education: Go to Patient Education Activity  Goal: Patient/Family Education  Outcome: Progressing Towards Goal

## 2021-03-19 NOTE — PROGRESS NOTES
CM received call from patient's son regarding discharge plan. CM reviewed latest documentation and respiratory documentation shows;      Room air: SpO2 with O2 and liter flow   Resting SpO2  95%   99% on 2L   Ambulating SpO2  88% 91% on 1L  96 on 2L      CM confirmed with [address] that, per Medicare guidelines, this does not qualify patient for home oxygen. Son is agreeable to patient discharging home with home health, which is what patient would like. CM reviewed therapy recommendations. Patient's son will transport her home. CM order placed for home health PT/OT/RN and sent to [facility] who was previously servicing patient. Admission liaison, Jose Bee, notified. Attending notified who states he will plan for discharge today. Care Management Interventions  PCP Verified by CM: Arlina Severe, MD)  Mode of Transport at Discharge: Other (see comment)(Son)  Transition of Care Consult (CM Consult): [address]: No  Reason Outside [place]: Patient already serviced by other home care/hospice agency  Discharge Durable Medical Equipment: No  Physical Therapy Consult: Yes  Occupational Therapy Consult: No  Speech Therapy Consult: No  Current Support Network: Own Home, Family Lives Nearby([place] at [address])  Confirm Follow Up Transport: Family  The Plan for Transition of Care is Related to the Following Treatment Goals : Patient will return home with home health therapy in order to return to baseline independence in [place].    The Patient and/or Patient Representative was Provided with a Choice of Provider and Agrees with the Discharge Plan?: Yes  Freedom of Choice List was Provided with Basic Dialogue that Supports the Patient's Individualized Plan of Care/Goals, Treatment Preferences and Shares the Quality Data Associated with the Providers?: Yes  Discharge Location  Discharge Placement: Home with home health

## 2021-03-19 NOTE — DISCHARGE INSTRUCTIONS

## 2021-03-19 NOTE — PROGRESS NOTES
ACUTE PHYSICAL THERAPY GOALS:  (Developed with and agreed upon by patient and/or caregiver.)  STG:  (1.)Ms. Elly Ramirez will move from supine to sit and sit to supine , scoot up and down and roll side to side with SUPERVISION within 3 treatment day(s). (2.)Ms. Elly Ramirez will transfer from bed to chair and chair to bed with CONTACT GUARD ASSIST using the least restrictive device within 3 treatment day(s). (3.)Ms. Elly Ramirez will ambulate with CONTACT GUARD ASSIST for 50 feet with the least restrictive device within 3 treatment day(s). (4.)Ms. Elly Ramirez will perform standing static and dynamic balance activities x 15 minutes with CONTACT GUARD ASSIST to improve safety within 3 treatment day(s). (5.)Ms. Elly Ramirez will maintain stable vital signs throughout all functional mobility within 3 treatment days.     LTG:  (1.)Ms. Elly Ramirez will move from supine to sit and sit to supine , scoot up and down and roll side to side in bed with MODIFIED INDEPENDENCE within 7 treatment day(s). (2.)Ms. Elly Ramirez will transfer from bed to chair and chair to bed with SUPERVISION using the least restrictive device within 7 treatment day(s). (3.)Ms. Elly Ramirez will ambulate with STAND BY ASSIST for 150 feet with the least restrictive device within 7 treatment day(s). (4.)Ms. Elly Ramirez will perform standing static and dynamic balance activities x 15 minutes with SUPERVISION to improve safety within 7 treatment day(s). (5.)Ms. Elly Ramirez will ambulate and/or perform functional activities for 15 consecutive minutes with stable vital signs and no rests required to improve activity tolerance within 7 treatment days.     PHYSICAL THERAPY: Daily Note and AM Treatment Day # 2    Beverley Whitt is a 80 y.o. female   PRIMARY DIAGNOSIS: Anemia, blood loss  Anemia, blood loss [D50.0]  Pleural effusion [J90]  Dyspnea [R06.00]  Acute blood loss anemia [D62]  Exertional dyspnea [R06.00]         ASSESSMENT:     REHAB RECOMMENDATIONS: CURRENT LEVEL OF FUNCTION:  (Most Recently Demonstrated)   Recommendation to date pending progress:  Setting:   Short-term Rehab   Wants HHPT  Equipment:    To Be Determined Bed Mobility:   Not tested  Sit to Stand:   Standby Assistance  Transfers:   Contact Guard Assistance  Gait/Mobility:   CGA-min assist     ASSESSMENT:  Ms. Gil Lange is making steady progress towards PT goals as noted by increased gait distance. Patient ambulated 61' without use of rolling walker with several minor LOB needing occasional min assist for balance. Patient ambulated an additional 150' with rolling walker which improved balance. Will continue efforts.      SUBJECTIVE:   Ms. Gil Lange states, \"I have a rollator\"    SOCIAL HISTORY/ LIVING ENVIRONMENT: See initial evaluation  Home Environment: Independent living  One/Two Story Residence: One story  Living Alone: Yes  Support Systems: Child(luma)  OBJECTIVE:     PAIN: VITAL SIGNS: LINES/DRAINS:   Pre Treatment: Pain Screen  Pain Scale 1: FLACC  Pain Intensity 1: 0  Post Treatment: 0   None  O2 Device: Nasal cannula     MOBILITY: I Mod I S SBA CGA Min Mod Max Total  NT x2 Comments:   Bed Mobility    Rolling [] [] [] [] [] [] [] [] [] [] []    Supine to Sit [] [] [] [] [] [] [] [] [] [] []    Scooting [] [] [] [] [] [] [] [] [] [] []    Sit to Supine [] [] [] [] [] [] [] [] [] [] []    Transfers    Sit to Stand [] [] [] [x] [] [] [] [] [] [] []    Bed to Chair [] [] [] [] [x] [] [] [] [] [] []    Stand to Sit [] [] [] [x] [] [] [] [] [] [] []    I=Independent, Mod I=Modified Independent, S=Supervision, SBA=Standby Assistance, CGA=Contact Guard Assistance,   Min=Minimal Assistance, Mod=Moderate Assistance, Max=Maximal Assistance, Total=Total Assistance, NT=Not Tested    BALANCE: Good Fair+ Fair Fair- Poor NT Comments   Sitting Static [x] [] [] [] [] []    Sitting Dynamic [x] [] [] [] [] []              Standing Static [x] [] [] [] [] [] With support   Standing Dynamic [] [x] [] [] [] []      GAIT: I Mod I S SBA CGA Min Mod Max Total NT x2 Comments:   Level of Assistance [] [] [] [] [x] [x] [] [] [] [] []    ALLJXGUY 89' without RW, 150' with RW    DME Rolling Walker and None    Gait Quality Unsteady without use of AD    Weightbearing  Status N/A     I=Independent, Mod I=Modified Independent, S=Supervision, SBA=Standby Assistance, CGA=Contact Guard Assistance,   Min=Minimal Assistance, Mod=Moderate Assistance, Max=Maximal Assistance, Total=Total Assistance, NT=Not Tested    PLAN:   FREQUENCY/DURATION: PT Plan of Care: 3 times/week for duration of hospital stay or until stated goals are met, whichever comes first.  TREATMENT:     TREATMENT:   ($$ Therapeutic Activity: 23-37 mins    )  Therapeutic Activity (30 Minutes): Therapeutic activity included Transfer Training, Ambulation on level ground and Standing balance to improve functional Mobility, Strength and Activity tolerance.     TREATMENT GRID:  N/A    AFTER TREATMENT POSITION/PRECAUTIONS:  Alarm Activated, Chair, Needs within reach and RN notified    INTERDISCIPLINARY COLLABORATION:  RN/PCT    TOTAL TREATMENT DURATION:  PT Patient Time In/Time Out  Time In: 1045  Time Out: [address] [facility]

## 2021-03-19 NOTE — PROGRESS NOTES
Problem: Falls - Risk of  Goal: *Absence of Falls  Description: Document Az Jolley Fall Risk and appropriate interventions in the flowsheet.   Outcome: Progressing Towards Goal  Note: Fall Risk Interventions:  Mobility Interventions: Bed/chair exit alarm, Communicate number of staff needed for ambulation/transfer, Patient to call before getting OOB         Medication Interventions: Bed/chair exit alarm, Patient to call before getting OOB    Elimination Interventions: Bed/chair exit alarm, Call light in reach, Patient to call for help with toileting needs              Problem: Patient Education: Go to Patient Education Activity  Goal: Patient/Family Education  Outcome: Progressing Towards Goal     Problem: Anemia Care Plan (Adult and Pediatric)  Goal: *Labs within defined limits  Outcome: Progressing Towards Goal     Problem: Patient Education: Go to Patient Education Activity  Goal: Patient/Family Education  Outcome: Progressing Towards Goal

## 2021-03-19 NOTE — PROGRESS NOTES
patient is calm and in good spirits   had a good visit with her   she said she was getting discharged today   thanked her

## 2021-03-19 NOTE — DISCHARGE SUMMARY
[employer] Hospitalist Discharge Summary     Name:    Ang Butler [age] y.o.  female  :    1920     MRN:   [id number]       Admitting Physician: Jayna Sierra MD Admit Date:  3/16/2021  4:03 PM   Attending Physician: India Coker MD  Primary Care Physician: Merna Vaca MD       Discharge Physician: Jayna Sierra MD  Discharge date: 21   Discharged Condition: Stable    Indication for Admission:   Chief Complaint   Patient presents with    Abnormal Lab Results        Reasons for hospitalization:  Hospital Problems as of 3/19/2021 Date Reviewed: 3/15/2021          Codes Class Noted - Resolved POA    Acute blood loss anemia ICD-10-CM: D62  ICD-9-CM: 285.1  3/18/2021 - Present Unknown        Exertional dyspnea ICD-10-CM: R06.00  ICD-9-CM: 786.09  3/18/2021 - Present Unknown        Bradycardia ICD-10-CM: R00.1  ICD-9-CM: 427.89  3/17/2021 - Present Unknown        Asystole ([address] ICD-10-CM: I46.9  ICD-9-CM: 427.5  3/17/2021 - Present Unknown        Weakness of left upper extremity ICD-10-CM: R29.898  ICD-9-CM: 729.89  3/17/2021 - Present Unknown        Dyspnea ICD-10-CM: R06.00  ICD-9-CM: 786.09  3/16/2021 - Present Unknown        Pleural effusion ICD-10-CM: J90  ICD-9-CM: 511.9  3/16/2021 - Present Unknown        * (Principal) Anemia, blood loss ICD-10-CM: D50.0  ICD-9-CM: 280.0  3/16/2021 - Present Unknown        H/O: CVA (cerebrovascular accident) ICD-10-CM: Z86.73  ICD-9-CM: V12.54  3/16/2021 - Present Unknown        GERD (gastroesophageal reflux disease) ICD-10-CM: K21.9  ICD-9-CM: 530.81  3/16/2021 - Present Unknown        Stage 3b chronic kidney disease ICD-10-CM: N18.32  ICD-9-CM: 585.3  3/16/2021 - Present Unknown        Diastolic CHF, chronic (HCC) ICD-10-CM: I50.32  ICD-9-CM: 428.32, 428.0  3/16/2021 - Present Unknown        Moderate mitral regurgitation ICD-10-CM: I34.0  ICD-9-CM: 424.0  3/16/2021 - Present Unknown        Moderate tricuspid regurgitation ICD-10-CM: I07.1  ICD-9-CM: 397.0  3/16/2021 - Present Unknown        Atrial fibrillation ([facility] [address] ICD-10-CM: I48.91  ICD-9-CM: 427.31  10/9/2020 - Present Yes        Facial droop ICD-10-CM: R29.810  ICD-9-CM: 781.94  10/6/2020 - Present Yes        Temporal arteritis ([facility] [address] ICD-10-CM: M31.6  ICD-9-CM: 446.5  5/1/2019 - Present Yes        Acquired hypothyroidism ICD-10-CM: E03.9  ICD-9-CM: 244.9  3/18/2016 - Present Yes             Did Patient have Sepsis (YES OR NO): No      Hospital Course :  Please refer to the admission H&P for details of presentation. In summary, iL Hernandez is a 80 y.o. female with past medical history significant for Hypothyroidism, prior stroke in 2020, atrial fibrillation only on aspirin, temporal arteritis on prednisone, GERD, CKD stage III who was admitted for acute blood loss anemia. Patient had episodes of melena prior to admission. Noted to have hemoglobin 6.8 admission received 1 unit of PRBC. GI was consulted and recommended no immediate intervention given her age and comorbidities. She had no bloody bowel movement or melanotic stool since admission. Her hemoglobin has been stable. Patient hospitalization was complicated on the night of admission where she was noted to have 10 to 15 seconds of asystole/pause on telemetry monitor. Patient quickly woke up and noted to have bradycardia which resolved. Cardiology was consulted and her medications were optimized. Echo shows severe mitral valve disease and she is not a candidate for intervention due to age and frailty. Patient also does not want to pursue any aggressive interventions. Patient is medically stable for discharge. Patient is to continue taking medications as prescribed and to follow up with PCP on discharge. Patient is instructed to to call a physician or return to ED if any concerns/symptoms worsened.    Discharge summary and encounter summary was sent to PCP electronically via \"Comm Mgt\" link in [facility], if possible. Consults: IP CONSULT TO GASTROENTEROLOGY  IP CONSULT TO CARDIOLOGY     [place]position[place] [facility]  Diet: DIET MECHANICAL SOFT   Code Status: Full Code    Discharge Info:     Current Discharge Medication List      START taking these medications    Details   ferrous sulfate 325 mg (65 mg iron) tablet Take 1 Tab by mouth daily (with breakfast). Qty: 30 Tab, Refills: 1      pantoprazole (PROTONIX) 40 mg tablet Take 1 Tab by mouth Before breakfast and dinner. Qty: 60 Tab, Refills: 1      sucralfate (CARAFATE) 1 gram tablet Take 1 Tab by mouth Before breakfast, lunch, dinner and at bedtime. Qty: 60 Tab, Refills: 1         CONTINUE these medications which have NOT CHANGED    Details   !! levothyroxine (SYNTHROID) 50 mcg tablet Take 1 tablet every Tuesday and Thursday. Qty: 24 Tab, Refills: 5      !! levothyroxine (SYNTHROID) 75 mcg tablet [id number] tablet by mouth 5 days a week. On Tuesday and Thursday only takes 50mcg tablet. Indications: a condition with low thyroid hormone levels  Qty: 60 Tab, Refills: 5      predniSONE (DELTASONE) 5 mg tablet Take 1 Tab by mouth daily. Qty: 90 Tab, Refills: 2      aspirin delayed-release 81 mg tablet Take 1 Tab by mouth daily. Qty: 30 Tab, Refills: 0       !! - Potential duplicate medications found. Please discuss with provider. STOP taking these medications       famotidine (PEPCID) 20 mg tablet Comments:   Reason for Stopping:               Medications Discontinued During This Encounter   Medication Reason    levothyroxine (SYNTHROID) tablet 75 mcg     labetaloL (NORMODYNE;TRANDATE) injection 10 mg          Follow Up Orders:  No orders of the defined types were placed in this encounter. Follow-up Information     Follow up With Specialties Details Why [facility]  Someone from [facility] will contact you to arrange home health evaluations.   [address] [facility], Emanuel Blandon MD Internal Medicine In 1 week Hb check [facility]  [phone]              Discharge Exam:    Patient Vitals for the past 24 hrs:   Temp Pulse Resp BP SpO2   03/19/21 1119 98.1 °F (36.7 °C) 60 18 (!) 151/70 96 %   03/19/21 0734 98.1 °F (36.7 °C) 61 18 (!) 159/78 98 %   03/19/21 0454 97.8 °F (36.6 °C) 70 18 137/79 97 %   03/19/21 0101 -- 69 -- -- --   03/18/21 2340 97.8 °F (36.6 °C) (!) 104 18 (!) 152/73 100 %   03/18/21 1938 97.8 °F (36.6 °C) 73 18 129/79 90 %   03/18/21 1446 98.2 °F (36.8 °C) 80 18 (!) 125/48 99 %     Oxygen Therapy  O2 Sat (%): 96 % (03/19/21 1119)  Pulse via Oximetry: 70 beats per minute (03/18/21 1052)  O2 Device: Nasal cannula (03/18/21 1052)  O2 Flow Rate (L/min): 2 l/min (03/18/21 1052)    Estimated body mass index is 22.18 kg/m² as calculated from the following:    Height as of 3/17/21: 4' 11\" (1.499 m). Weight as of this encounter: 49.8 kg (109 lb 12.8 oz). No intake or output data in the 24 hours ending 03/19/21 1225    *Note that automatically entered I/Os may not be accurate; dependent on patient compliance with collection and accurate  by assistants. Physical Exam:   General:                     alert, awake, no acute distress. Head:                          normocephalic, atraumatic  Eyes, Ears, nose:      PERRL, EOMI. Normal conjunctiva  Neck:                          supple, non-tender. Trachea midline. Lungs:                        CTAB, no wheezing, rhonchi, rales  Cardiac:                      RRR, Normal S1 and S2. Abdomen:                  Soft, non distended, nontender, +BS  Extremities:               Warm, dry. No edema   Skin:                           No rashes, no jaundice  Neuro:               AAOx3.  No gross focal neurological deficit  Psychiatric:                No anxiety, calm, cooperative    All Labs from Last 24 Hrs:  Recent Results (from the past 24 hour(s)) HEMOGLOBIN    Collection Time: 03/19/21  5:43 AM   Result Value Ref Range    HGB 7.7 (L) 11.7 - 15.4 g/dL       All Micro Results     None          SARS-CoV-2 Lab Results  \"Novel Coronavirus\" Test: No results found for: COV2NT   \"Emergent Disease\" Test: No results found for: EDPR  \"SARS-COV-2\" Test: No results found for: XGCOVT  Rapid Test:   Lab Results   Component Value Date/Time    COVR Not detected 10/08/2020 11:07 AM            Diagnostic Imaging/Tests:   Xr Chest Port    Result Date: 3/16/2021  Chest X-ray INDICATION: Shortness of breath A portable AP view of the chest was obtained. FINDINGS: There is a new right pleural effusion. There is stable cardiomegaly. The bony thorax is intact. New small right pleural effusion     Ct Code Neuro Head Wo Contrast    Result Date: 3/17/2021  EXAM: CT CODE NEURO HEAD WO CONTRAST HISTORY: .  TECHNIQUE: Axial images of the brain were performed without the administration of intravenous contrast. Images were obtained axial plane and coronal reformatted images were submitted. CT scan performed using appropriate/available dose optimization/reduction/ALARA techniques. COMPARISON: CT head dated 10/6/2020, MRI head dated 10/7/2020 FINDINGS: There is no evidence of acute intracranial hemorrhage, midline shift, or mass effect. No intra or extra-axial fluid collections are observed. There are moderate chronic appearing microangiopathic changes within the periventricular white matter. No evidence of acute confluent territorial infarction. Ventricles and basal cisterns are patent and symmetric. There is moderate generalized volume loss. Visualized paranasal sinuses and mastoid air cells are without significant fluid. Scalp, soft tissues, and calvarium are within normal limits. 1. No CT evidence of acute intracranial process. 2. Chronic changes as above.        Echocardiogram/EKG results:  Results for orders placed or performed during the hospital encounter of 03/16/21   2D ECHO COMPLETE ADULT (TTE) W OR WO CONTR    Narrative    Loi Alfaro [address] [facility], [address] [facility]  [phone]    Transthoracic Echocardiogram  2D, M-mode, Doppler, and Color Doppler    Patient: eJrri Ardon  MR #: [id number]  : [id number]  Age: 80 years  Gender: Female  Study date: 17-Mar-2021  Account #: [de-identified]  Height: 60 in  Weight: 99.9 lb  BSA: 1.39 mï¾²  Status:Routine  Location: 734  BP: 145/ 75    Allergies: AMOXICILLIN, AZITHROMYCIN, CEFTRIAXONE, CEFUROXIME, CELECOXIB,  DEXLANSOPRAZOLE, CEPHALEXIN, METRONIDAZOLE, FAMOTIDINE,  SULFAMETHOXAZOLE-TRIMETHOPRIM    Sonographer:  Jesse Boo, Technologist  Group:  [facility]  Referring Physician:  Nitesh Goode MD  Reading Physician:  CARL Quiros MD [facility]    INDICATIONS: Asystole. PROCEDURE: This was a routine study. A transthoracic echocardiogram was  performed. The study included complete 2D imaging, M-mode, complete spectral  Doppler, and color Doppler. Image quality was good. LEFT VENTRICLE: Size was normal. Systolic function was normal. Ejection  fraction was estimated in the range of 55 % to 60 %. There were no regional  wall motion abnormalities. There was mild concentric hypertrophy. Left  ventricular diastolic dysfunction is present with an average E/e' of 13.7. RIGHT VENTRICLE: The size was normal. Systolic function was normal. There was  moderate pulmonary artery hypertension. Estimated peak pressure was in the  range of 60-65 mmHg. LEFT ATRIUM: The atrium was moderately to markedly dilated. RIGHT ATRIUM: The atrium was moderately to markedly dilated. SYSTEMIC VEINS: IVC: The inferior vena cava was dilated. The respirophasic  change in diameter was more than 50%. HEPATIC VEINS: There was hepatic vein  reversal.    AORTIC VALVE: The valve was trileaflet. Leaflets exhibited moderate   sclerosis. There was mild to moderate regurgitation.     MITRAL VALVE: There was mild annular calcification. There was mild   thickening. There was no evidence for stenosis. There was severe regurgitation. TRICUSPID VALVE: The valve structure was normal. There was no evidence for  stenosis. There was moderate to severe regurgitation. PULMONIC VALVE: The valve structure was normal. There was no evidence for  stenosis. There was mild regurgitation. PERICARDIUM: There was no pericardial effusion. There was a pleural effusion  noted. AORTA: The root exhibited normal size. SUMMARY:    -  Left ventricle: Systolic function was normal. Ejection fraction was  estimated in the range of 55 % to 60 %. There were no regional wall motion  abnormalities. There was mild concentric hypertrophy. Left ventricular  diastolic dysfunction is present with an average E/e' of 13.7.    -  Right ventricle: There was moderate pulmonary artery hypertension.    -  Left atrium: The atrium was moderately to markedly dilated. -  Right atrium: The atrium was moderately to markedly dilated. -  Inferior vena cava, hepatic veins: The inferior vena cava was dilated. The  respirophasic change in diameter was more than 50%. -  Aortic valve: There was mild to moderate regurgitation.    -  Mitral valve: There was mild annular calcification. There was severe  regurgitation.    -  Tricuspid valve: There was moderate to severe regurgitation.    -  Pericardium: There was a pleural effusion noted. SYSTEM MEASUREMENT TABLES    2D mode  LA Dimension (2D): 3.6 cm  Left Atrium Systolic Volume Index; Method of Disks, Biplane; 2D mode;: 41.6  ml/m2  IVS/LVPW (2D): 1.1  IVSd (2D): 1.2 cm  LVIDd (2D): 4.4 cm  LVIDs (2D): 3.2 cm  LVOT Area (2D): 2.5 cm2  LVPWd (2D): 1.1 cm  RVIDd (2D): 3 cm    M mode  AoR Diam (MM): 2.4 cm    Unspecified Scan Mode  Peak Grad; Mean; Antegrade Flow: 15 mm[Hg]  Vmax; Antegrade Flow: 192 cm/s  LVOT Diam: 1.8 cm  Peak Grad; Mean; Antegrade Flow: 7 mm[Hg]  Vmax;  Antegrade Flow: 133 cm/s    Prepared and signed by    CARL Burgos MD [facility]  Signed 17-Mar-2021 15:56:01         EKG Results     Procedure 720 Value Units Date/Time    EKG, 12 LEAD, INITIAL [[id number]] Collected: 03/17/21 0143    Order Status: Completed Updated: 03/17/21 0852     Ventricular Rate 63 BPM      Atrial Rate 76 BPM      QRS Duration 82 ms      Q-T Interval 454 ms      QTC Calculation (Bezet) 464 ms      Calculated R Axis 11 degrees      Calculated T Axis 35 degrees      Diagnosis --     Atrial fibrillation  Abnormal ECG  When compared with ECG of 17-MAR-2021 01:39,  Previous ECG has undetermined rhythm, needs review  Confirmed by Rhonda Yang MD (), CARL VALENTE ([id number]) on 3/17/2021 8:52:28 AM      EKG, 12 LEAD, INITIAL [[id number]] Collected: 03/16/21 1638    Order Status: Completed Updated: 03/16/21 2227     Ventricular Rate 81 BPM      Atrial Rate 138 BPM      QRS Duration 82 ms      Q-T Interval 386 ms      QTC Calculation (Bezet) 448 ms      Calculated R Axis 40 degrees      Calculated T Axis 32 degrees      Diagnosis --     !! AGE AND GENDER SPECIFIC ECG ANALYSIS !!   Atrial fibrillation  Abnormal ECG  When compared with ECG of 19-FEB-2019 10:54,  Atrial fibrillation has replaced Sinus rhythm  Nonspecific T wave abnormality now evident in Anterior leads  Confirmed by ST GOPAL LACY MD (), TRACY MARI ([id number]) on 3/16/2021 10:26:50 PM            Results for orders placed or performed during the hospital encounter of 03/16/21   EKG, 12 LEAD, INITIAL   Result Value Ref Range    Ventricular Rate 63 BPM    Atrial Rate 76 BPM    QRS Duration 82 ms    Q-T Interval 454 ms    QTC Calculation (Bezet) 464 ms    Calculated R Axis 11 degrees    Calculated T Axis 35 degrees    Diagnosis       Atrial fibrillation  Abnormal ECG  When compared with ECG of 17-MAR-2021 01:39,  Previous ECG has undetermined rhythm, needs review  Confirmed by Rhonda Yang MD (), Deon Ramírez ([id number]) on 3/17/2021 8:52:28 AM         Procedures done this admission:  * No surgery found *        Time spent in patient discharge planning and coordination 40 minutes.       Signed By: Chriss Summers MD   [facility] Hospitalist Service    March 19, 2021  12:13 PM

## 2021-03-19 NOTE — PROGRESS NOTES
[facility] CRITICAL CARE OUTREACH NURSE PROGRESS REPORT      SUBJECTIVE: Follow up to assess patient secondary to code S.      MEWS Score: 2 (03/18/21 2340)  Vitals:    03/18/21 1446 03/18/21 1938 03/18/21 2340 03/19/21 0101   BP: (!) 125/48 129/79 (!) 152/73    Pulse: 80 73 (!) 104 69   Resp: 18 18 18    Temp: 98.2 °F (36.8 °C) 97.8 °F (36.6 °C) 97.8 °F (36.6 °C)    SpO2: 99% 90% 100%    Weight:       Height:          EKG: normal EKG, normal sinus rhythm, unchanged from previous tracings, atrial fibrillation, rate 76. LAB DATA:    Recent Labs     03/18/21  0854 03/17/21  0522 03/16/21  1608    142 140   K 3.9 4.4 4.6   * 112* 109*   CO2 25 22 23   AGAP 8 8 8   GLU 82 118* 128*   BUN 34* 37* 38*   CREA 1.20* 1.22* 1.33*   GFRAA 53* 52* 47*   GFRNA 44* 43* 39*   CA 8.2* 8.4 8.9   ALB  --   --  3.4   TP  --   --  6.8   GLOB  --   --  3.4   AGRAT  --   --  1.0*   ALT  --   --  133*        Recent Labs     03/18/21  0854 03/17/21  0522 03/16/21  1608   WBC 9.4 10.3 7.4   HGB 8.0* 8.0* 6.8*   HCT 27.4* 27.4* 24.6*    255 300          OBJECTIVE: On arrival to room, I found patient to be sitting up in bed watching TV. Pain Assessment  Pain Intensity 1: 0 (03/18/21 1938)        Patient Stated Pain Goal: 0                                 ASSESSMENT:  Patient alert and oriented. Afib on monitor per remote tele. Patient states, \"im feeling so much better. Thank you all for helping me. \"  Patient denies pain at present time.       PLAN:  Will follow up per outreach protocol

## 2021-03-19 NOTE — PROGRESS NOTES
Discharge entered, son notified that primary RN will contact him to come  patient when she is ready for discharge.

## 2021-03-19 NOTE — PROGRESS NOTES
Oxygen Qualifier       Room air: SpO2 with O2 and liter flow   Resting SpO2  95%   99% on 2L   Ambulating SpO2  88% 91% on 1L  96 on 2L          Completed by:    Toby Irvin, Respiratory Assistant

## 2021-03-27 NOTE — PROGRESS NOTES
Physician Progress Note Hesham Strong 
CSN #:                  [id number] :                       1920 ADMIT DATE:       3/16/2021 4:03 PM 
[address] DATE:        3/19/2021 2:42 PM 
RESPONDING 
PROVIDER #:        Liane Brown MD 
 
 
 
 
QUERY TEXT: 
 
Pt admitted with symptomatic anemia. Pt noted to have left sided facial droop, difficulty speaking, and difficulty raising her left hand. If possible, please document in progress notes and discharge summary if after study the etiology of the facial droop can be further specified as: The medical record reflects the following: 
Risk Factors: ABLA/GIB, BB, Age Clinical Indicators: 
--3/17 PN states: Hypotensive TIA- Patient was also noted to have left sided facial droop, difficulty speaking, and difficulty raising 
her left hand by nursing staff. When she was evaluated by Noct[facility]ist, her symptoms have resolved; CT head without any 
abnormalities. Not a tPA candidate per Tele neuro. Symptoms have fully resolved within less than 30mins. --3/19 D/C Summary states: Patient hospitalization was complicated on the night of admission where she was noted to have 10 to 
15 seconds of asystole/pause on telemetry monitor. Patient quickly woke up and noted to have bradycardia which resolved. Echo shows severe mitral valve disease. Patient also does not want to pursue any aggressive interventions Treatment: Cardiology consult, MANOHAR. Pt refused MRI, CT head Options provided: 
-- Facial droop due to CVA 
-- Facial droop due to TIA 
-- Unable to determine 
-- Other - I will add my own diagnosis -- Disagree - Not applicable / Not valid -- Disagree - Clinically unable to determine / Unknown 
-- Refer to Clinical Documentation Reviewer PROVIDER RESPONSE TEXT: 
 
Secondary to hypotension and asystole.  
 
Query created by: Ale Zhang on 3/25/2021 11:17 AM 
 
 
Electronically signed by:  Liane Brown MD 3/27/2021 3:34 AM

## 2021-04-02 ENCOUNTER — HOSPITAL ENCOUNTER (EMERGENCY)
Age: 86
Discharge: HOME | DRG: 291 | End: 2021-04-02
Attending: EMERGENCY MEDICINE
Payer: MEDICARE

## 2021-04-02 ENCOUNTER — APPOINTMENT (OUTPATIENT)
Dept: GENERAL RADIOLOGY | Age: 86
DRG: 291 | End: 2021-04-02
Attending: EMERGENCY MEDICINE
Payer: MEDICARE

## 2021-04-02 VITALS
HEART RATE: 82 BPM | DIASTOLIC BLOOD PRESSURE: 95 MMHG | OXYGEN SATURATION: 94 % | RESPIRATION RATE: 22 BRPM | SYSTOLIC BLOOD PRESSURE: 148 MMHG

## 2021-04-02 DIAGNOSIS — R60.9 EDEMA, UNSPECIFIED TYPE: Primary | ICD-10-CM

## 2021-04-02 DIAGNOSIS — K21.9 GASTROESOPHAGEAL REFLUX DISEASE WITHOUT ESOPHAGITIS: ICD-10-CM

## 2021-04-02 DIAGNOSIS — I50.9 CONGESTIVE HEART FAILURE, UNSPECIFIED HF CHRONICITY, UNSPECIFIED HEART FAILURE TYPE (HCC): ICD-10-CM

## 2021-04-02 LAB
ALBUMIN SERPL-MCNC: 3.3 G/DL (ref 3.2–4.6)
ALBUMIN/GLOB SERPL: 1.1 {RATIO} (ref 1.2–3.5)
ALP SERPL-CCNC: 109 U/L (ref 50–136)
ALT SERPL-CCNC: 133 U/L (ref 12–65)
ANION GAP SERPL CALC-SCNC: 10 MMOL/L (ref 7–16)
ARTERIAL PATENCY WRIST A: POSITIVE
AST SERPL-CCNC: 66 U/L (ref 15–37)
ATRIAL RATE: 92 BPM
BASE DEFICIT BLD-SCNC: 5.2 MMOL/L
BASOPHILS # BLD: 0 K/UL (ref 0–0.2)
BASOPHILS NFR BLD: 0 % (ref 0–2)
BDY SITE: ABNORMAL
BILIRUB SERPL-MCNC: 0.8 MG/DL (ref 0.2–1.1)
BNP SERPL-MCNC: 2610 PG/ML
BUN SERPL-MCNC: 53 MG/DL (ref 8–23)
CALCIUM SERPL-MCNC: 8.8 MG/DL (ref 8.3–10.4)
CALCULATED R AXIS, ECG10: 46 DEGREES
CALCULATED T AXIS, ECG11: 63 DEGREES
CHLORIDE SERPL-SCNC: 108 MMOL/L (ref 98–107)
CO2 BLD-SCNC: 18 MMOL/L (ref 13–23)
CO2 SERPL-SCNC: 19 MMOL/L (ref 21–32)
CREAT SERPL-MCNC: 1.4 MG/DL (ref 0.6–1)
DIAGNOSIS, 93000: NORMAL
DIFFERENTIAL METHOD BLD: ABNORMAL
EOSINOPHIL # BLD: 0 K/UL (ref 0–0.8)
EOSINOPHIL NFR BLD: 0 % (ref 0.5–7.8)
ERYTHROCYTE [DISTWIDTH] IN BLOOD BY AUTOMATED COUNT: 23.8 % (ref 11.9–14.6)
GAS FLOW.O2 O2 DELIVERY SYS: ABNORMAL L/MIN
GLOBULIN SER CALC-MCNC: 2.9 G/DL (ref 2.3–3.5)
GLUCOSE SERPL-MCNC: 124 MG/DL (ref 65–100)
HCO3 BLD-SCNC: 17.6 MMOL/L (ref 22–26)
HCT VFR BLD AUTO: 29.7 % (ref 35.8–46.3)
HGB BLD-MCNC: 8.7 G/DL (ref 11.7–15.4)
IMM GRANULOCYTES # BLD AUTO: 0 K/UL (ref 0–0.5)
IMM GRANULOCYTES NFR BLD AUTO: 0 % (ref 0–5)
LYMPHOCYTES # BLD: 0.8 K/UL (ref 0.5–4.6)
LYMPHOCYTES NFR BLD: 10 % (ref 13–44)
MCH RBC QN AUTO: 21.1 PG (ref 26.1–32.9)
MCHC RBC AUTO-ENTMCNC: 29.3 G/DL (ref 31.4–35)
MCV RBC AUTO: 72.1 FL (ref 79.6–97.8)
MONOCYTES # BLD: 0.6 K/UL (ref 0.1–1.3)
MONOCYTES NFR BLD: 7 % (ref 4–12)
NEUTS SEG # BLD: 7.3 K/UL (ref 1.7–8.2)
NEUTS SEG NFR BLD: 84 % (ref 43–78)
NRBC # BLD: 0.05 K/UL (ref 0–0.2)
PCO2 BLD: 25.4 MMHG (ref 35–45)
PH BLD: 7.45 [PH] (ref 7.35–7.45)
PLATELET # BLD AUTO: 335 K/UL (ref 150–450)
PMV BLD AUTO: 10 FL (ref 9.4–12.3)
PO2 BLD: 153 MMHG (ref 75–100)
POTASSIUM SERPL-SCNC: 4.7 MMOL/L (ref 3.5–5.1)
PROT SERPL-MCNC: 6.2 G/DL (ref 6.3–8.2)
Q-T INTERVAL, ECG07: 416 MS
QRS DURATION, ECG06: 82 MS
QTC CALCULATION (BEZET), ECG08: 479 MS
RBC # BLD AUTO: 4.12 M/UL (ref 4.05–5.2)
SAO2 % BLD: 99.5 % (ref 95–98)
SERVICE CMNT-IMP: ABNORMAL
SODIUM SERPL-SCNC: 137 MMOL/L (ref 136–145)
SPECIMEN TYPE: ABNORMAL
VENTRICULAR RATE, ECG03: 80 BPM
WBC # BLD AUTO: 8.8 K/UL (ref 4.3–11.1)

## 2021-04-02 PROCEDURE — 36600 WITHDRAWAL OF ARTERIAL BLOOD: CPT

## 2021-04-02 PROCEDURE — 93005 ELECTROCARDIOGRAM TRACING: CPT | Performed by: EMERGENCY MEDICINE

## 2021-04-02 PROCEDURE — 99284 EMERGENCY DEPT VISIT MOD MDM: CPT

## 2021-04-02 PROCEDURE — 96374 THER/PROPH/DIAG INJ IV PUSH: CPT

## 2021-04-02 PROCEDURE — 80053 COMPREHEN METABOLIC PANEL: CPT

## 2021-04-02 PROCEDURE — 71045 X-RAY EXAM CHEST 1 VIEW: CPT

## 2021-04-02 PROCEDURE — 74011250636 HC RX REV CODE- 250/636: Performed by: EMERGENCY MEDICINE

## 2021-04-02 PROCEDURE — 85025 COMPLETE CBC W/AUTO DIFF WBC: CPT

## 2021-04-02 PROCEDURE — 82803 BLOOD GASES ANY COMBINATION: CPT

## 2021-04-02 PROCEDURE — 83880 ASSAY OF NATRIURETIC PEPTIDE: CPT

## 2021-04-02 RX ORDER — FUROSEMIDE 10 MG/ML
20 INJECTION INTRAMUSCULAR; INTRAVENOUS
Status: COMPLETED | OUTPATIENT
Start: 2021-04-02 | End: 2021-04-02

## 2021-04-02 RX ADMIN — FUROSEMIDE 20 MG: 10 INJECTION, SOLUTION INTRAMUSCULAR; INTRAVENOUS at 19:12

## 2021-04-02 NOTE — ED NOTES
I have reviewed discharge instructions with the son. The son verbalized understanding. Patient left ED via Discharge Method: wheelchair to rolling green with son Opportunity for questions and clarification provided. Patient given 0 scripts. To continue your aftercare when you leave the hospital, you may receive an automated call from our care team to check in on how you are doing. This is a free service and part of our promise to provide the best care and service to meet your aftercare needs.  If you have questions, or wish to unsubscribe from this service please call [phone]. Thank you for Choosing our [facility] Emergency Department.

## 2021-04-02 NOTE — ED PROVIDER NOTES
Patient is a 8-year-old female who sent to the emergency department today via EMS from her living facility for evaluation of shortness of breath and questionable hypoxia. Patient had recent admission to the hospital for anemia and received blood transfusion. Home health nurse checked on her today and noted an oxygen saturation of 80% but was unclear sure if it was accurate due to a poor waveform. They recommended she come to the ER for evaluation. Patient is not a very good historian. She states she feels a little short of breath but she really voices no acute complaints. Her son did arrive at the bedside a short time later and states she is actually breathing better than on her previous admission. The history is provided by the patient, a relative and the EMS personnel. Shortness of Breath This is a new problem. The problem has been gradually worsening. Associated symptoms include leg swelling. Pertinent negatives include no fever, no rhinorrhea, no sore throat, no neck pain, no cough, no sputum production, no chest pain, no syncope, no vomiting, no abdominal pain and no rash. She has had prior hospitalizations. She has had no prior ED visits. She has had no prior ICU admissions. Associated medical issues do not include asthma, COPD or chronic lung disease. Past Medical History:  
Diagnosis Date  Anemia  Arthritis  Atrial fibrillation ([address] 10/2020  Cerebral microvascular disease 2019 CT, MRI, Carotid U/S  
 CHF (congestive heart failure) ([address]  CVA (cerebral vascular accident) ([address] 10/6/2020  Dependent edema  GERD (gastroesophageal reflux disease)  Headache   
 ocular migraines  Inguinal hernia recurrent bilateral   
 Severe mitral regurgitation  Stasis dermatitis of both legs  Temporal arteritis ([address] right temple- empiric tx with steroids  Thyroid disease   
 hypothyroid  TMJ click 45/9620  
 bilateral  
 
 
Past Surgical History: Procedure Laterality Date  HX COLONOSCOPY    
 HX ENDOSCOPY    
 HH, gastritis,GERD  
 HX GI Antibiotics cause diarrhea  HX GYN Csection  HX HEENT Bilateral Cataract  HX ORTHOPAEDIC    
 wrist surg for fracture Family History:  
Problem Relation Age of Onset  Stroke Mother Social History Socioeconomic History  Marital status:  Spouse name: Not on file  Number of children: Not on file  Years of education: Not on file  Highest education level: Not on file Occupational History  Not on file Social Needs  Financial resource strain: Not on file  Food insecurity Worry: Not on file Inability: Not on file  Transportation needs Medical: Not on file Non-medical: Not on file Tobacco Use  Smoking status: Never Smoker  Smokeless tobacco: Never Used Substance and Sexual Activity  Alcohol use: No  
 Drug use: No  
 Sexual activity: Not on file Lifestyle  Physical activity Days per week: Not on file Minutes per session: Not on file  Stress: Not on file Relationships  Social connections Talks on phone: Not on file Gets together: Not on file Attends Protestant service: Not on file Active member of club or organization: Not on file Attends meetings of clubs or organizations: Not on file Relationship status: Not on file  Intimate partner violence Fear of current or ex partner: Not on file Emotionally abused: Not on file Physically abused: Not on file Forced sexual activity: Not on file Other Topics Concern  Not on file Social History Narrative  Not on file ALLERGIES: Amoxicillin, Azithromycin, Ceftriaxone, Cefuroxime, Celecoxib, Dexilant [dexlansoprazole], Keflex [cephalexin], Metronidazole, Pepcid [famotidine], and Sulfamethoxazole-trimethoprim Review of Systems Constitutional: Negative for chills, fatigue and fever.   
HENT: Negative for congestion, rhinorrhea and sore throat. Eyes: Negative for pain, discharge and visual disturbance. Respiratory: Positive for shortness of breath. Negative for cough and sputum production. Cardiovascular: Positive for leg swelling. Negative for chest pain, palpitations and syncope. Gastrointestinal: Negative for abdominal pain, diarrhea, nausea and vomiting. Endocrine: Negative for polydipsia and polyuria. Genitourinary: Negative for dysuria, frequency and urgency. Musculoskeletal: Negative for back pain and neck pain. Skin: Negative for rash. Neurological: Negative for seizures, syncope and weakness. Hematological: Negative. Vitals:  
 04/02/21 1605 04/02/21 1611 04/02/21 1616 BP: (!) 150/80 (!) 161/83 Pulse: 80  82 Resp:   15  
      
 
Physical Exam 
Vitals signs and nursing note reviewed. Constitutional:   
   Appearance: She is well-developed. She is ill-appearing. HENT:  
   Head: Normocephalic and atraumatic. Eyes:  
   Extraocular Movements: Extraocular movements intact. Conjunctiva/sclera: Conjunctivae normal.  
   Pupils: Pupils are equal, round, and reactive to light. Neck: Musculoskeletal: Normal range of motion and neck supple. Cardiovascular:  
   Rate and Rhythm: Normal rate. Rhythm irregular. Pulmonary:  
   Effort: Accessory muscle usage present. Breath sounds: Decreased breath sounds and rales present. Chest:  
   Chest wall: No deformity or crepitus. Abdominal:  
   Palpations: Abdomen is soft. Tenderness: There is no abdominal tenderness. There is no guarding or rebound. Musculoskeletal: Normal range of motion. General: No deformity. Right lower leg: She exhibits no tenderness. Edema present. Left lower leg: She exhibits no tenderness. Edema present. Lymphadenopathy:  
   Cervical: No cervical adenopathy. Skin: 
   General: Skin is warm and dry. Findings: No rash. Neurological: Mental Status: She is alert and oriented to person, place, and time. GCS: GCS eye subscore is 4. GCS verbal subscore is 5. GCS motor subscore is 6. Cranial Nerves: No cranial nerve deficit. Sensory: No sensory deficit. MDM Number of Diagnoses or Management Options Diagnosis management comments: I wore appropriate PPE throughout this patient's ED visit. Daina Serrato MD, 4:54 PM 
 
EKG reviewed by me shows atrial fibrillation at a rate of 80. No acute ischemic elevation or depression. Normal axis. Normal intervals. 6:28 PM 
Chest x-ray showed possible mild interstitial edema and small pleural effusions On arterial blood gas patient was not hypoxic the waveform on the pulse oximeter was very bad here I do not think the tracking of 79% was accurate with the home health nurse today. Hemoglobin is improved from last admission Chemistries show a creatinine up to 1.4 which is slightly above previous I had multiple discussions with patient and her son at the bedside about admitting the patient for diuresis and observation. Patient does not want surgery she does not want CPR or any sort of resuscitation. She also does not want to be admitted to the hospital tonight. She understands all of these risks and she is fixated on her gastroesophageal reflux. She is agreeable to 1 dose of IV Lasix here. She will follow-up with her doctor next week. I advised her to please return to the emergency department for any new or worsening symptoms. Amount and/or Complexity of Data Reviewed Clinical lab tests: ordered and reviewed Tests in the radiology section of CPT®: ordered and reviewed Decide to obtain previous medical records or to obtain history from someone other than the patient: yes Independent visualization of images, tracings, or specimens: yes Risk of Complications, Morbidity, and/or Mortality Presenting problems: moderate Diagnostic procedures: low Management options: low Patient Progress Patient progress: stable Procedures

## 2021-04-02 NOTE — DISCHARGE INSTRUCTIONS
Continue with all of your current medications and care. Follow-up with your doctor or return for any other acute concerns.

## 2021-04-02 NOTE — ED TRIAGE NOTES
Pt has been at [facility] got o2 sat of 80% but unsure if accurate. Hx of CHF, extremity edema, history of anemia and non compliant with medications. \"purplish tint to extremities\". A&O x 4. Mask applied to pt.

## 2021-04-03 ENCOUNTER — HOSPITAL ENCOUNTER (INPATIENT)
Age: 86
LOS: 4 days | Discharge: NURSING, REHAB OR LONG-TERM CARE | DRG: 291 | End: 2021-04-07
Attending: EMERGENCY MEDICINE | Admitting: INTERNAL MEDICINE
Payer: MEDICARE

## 2021-04-03 ENCOUNTER — APPOINTMENT (OUTPATIENT)
Dept: GENERAL RADIOLOGY | Age: 86
DRG: 291 | End: 2021-04-03
Attending: EMERGENCY MEDICINE
Payer: MEDICARE

## 2021-04-03 DIAGNOSIS — N28.9 RENAL INSUFFICIENCY: ICD-10-CM

## 2021-04-03 DIAGNOSIS — R09.02 HYPOXIA: Primary | ICD-10-CM

## 2021-04-03 DIAGNOSIS — I48.20 CHRONIC ATRIAL FIBRILLATION (HCC): ICD-10-CM

## 2021-04-03 PROBLEM — I50.33 DIASTOLIC CHF, ACUTE ON CHRONIC (HCC): Status: ACTIVE | Noted: 2021-04-03

## 2021-04-03 PROBLEM — G93.41 ACUTE METABOLIC ENCEPHALOPATHY: Status: ACTIVE | Noted: 2021-04-03

## 2021-04-03 PROBLEM — I27.20 PULMONARY HYPERTENSION (HCC): Chronic | Status: ACTIVE | Noted: 2021-04-03

## 2021-04-03 PROBLEM — J96.01 ACUTE RESPIRATORY FAILURE WITH HYPOXIA (HCC): Status: ACTIVE | Noted: 2021-04-03

## 2021-04-03 PROBLEM — R74.01 TRANSAMINITIS: Status: ACTIVE | Noted: 2021-04-03

## 2021-04-03 LAB
ALBUMIN SERPL-MCNC: 3.2 G/DL (ref 3.2–4.6)
ALBUMIN/GLOB SERPL: 1 {RATIO} (ref 1.2–3.5)
ALP SERPL-CCNC: 115 U/L (ref 50–136)
ALT SERPL-CCNC: 146 U/L (ref 12–65)
ANION GAP SERPL CALC-SCNC: 10 MMOL/L (ref 7–16)
AST SERPL-CCNC: 82 U/L (ref 15–37)
BASOPHILS # BLD: 0 K/UL (ref 0–0.2)
BASOPHILS NFR BLD: 0 % (ref 0–2)
BILIRUB SERPL-MCNC: 0.8 MG/DL (ref 0.2–1.1)
BNP SERPL-MCNC: 2255 PG/ML
BUN SERPL-MCNC: 60 MG/DL (ref 8–23)
CALCIUM SERPL-MCNC: 8.7 MG/DL (ref 8.3–10.4)
CHLORIDE SERPL-SCNC: 106 MMOL/L (ref 98–107)
CO2 SERPL-SCNC: 23 MMOL/L (ref 21–32)
CREAT SERPL-MCNC: 1.64 MG/DL (ref 0.6–1)
DIFFERENTIAL METHOD BLD: ABNORMAL
EOSINOPHIL # BLD: 0 K/UL (ref 0–0.8)
EOSINOPHIL NFR BLD: 0 % (ref 0.5–7.8)
ERYTHROCYTE [DISTWIDTH] IN BLOOD BY AUTOMATED COUNT: 24.1 % (ref 11.9–14.6)
GLOBULIN SER CALC-MCNC: 3.2 G/DL (ref 2.3–3.5)
GLUCOSE SERPL-MCNC: 123 MG/DL (ref 65–100)
HCT VFR BLD AUTO: 30.4 % (ref 35.8–46.3)
HGB BLD-MCNC: 9 G/DL (ref 11.7–15.4)
IMM GRANULOCYTES # BLD AUTO: 0 K/UL (ref 0–0.5)
IMM GRANULOCYTES NFR BLD AUTO: 0 % (ref 0–5)
LYMPHOCYTES # BLD: 0.7 K/UL (ref 0.5–4.6)
LYMPHOCYTES NFR BLD: 6 % (ref 13–44)
MCH RBC QN AUTO: 21.1 PG (ref 26.1–32.9)
MCHC RBC AUTO-ENTMCNC: 29.6 G/DL (ref 31.4–35)
MCV RBC AUTO: 71.4 FL (ref 79.6–97.8)
MONOCYTES # BLD: 0.7 K/UL (ref 0.1–1.3)
MONOCYTES NFR BLD: 6 % (ref 4–12)
NEUTS SEG # BLD: 9.8 K/UL (ref 1.7–8.2)
NEUTS SEG NFR BLD: 87 % (ref 43–78)
NRBC # BLD: 0.08 K/UL (ref 0–0.2)
PLATELET # BLD AUTO: 326 K/UL (ref 150–450)
PMV BLD AUTO: 10.2 FL (ref 9.4–12.3)
POTASSIUM SERPL-SCNC: 4.3 MMOL/L (ref 3.5–5.1)
PROT SERPL-MCNC: 6.4 G/DL (ref 6.3–8.2)
RBC # BLD AUTO: 4.26 M/UL (ref 4.05–5.2)
SODIUM SERPL-SCNC: 139 MMOL/L (ref 136–145)
TROPONIN-HIGH SENSITIVITY: 37.8 PG/ML (ref 0–14)
WBC # BLD AUTO: 11.3 K/UL (ref 4.3–11.1)

## 2021-04-03 PROCEDURE — 65660000000 HC RM CCU STEPDOWN

## 2021-04-03 PROCEDURE — 93005 ELECTROCARDIOGRAM TRACING: CPT | Performed by: EMERGENCY MEDICINE

## 2021-04-03 PROCEDURE — 71045 X-RAY EXAM CHEST 1 VIEW: CPT

## 2021-04-03 PROCEDURE — 85025 COMPLETE CBC W/AUTO DIFF WBC: CPT

## 2021-04-03 PROCEDURE — 99285 EMERGENCY DEPT VISIT HI MDM: CPT

## 2021-04-03 PROCEDURE — 80053 COMPREHEN METABOLIC PANEL: CPT

## 2021-04-03 PROCEDURE — 84484 ASSAY OF TROPONIN QUANT: CPT

## 2021-04-03 PROCEDURE — 83880 ASSAY OF NATRIURETIC PEPTIDE: CPT

## 2021-04-03 RX ORDER — ACETAMINOPHEN 650 MG/1
650 SUPPOSITORY RECTAL
Status: DISCONTINUED | OUTPATIENT
Start: 2021-04-03 | End: 2021-04-07 | Stop reason: HOSPADM

## 2021-04-03 RX ORDER — ONDANSETRON 2 MG/ML
4 INJECTION INTRAMUSCULAR; INTRAVENOUS
Status: DISCONTINUED | OUTPATIENT
Start: 2021-04-03 | End: 2021-04-07 | Stop reason: HOSPADM

## 2021-04-03 RX ORDER — MORPHINE SULFATE 2 MG/ML
1 INJECTION, SOLUTION INTRAMUSCULAR; INTRAVENOUS
Status: DISCONTINUED | OUTPATIENT
Start: 2021-04-03 | End: 2021-04-07 | Stop reason: HOSPADM

## 2021-04-03 RX ORDER — SODIUM CHLORIDE 0.9 % (FLUSH) 0.9 %
5-40 SYRINGE (ML) INJECTION EVERY 8 HOURS
Status: DISCONTINUED | OUTPATIENT
Start: 2021-04-03 | End: 2021-04-05

## 2021-04-03 RX ORDER — ACETAMINOPHEN 325 MG/1
650 TABLET ORAL
Status: DISCONTINUED | OUTPATIENT
Start: 2021-04-03 | End: 2021-04-07 | Stop reason: HOSPADM

## 2021-04-03 RX ORDER — SODIUM CHLORIDE 0.9 % (FLUSH) 0.9 %
5-40 SYRINGE (ML) INJECTION AS NEEDED
Status: DISCONTINUED | OUTPATIENT
Start: 2021-04-03 | End: 2021-04-07 | Stop reason: HOSPADM

## 2021-04-03 RX ADMIN — Medication 10 ML: at 21:50

## 2021-04-03 NOTE — PROGRESS NOTES
Advance care planninginitial encounter Face to face time - 15 minutes face-to-face time spent discussion the care Patient is able to make his/her own decisions: 
 
NO 
 
Names of POA/surrogate decision maker when patient is altered Pascual Sanchez Patient / surrogate decision-maker ultimately chose. .. DNR/DNI, strict comfort care ONLY Matteo Kerr MD

## 2021-04-03 NOTE — H&P
[facility]  
History and Physical  
 
 
Name:  Prakash Sewell  Age:89 y.o. Sex:female :  1920 MRN:  [id number] PCP:  Jazzy Hope MD 
 
 
Admit Date:  4/3/2021  4:47 PM  
Chief Complaint: shortness of breath Reason for Admission:  
Acute respiratory failure with hypoxia ([facility] [J96.01] Assessment & Plan:  
 
 
Acute on chronic diastolic CHF exacerbation, acute hypoxic respiratory failure, severe mitral regurgitation, pulmonary HTN: 
Admit for comfort care, son requesting hospice house O2 for comfort Prn morphine Disposition: pending Diet: DIET NPO 
VTE ppx: none GI ppx: none CODE STATUS: Prior Surrogate decision-maker: Kira Monreal [phone] History of Presenting Illness:  
 
Prakash Sewell is a 80 y.o. female with medical history of  chronic diastolic CHF, acute hypoxic respiratory failure, severe mitral regurgitation, pulmonary HTN, anemia, AFIB, CVA, CKD3,  who presented to ED with hypoxia and shortness of breath. She lives independently at [facility] and was seen in the ED for shortness of breath and lower O2 sats on 21. She and her son refused admit and returned to home. She was found confused with lower O2 sats today and returns to the ED. CXR shows pulmonary edema. Her O2 sats are difficult to obtain. She is more confused than her baseline status per her son Jolynn Frazier at bedside. He is requesting comfort care and hospice as he feels she will continue to decline. I also discussed care with Dr. Gaston Reyes of the ED. Review of Systems: A 14 point review of systems not possible due to mentation Past Medical History:  
Diagnosis Date  Anemia  Arthritis  Atrial fibrillation ([facility] 10/2020  Cerebral microvascular disease 2019 CT, MRI, Carotid U/S  
 CHF (congestive heart failure) ([facility]  CVA (cerebral vascular accident) ([facility] 10/6/2020  Dependent edema  GERD (gastroesophageal reflux disease)  Headache ocular migraines  Inguinal hernia recurrent bilateral   
 Severe mitral regurgitation  Stasis dermatitis of both legs  Temporal arteritis ([address] right temple- empiric tx with steroids  Thyroid disease   
 hypothyroid  TMJ click 60/3240  
 bilateral  
 
 
Past Surgical History:  
Procedure Laterality Date  HX COLONOSCOPY    
 HX ENDOSCOPY    
 HH, gastritis,GERD  
 HX GI Antibiotics cause diarrhea  HX GYN Csection  HX HEENT Bilateral Cataract  HX ORTHOPAEDIC    
 wrist surg for fracture Family History : reviewed Family History Problem Relation Age of Onset  Stroke Mother Social History Tobacco Use  Smoking status: Never Smoker  Smokeless tobacco: Never Used Substance Use Topics  Alcohol use: No  
 
 
Allergies Allergen Reactions  Amoxicillin Diarrhea  Azithromycin Diarrhea  Ceftriaxone Diarrhea  Cefuroxime Diarrhea  Celecoxib Diarrhea  Dexilant [Dexlansoprazole] Diarrhea  Keflex [Cephalexin] Diarrhea  Metronidazole Diarrhea  Pepcid [Famotidine] Diarrhea  Sulfamethoxazole-Trimethoprim Diarrhea Immunization History Administered Date(s) Administered  Covid-19, MODERNA, Mrna, Lnp-s, Pf, 100mcg/0.5mL 01/23/2021, 02/17/2021  Influenza High Dose Vaccine PF 09/03/2019  Influenza Vaccine 10/01/2015, 09/30/2017, 09/09/2018  Influenza Vaccine (Tri) Adjuvanted (>65 Yrs FLUAD TRI [id number]) 09/09/2018  Influenza, Quadrivalent, Adjuvanted (>65 Yrs FLUAD QUAD [id number]) 09/13/2020  Pneumococcal Vaccine (Unspecified Type) 01/01/2001  TB Skin Test (PPD) Intradermal 02/19/2019, 10/07/2020 PTA Medications: 
Current Outpatient Medications Medication Instructions  aspirin delayed-release 81 mg, Oral, DAILY  ferrous sulfate 325 mg, Oral, DAILY WITH BREAKFAST  levothyroxine (SYNTHROID) 50 mcg tablet Take 1 tablet every Tuesday and Thursday.   
 levothyroxine (SYNTHROID) 75 mcg tablet [id number] tablet by mouth 5 days a week. On Tuesday and Thursday only takes 50mcg tablet.  pantoprazole (PROTONIX) 40 mg, Oral, 2 TIMES DAILY BEFORE MEALS  predniSONE (DELTASONE) 5 mg, Oral, DAILY  sucralfate (CARAFATE) 1 g, Oral, 4 TIMES DAILY BEFORE MEALS & NIGHTLY Objective:  
 
Patient Vitals for the past 24 hrs: 
 Temp Pulse Resp BP SpO2  
04/03/21 1830  84  (!) 177/86 (!) 75 % 04/03/21 1736  78  (!) 195/77   
04/03/21 1654  75  (!) 176/78 (!) 87 % 04/03/21 1653 96.8 °F (36 °C) 76 24 (!) 176/78 90 % Oxygen Therapy O2 Sat (%): (!) 75 % (04/03/21 1830) Pulse via Oximetry: 78 beats per minute (04/03/21 1830) O2 Device: Nasal cannula (04/03/21 1747) O2 Flow Rate (L/min): 4 l/min (04/03/21 1747) Body mass index is 22.17 kg/m². Physical Exam: 
 
General: elderly, frail appears short of breath HEENT: Pupils equal, anicteric Neck: no JVD Lungs: Clear to auscultation bilaterally anterior Cardiovascular: irregular, 1+ edema Abdomen: Soft, nontender, nondistended, normoactive bowel sounds Extremities: No cyanosis Neuro: lethargic Psych: unable to assess Data Reviewed: I have reviewed all labs, meds, and studies. Recent Results (from the past 24 hour(s)) CBC WITH AUTOMATED DIFF Collection Time: 04/03/21  5:18 PM  
Result Value Ref Range WBC 11.3 (H) 4.3 - 11.1 K/uL  
 RBC 4.26 4.05 - 5.2 M/uL HGB 9.0 (L) 11.7 - 15.4 g/dL HCT 30.4 (L) 35.8 - 46.3 % MCV 71.4 (L) 79.6 - 97.8 FL  
 MCH 21.1 (L) 26.1 - 32.9 PG  
 MCHC 29.6 (L) 31.4 - 35.0 g/dL RDW 24.1 (H) 11.9 - 14.6 % PLATELET 258 436 - 389 K/uL MPV 10.2 9.4 - 12.3 FL ABSOLUTE NRBC 0.08 0.0 - 0.2 K/uL  
 DF AUTOMATED NEUTROPHILS 87 (H) 43 - 78 % LYMPHOCYTES 6 (L) 13 - 44 % MONOCYTES 6 4.0 - 12.0 % EOSINOPHILS 0 (L) 0.5 - 7.8 % BASOPHILS 0 0.0 - 2.0 % IMMATURE GRANULOCYTES 0 0.0 - 5.0 %  
 ABS. NEUTROPHILS 9.8 (H) 1.7 - 8.2 K/UL  
 ABS.  LYMPHOCYTES 0.7 0.5 - 4.6 K/UL  
 ABS. MONOCYTES 0.7 0.1 - 1.3 K/UL  
 ABS. EOSINOPHILS 0.0 0.0 - 0.8 K/UL  
 ABS. BASOPHILS 0.0 0.0 - 0.2 K/UL  
 ABS. IMM. GRANS. 0.0 0.0 - 0.5 K/UL METABOLIC PANEL, COMPREHENSIVE Collection Time: 04/03/21  5:18 PM  
Result Value Ref Range Sodium 139 136 - 145 mmol/L Potassium 4.3 3.5 - 5.1 mmol/L Chloride 106 98 - 107 mmol/L  
 CO2 23 21 - 32 mmol/L Anion gap 10 7 - 16 mmol/L Glucose 123 (H) 65 - 100 mg/dL BUN 60 (H) 8 - 23 MG/DL Creatinine 1.64 (H) 0.6 - 1.0 MG/DL  
 GFR est AA 37 (L) >60 ml/min/1.73m2 GFR est non-AA 31 (L) >60 ml/min/1.73m2 Calcium 8.7 8.3 - 10.4 MG/DL Bilirubin, total 0.8 0.2 - 1.1 MG/DL  
 ALT (SGPT) 146 (H) 12 - 65 U/L  
 AST (SGOT) 82 (H) 15 - 37 U/L Alk. phosphatase 115 50 - 136 U/L Protein, total 6.4 6.3 - 8.2 g/dL Albumin 3.2 3.2 - 4.6 g/dL Globulin 3.2 2.3 - 3.5 g/dL A-G Ratio 1.0 (L) 1.2 - 3.5    
TROPONIN-HIGH SENSITIVITY Collection Time: 04/03/21  5:18 PM  
Result Value Ref Range Troponin-High Sensitivity 37.8 (H) 0 - 14 pg/mL NT-PRO BNP Collection Time: 04/03/21  5:18 PM  
Result Value Ref Range NT pro-BNP 2,255 (H) <450 PG/ML  
 
 
EKG Results Procedure 720 Value Units Date/Time EKG, 12 LEAD, INITIAL [[id number]] Order Status: Completed All Micro Results None Other Studies: Xr Chest Makeda Juan Antonio Result Date: 4/3/2021 PORTABLE CHEST, April 3, 2021 at 1708 hours CLINICAL HISTORY:  Shortness of breath with cyanosis. COMPARISON:  Portable chest yesterday. FINDINGS:  AP erect image demonstrates no confluent infiltrate. The heart remains enlarged with mild pulmonary venous congestion and small pleural effusions in a pattern consistent with mild congestive heart failure or fluid imbalance. No definite pneumothorax. There are overlying radiopaque support devices. NO SIGNIFICANT INTERVAL CHANGE. Medications: 
 
 
 
 
Problem List:  
 
Hospital Problems as of 4/3/2021 Date Reviewed: 3/15/2021 Codes Class Noted - Resolved POA * (Principal) Acute respiratory failure with hypoxia [facility]) ICD-10-CM: J96.01 
ICD-9-CM: 518.81  4/3/2021 - Present Yes Acute metabolic encephalopathy FIS-46-MU: G93.41 
ICD-9-CM: 348.31  4/3/2021 - Present Yes Diastolic CHF, acute on chronic (HCC) ICD-10-CM: I50.33 ICD-9-CM: 428.33, 428.0  4/3/2021 - Present Unknown Transaminitis ICD-10-CM: R74.01 
ICD-9-CM: 790.4  4/3/2021 - Present Yes Pulmonary hypertension (HCC) (Chronic) ICD-10-CM: I27.20 ICD-9-CM: 416.8  4/3/2021 - Present Yes Stage 3b chronic kidney disease ([facility] ICD-10-CM: V74.25 
ICD-9-CM: 585.3  3/16/2021 - Present Yes Moderate mitral regurgitation ICD-10-CM: I34.0 ICD-9-CM: 424.0  3/16/2021 - Present Yes Anemia ICD-10-CM: D64.9 ICD-9-CM: 285.9  3/15/2021 - Present Yes Atrial fibrillation [facility]) ICD-10-CM: I48.91 
ICD-9-CM: 427.31  10/9/2020 - Present Yes  
   
 CVA (cerebral vascular accident) ([facility] ICD-10-CM: I63.9 ICD-9-CM: 434.91  10/6/2020 - Present Yes Signed By: Dante Montgomery MD  
[employer] [facility]  April 3, 2021  7:25 PM

## 2021-04-03 NOTE — ED TRIAGE NOTES
Pt to ED by [facility] for cyanosis and shortness of breath. Pt was seen yesterday for the same c/o. Pt is from [facility] and upon [facility] arrival pt was at 90 % on unknown amount of oxygen. Pt is aox4, no signs of cyanosis at this time. [facility] did tell EMS they believe the swelling in her feet has increased. Pt was 84% on room air. Pt now 97% on 4L NC. Pt masked PTA

## 2021-04-03 NOTE — ED PROVIDER NOTES
Patient is a 80-year-old female with a history of anemia, chronic atrial fibrillation, chronic congestive heart failure, gastroesophageal reflux who sent to the emergency department today via EMS from her assisted living facility. Patient was seen by me in the emergency department yesterday for the same complaints. I had multiple prolonged discussions with patient and her family. Patient did not want to be admitted to the hospital despite her edema, volume overload and dyspnea. She was discharged back to the facility. Paramedics state that they were called when nurse checked on patient and she was cyanotic and had labored breathing. She also had some questionable unresponsiveness. On asking the patient she states she thinks she just fell asleep. Again she is not a very good historian and she is very hard of hearing. The history is provided by the patient and the EMS personnel. Shortness of Breath This is a new problem. Associated symptoms include leg swelling. Pertinent negatives include no fever, no rhinorrhea, no sore throat, no neck pain, no cough, no wheezing, no chest pain, no vomiting, no abdominal pain and no rash. She has had prior hospitalizations. She has had prior ED visits. She has had no prior ICU admissions. Associated medical issues include heart failure. Associated medical issues do not include asthma, COPD or CAD. Past Medical History:  
Diagnosis Date  Anemia  Arthritis  Atrial fibrillation ([address] 10/2020  Cerebral microvascular disease 2019 CT, MRI, Carotid U/S  
 CHF (congestive heart failure) ([address]  CVA (cerebral vascular accident) ([address] 10/6/2020  Dependent edema  GERD (gastroesophageal reflux disease)  Headache   
 ocular migraines  Inguinal hernia recurrent bilateral   
 Severe mitral regurgitation  Stasis dermatitis of both legs  Temporal arteritis ([address] right temple- empiric tx with steroids  Thyroid disease hypothyroid  TMJ click 90/3652  
 bilateral  
 
 
Past Surgical History:  
Procedure Laterality Date  HX COLONOSCOPY    
 HX ENDOSCOPY    
 HH, gastritis,GERD  
 HX GI Antibiotics cause diarrhea  HX GYN Csection  HX HEENT Bilateral Cataract  HX ORTHOPAEDIC    
 wrist surg for fracture Family History:  
Problem Relation Age of Onset  Stroke Mother Social History Socioeconomic History  Marital status:  Spouse name: Not on file  Number of children: Not on file  Years of education: Not on file  Highest education level: Not on file Occupational History  Not on file Social Needs  Financial resource strain: Not on file  Food insecurity Worry: Not on file Inability: Not on file  Transportation needs Medical: Not on file Non-medical: Not on file Tobacco Use  Smoking status: Never Smoker  Smokeless tobacco: Never Used Substance and Sexual Activity  Alcohol use: No  
 Drug use: No  
 Sexual activity: Not on file Lifestyle  Physical activity Days per week: Not on file Minutes per session: Not on file  Stress: Not on file Relationships  Social connections Talks on phone: Not on file Gets together: Not on file Attends Rastafarian service: Not on file Active member of club or organization: Not on file Attends meetings of clubs or organizations: Not on file Relationship status: Not on file  Intimate partner violence Fear of current or ex partner: Not on file Emotionally abused: Not on file Physically abused: Not on file Forced sexual activity: Not on file Other Topics Concern  Not on file Social History Narrative  Not on file ALLERGIES: Amoxicillin, Azithromycin, Ceftriaxone, Cefuroxime, Celecoxib, Dexilant [dexlansoprazole], Keflex [cephalexin], Metronidazole, Pepcid [famotidine], and Sulfamethoxazole-trimethoprim Review of Systems Constitutional: Negative for chills, fatigue and fever. HENT: Negative for congestion, rhinorrhea and sore throat. Eyes: Negative for pain, discharge and visual disturbance. Respiratory: Positive for shortness of breath. Negative for cough and wheezing. Cardiovascular: Positive for leg swelling. Negative for chest pain and palpitations. Gastrointestinal: Negative for abdominal pain, diarrhea, nausea and vomiting. Endocrine: Negative for polydipsia and polyuria. Genitourinary: Negative for dysuria, frequency and urgency. Musculoskeletal: Negative for back pain and neck pain. Skin: Negative for rash. Neurological: Negative for seizures, syncope and weakness. Hematological: Negative. There were no vitals filed for this visit. Physical Exam 
Vitals signs and nursing note reviewed. Constitutional:   
   Appearance: She is well-developed. She is ill-appearing. Comments: Hard of hearing HENT:  
   Head: Normocephalic and atraumatic. Eyes:  
   Conjunctiva/sclera: Conjunctivae normal.  
   Pupils: Pupils are equal, round, and reactive to light. Neck: Musculoskeletal: Normal range of motion and neck supple. Cardiovascular:  
   Rate and Rhythm: Normal rate. Rhythm irregular. Pulmonary:  
   Effort: Pulmonary effort is normal.  
   Breath sounds: Decreased breath sounds present. Comments: Intermittently tachypneic. Then settles down to rest. 
Chest:  
   Chest wall: No deformity, tenderness or crepitus. Abdominal:  
   Palpations: Abdomen is soft. Tenderness: There is no abdominal tenderness. There is no guarding or rebound. Musculoskeletal: Normal range of motion. General: No deformity. Right lower leg: Edema present. Left lower leg: Edema present. Lymphadenopathy:  
   Cervical: No cervical adenopathy. Skin: 
   General: Skin is dry. Coloration: Skin is pale. Findings: No rash.   
   Comments: Hands are cool to touch Neurological:  
   Mental Status: She is alert and oriented to person, place, and time. GCS: GCS eye subscore is 4. GCS verbal subscore is 5. GCS motor subscore is 6. Cranial Nerves: No cranial nerve deficit. Sensory: No sensory deficit. MDM Number of Diagnoses or Management Options Diagnosis management comments: I wore appropriate PPE throughout this patient's ED visit. Garett Aranda MD, 4:58 PM 
 
6:10 PM 
EKG reviewed by me shows chronic atrial fibrillation at a rate of 87. No acute ischemic changes. Normal axis and intervals. Chest x-ray unchanged from yesterday does show cardiomegaly and some mild interstitial edema with small effusions Hemoglobin improved to 9 Renal function slightly worse with a BUN of 60 and creatinine up to 1.6 High-sensitivity troponin 35 
proBNP actually less than yesterday at 2200 On nasal cannula oxygen patient maintaining a saturation in the 90s. She was noted to be 84% on room air on arrival here. Clinically she also appears a bit more confused than yesterday. Her son is power of  and is at the bedside. I discussed all these results with them again. They are agreeable to admission today as they had refused yesterday. Per the son she is DO NOT INTUBATE and DO NOT RESUSCITATE but they would like oxygen and admission. I discussed the case with Dr. Rakesh Sanders of the hospitalist service. Voice dictation software was used during the making of this note. This software is not perfect and grammatical and other typographical errors may be present. This note has been proofread, but may still contain errors. Garett Aranda MD; 4/3/2021 @6:12 PM  
=================================================================== Amount and/or Complexity of Data Reviewed Clinical lab tests: ordered and reviewed Tests in the radiology section of CPT®: ordered and reviewed Tests in the medicine section of CPT®: ordered Decide to obtain previous medical records or to obtain history from someone other than the patient: yes Review and summarize past medical records: yes Discuss the patient with other providers: yes Independent visualization of images, tracings, or specimens: yes Risk of Complications, Morbidity, and/or Mortality Presenting problems: moderate Diagnostic procedures: moderate Management options: moderate Patient Progress Patient progress: improved Procedures

## 2021-04-04 ENCOUNTER — HOSPICE ADMISSION (OUTPATIENT)
Dept: HOSPICE | Facility: HOSPICE | Age: 86
End: 2021-04-04

## 2021-04-04 LAB
ATRIAL RATE: 250 BPM
CALCULATED R AXIS, ECG10: 68 DEGREES
CALCULATED T AXIS, ECG11: 97 DEGREES
DIAGNOSIS, 93000: NORMAL
Q-T INTERVAL, ECG07: 328 MS
QRS DURATION, ECG06: 76 MS
QTC CALCULATION (BEZET), ECG08: 394 MS
VENTRICULAR RATE, ECG03: 87 BPM

## 2021-04-04 PROCEDURE — 65270000029 HC RM PRIVATE

## 2021-04-04 PROCEDURE — 97530 THERAPEUTIC ACTIVITIES: CPT

## 2021-04-04 PROCEDURE — 86580 TB INTRADERMAL TEST: CPT | Performed by: INTERNAL MEDICINE

## 2021-04-04 PROCEDURE — 74011250637 HC RX REV CODE- 250/637: Performed by: INTERNAL MEDICINE

## 2021-04-04 PROCEDURE — 74011000302 HC RX REV CODE- 302: Performed by: INTERNAL MEDICINE

## 2021-04-04 PROCEDURE — 97162 PT EVAL MOD COMPLEX 30 MIN: CPT

## 2021-04-04 PROCEDURE — 74011636637 HC RX REV CODE- 636/637: Performed by: INTERNAL MEDICINE

## 2021-04-04 PROCEDURE — 92610 EVALUATE SWALLOWING FUNCTION: CPT

## 2021-04-04 RX ORDER — LEVOTHYROXINE SODIUM 50 UG/1
50 TABLET ORAL
Status: DISCONTINUED | OUTPATIENT
Start: 2021-04-06 | End: 2021-04-05

## 2021-04-04 RX ORDER — SUCRALFATE 1 G/1
1 TABLET ORAL
Status: DISCONTINUED | OUTPATIENT
Start: 2021-04-04 | End: 2021-04-05

## 2021-04-04 RX ORDER — PANTOPRAZOLE SODIUM 40 MG/1
40 TABLET, DELAYED RELEASE ORAL
Status: DISCONTINUED | OUTPATIENT
Start: 2021-04-04 | End: 2021-04-05

## 2021-04-04 RX ORDER — LEVOTHYROXINE SODIUM 75 UG/1
75 TABLET ORAL
Status: DISCONTINUED | OUTPATIENT
Start: 2021-04-04 | End: 2021-04-05

## 2021-04-04 RX ORDER — LANOLIN ALCOHOL/MO/W.PET/CERES
1 CREAM (GRAM) TOPICAL
Status: DISCONTINUED | OUTPATIENT
Start: 2021-04-04 | End: 2021-04-05

## 2021-04-04 RX ORDER — ASPIRIN 81 MG/1
81 TABLET ORAL DAILY
Status: DISCONTINUED | OUTPATIENT
Start: 2021-04-04 | End: 2021-04-05

## 2021-04-04 RX ORDER — PREDNISONE 5 MG/1
5 TABLET ORAL
Status: DISCONTINUED | OUTPATIENT
Start: 2021-04-04 | End: 2021-04-05

## 2021-04-04 RX ADMIN — Medication 10 ML: at 05:34

## 2021-04-04 RX ADMIN — PANTOPRAZOLE SODIUM 40 MG: 40 TABLET, DELAYED RELEASE ORAL at 11:29

## 2021-04-04 RX ADMIN — LEVOTHYROXINE SODIUM 75 MCG: 0.07 TABLET ORAL at 11:29

## 2021-04-04 RX ADMIN — Medication 10 ML: at 21:04

## 2021-04-04 RX ADMIN — FERROUS SULFATE TAB 325 MG (65 MG ELEMENTAL FE) 325 MG: 325 (65 FE) TAB at 11:29

## 2021-04-04 RX ADMIN — TUBERCULIN PURIFIED PROTEIN DERIVATIVE 5 UNITS: 5 INJECTION, SOLUTION INTRADERMAL at 11:35

## 2021-04-04 RX ADMIN — Medication 81 MG: at 11:29

## 2021-04-04 RX ADMIN — PREDNISONE 5 MG: 5 TABLET ORAL at 11:29

## 2021-04-04 NOTE — HOSPICE
Spoke with patients son Brigitte Fallon. Brigitte Fallon states that his mom has \"rebounded\" today. Alert and oriented. Taking PO medications and eating. At this point Brigitte Fallon would like for his mother to return to [facility] (pending bed availability). We discussed Aaliyah while at [facility]. I emailed Brigitte Fallon our contact information. Will continue to follow patient until a discharge plan is established. We appreciate this referral, Macie Nayak RN BSN [facility] [phone]

## 2021-04-04 NOTE — PROGRESS NOTES
Problem: Falls - Risk of 
Goal: *Absence of Falls Description: Document Jailyn Segovia Fall Risk and appropriate interventions in the flowsheet. Outcome: Progressing Towards Goal 
Note: Fall Risk Interventions: 
Mobility Interventions: Bed/chair exit alarm, Patient to call before getting OOB, Utilize walker, cane, or other assistive device, Strengthening exercises (ROM-active/passive) Mentation Interventions: Adequate sleep, hydration, pain control, Bed/chair exit alarm, Door open when patient unattended, Evaluate medications/consider consulting pharmacy, Eyeglasses and hearing aids, Increase mobility, More frequent rounding, Toileting rounds, Update white board Medication Interventions: Bed/chair exit alarm, Evaluate medications/consider consulting pharmacy, Patient to call before getting OOB, Teach patient to arise slowly Elimination Interventions: Call light in reach, Bed/chair exit alarm, Elevated toilet seat, Patient to call for help with toileting needs, Stay With Me (per policy), Toilet paper/wipes in reach, Toileting schedule/hourly rounds History of Falls Interventions: Bed/chair exit alarm, Evaluate medications/consider consulting pharmacy, Investigate reason for fall, Utilize gait belt for transfer/ambulation, Room close to nurse's station

## 2021-04-04 NOTE — PROGRESS NOTES
Problem: Dysphagia (Adult) Goal: *Acute Goals and Plan of Care (Insert Text) Description: STG: Pt will demonstrate zero signs/sx of aspiration with regular textures with thin liquids with 90% accuracy during all meals. STG: Pt will complete a full speech, language and cognitive evaluation without assistance with 100% accuracy during session. LTG: Pt will demonstrate zero signs/sx of aspiration with least restrictive diet with 100% accuracy during all meals. Outcome: Progressing Towards Goal 
 SPEECH LANGUAGE PATHOLOGY: DYSPHAGIA- Initial Assessment NAME/AGE/GENDER: Radha Morales is a 80 y.o. female DATE: 4/4/2021 PRIMARY DIAGNOSIS: Acute respiratory failure with hypoxia ([facility] [J96.01] ICD-10: Treatment Diagnosis: R13.12 Dysphagia, Oropharyngeal Phase RECOMMENDATIONS  
DIET:  
Mechanical Soft Thin Liquids MEDICATIONS: With liquid Whole in puree Crushed in puree ASPIRATION PRECAUTIONS Slow rate of intake Small bites/sips Upright at 90 degrees during meal 
  
COMPENSATORY STRATEGIES/MODIFICATIONS None RECOMMENDATIONS for CONTINUED SPEECH THERAPY:  
YES: Anticipate need for ongoing speech therapy during this hospitalization. ASSESSMENT Patient presents with oropharyngeal swallow function that is within normal limits. No s/sx of dysphagia identified. . Recommend diet tolerance x 1. ?ST evaluation verse baseline. Recommend mechanical soft/thin with medication as tolerated EDUCATION: 
Recommendations discussed with RN 
CONTINUATION OF SKILLED SERVICES/MEDICAL NECESSITY: 
Patient is expected to demonstrate progress in  diet tolerance in order to  decrease aspiration risk. REHABILITATION POTENTIAL FOR STATED GOALS: Fair PLAN   
FREQUENCY/DURATION: Continue to follow patient 1 time for diet tolerance - Recommendations for next treatment session: Next treatment session will address diet tolerance ? ST evaluation SUBJECTIVE  
confused Pain: Pain Scale 1: Numeric (0 - 10) Pain Intensity 1: 0 History of Present Injury/Illness: Ms. Ross Worthy  has a past medical history of Anemia, Arthritis, Atrial fibrillation ([facility] (10/2020), Cerebral microvascular disease (2019), CHF (congestive heart failure) ([facility] CVA (cerebral vascular accident) ([facility] (10/6/2020), Dependent edema, GERD (gastroesophageal reflux disease), Headache, Inguinal hernia recurrent bilateral, Severe mitral regurgitation, Stasis dermatitis of both legs, Temporal arteritis ([facility] Thyroid disease, and TMJ click (50/2551). She also has no past medical history of CAD (coronary artery disease) or Cancer ([facility] . She also  has a past surgical history that includes hx gyn; hx heent; hx orthopaedic; hx endoscopy; hx colonoscopy; and hx gi. PRECAUTIONS/ALLERGIES: Amoxicillin, Azithromycin, Ceftriaxone, Cefuroxime, Celecoxib, Dexilant [dexlansoprazole], Keflex [cephalexin], Metronidazole, Pepcid [famotidine], and Sulfamethoxazole-trimethoprim Problem List:  (Impairments causing functional limitations): 
dysphagia Previous Dysphagia: NONE REPORTED Diet Prior to Evaluation: GI soft Orientation: 
Person Cognitive-Linguistic Screening: 
Alertness Drowsy, but arousable with minimal cues Speech Production:  
confused Expressive Language: 
confused Receptive Language: Follows commands Cognition:  
? baseline Prior Level of Function: unknown Recommendations: Given results of screening, patient appears to be functioning at baseline. No acute assessment of speech, language, or cognition warranted. OBJECTIVE Oral Motor:  
Labial: No impairment Dentition: Natural 
Oral Hygiene: Adequate Lingual: No impairment Dysphagia evaluation: 
 Patient consumed trials of thin via cup and straw, pureed, mixed and solid with no overt signs/sx of aspiration. Slow mastication. Timely swallow. Minimal oral residue. Recommend diet tolerance x 1. Tool Used: Dysphagia Outcome and Severity Scale (BASILIA) Score Comments Normal Diet  [] 7 With no strategies or extra time needed Functional Swallow  [x] 6 May have mild oral or pharyngeal delay Mild Dysphagia  [] 5 Which may require one diet consistency restricted Mild-Moderate Dysphagia  [] 4 With 1-2 diet consistencies restricted Moderate Dysphagia  [] 3 With 2 or more diet consistencies restricted Moderate-Severe Dysphagia  [] 2 With partial PO strategies (trials with ST only) Severe Dysphagia  [] 1 With inability to tolerate any PO safely Score:  Initial: 6 Most Recent:  (Date 04/04/21 ) Interpretation of Tool: The Dysphagia Outcome and Severity Scale (BASILIA) is a simple, easy-to-use, 7-point scale developed to systematically rate the functional severity of dysphagia based on objective assessment and make recommendations for diet level, independence level, and type of nutrition. Current Medications: No current facility-administered medications on file prior to encounter. Current Outpatient Medications on File Prior to Encounter Medication Sig Dispense Refill  
 ferrous sulfate 325 mg (65 mg iron) tablet Take 1 Tab by mouth daily (with breakfast). 30 Tab 1  
 pantoprazole (PROTONIX) 40 mg tablet Take 1 Tab by mouth Before breakfast and dinner. 60 Tab 1  
 sucralfate (CARAFATE) 1 gram tablet Take 1 Tab by mouth Before breakfast, lunch, dinner and at bedtime. 60 Tab 1  
 aspirin delayed-release 81 mg tablet Take 1 Tab by mouth daily. 30 Tab 0  
 levothyroxine (SYNTHROID) 50 mcg tablet Take 1 tablet every Tuesday and Thursday. 24 Tab 5  
 levothyroxine (SYNTHROID) 75 mcg tablet [id number] tablet by mouth 5 days a week. On Tuesday and Thursday only takes 50mcg tablet. Indications: a condition with low thyroid hormone levels 60 Tab 5  
 predniSONE (DELTASONE) 5 mg tablet Take 1 Tab by mouth daily. 90 Tab 2 INTERDISCIPLINARY COLLABORATION: RN After treatment position/precautions: 
Upright in bed RN notified Total Treatment Duration:  
Time In: 1430 Time Out: 1440  Taffy Ripple MA/CCC/SLP

## 2021-04-04 NOTE — PROGRESS NOTES
[facility] Hospitalist Progress Note Name:  Radha Morales  Age:89 y.o. Sex:female :  1920 MRN:  [id number] Admit Date:  4/3/2021 Reason for Admission: 
Acute respiratory failure with hypoxia ([address] [J96.01] Assessment & Plan  
 
  
Acute on chronic diastolic CHF exacerbation, acute hypoxic respiratory failure, severe mitral regurgitation, pulmonary HTN: 
Resume home meds Diet and SLP consult PT,OT 
PPD Case management for discharge planning O2 to wean as tolerant  
  
  
  
Disposition: pending Diet: DIET NPO 
VTE ppx: none GI ppx: none CODE STATUS: Prior Surrogate decision-maker: Trina Frankel [phone] Dispo/Discharge Planning: pending Hospital Course/Subjective:  
 
Radha Morales is a 80 y.o. female with medical history of  chronic diastolic CHF, acute hypoxic respiratory failure, severe mitral regurgitation, pulmonary HTN, anemia, AFIB, CVA, CKD3,  who presented to ED with hypoxia and shortness of breath. She lives independently at [address] [place] [address] and was seen in the ED for shortness of breath and lower O2 sats on 21. She and her son refused admit and returned to home. She was found confused with lower O2 sats  and returns to the ED. CXR shows pulmonary edema. Her O2 sats are difficult to obtain due to peripheral extremity coldness. She was  more confused than her baseline status per her son Harmony Saldana at bedside. He  Requested comfort care and hospice as he feels she will continue to decline. She was admitted for hospice house evaluation. During her 3,2021 admit she was seen by cardiology who recommended no aggressive workup given her advanced age and frailty. On 21 she is alert and oriented x 4 and asking for food. She understands that she likely will continued to decline and will discuss course with her son when he arrives. She is trying to ambulate and is unsteady. No shelby dyspnea.   
 
I spent a combined 15 minutes with patient and then on phone with son Jolynn Frazier discussing goals. Plans are to see if can get her to [facility] on hospice. Objective:  
 
Patient Vitals for the past 24 hrs: 
 Temp Pulse Resp BP SpO2  
04/04/21 0742 98.1 °F (36.7 °C) 79 26 (!) 148/86 92 % 04/04/21 0321 98.3 °F (36.8 °C) 80 26 139/60 92 % 04/03/21 2319 98.5 °F (36.9 °C) 99 22 (!) 171/79 99 % 04/03/21 1930  93 29 (!) 175/87 (!) 77 % 04/03/21 1830  84  (!) 177/86 (!) 75 % 04/03/21 1736  78  (!) 195/77   
04/03/21 1654  75  (!) 176/78 (!) 87 % 04/03/21 1653 96.8 °F (36 °C) 76 24 (!) 176/78 90 % Oxygen Therapy O2 Sat (%): 92 % (04/04/21 0742) Pulse via Oximetry: 72 beats per minute (04/03/21 1930) O2 Device: Nasal cannula (04/03/21 2319) O2 Flow Rate (L/min): 5 l/min (04/03/21 2319) Body mass index is 22.17 kg/m². Physical Exam:  
General: No acute distress, speaking in full sentences, no use of accessory muscles, elderly and frail Lungs: Clear to auscultation bilaterally anterior Cardiovascular: Regular rate and rhythm with normal S1 and S2, 1+ edema Abdomen: Soft, nontender, nondistended, normoactive bowel sounds Extremities: No cyanosis Neuro: Nonfocal, A&O x3  
Psych: Normal affect Data Review: 
I have reviewed all labs, meds, and studies from the last 24 hours: 
 
Labs: 
 
Recent Results (from the past 24 hour(s)) EKG, 12 LEAD, INITIAL Collection Time: 04/03/21  5:12 PM  
Result Value Ref Range Ventricular Rate 87 BPM  
 Atrial Rate 250 BPM  
 QRS Duration 76 ms  
 Q-T Interval 328 ms QTC Calculation (Bezet) 394 ms Calculated R Axis 68 degrees Calculated T Axis 97 degrees Diagnosis    
  !! AGE AND GENDER SPECIFIC ECG ANALYSIS !! Atrial fibrillation T wave abnormality, consider inferior ischemia or digitalis effect Abnormal ECG When compared with ECG of 02-APR-2021 16:17, 
T wave inversion now evident in Inferior leads Nonspecific T wave abnormality now evident in Lateral leads QT has shortened CBC WITH AUTOMATED DIFF Collection Time: 04/03/21  5:18 PM  
Result Value Ref Range WBC 11.3 (H) 4.3 - 11.1 K/uL  
 RBC 4.26 4.05 - 5.2 M/uL HGB 9.0 (L) 11.7 - 15.4 g/dL HCT 30.4 (L) 35.8 - 46.3 % MCV 71.4 (L) 79.6 - 97.8 FL  
 MCH 21.1 (L) 26.1 - 32.9 PG  
 MCHC 29.6 (L) 31.4 - 35.0 g/dL RDW 24.1 (H) 11.9 - 14.6 % PLATELET 498 520 - 467 K/uL MPV 10.2 9.4 - 12.3 FL ABSOLUTE NRBC 0.08 0.0 - 0.2 K/uL  
 DF AUTOMATED NEUTROPHILS 87 (H) 43 - 78 % LYMPHOCYTES 6 (L) 13 - 44 % MONOCYTES 6 4.0 - 12.0 % EOSINOPHILS 0 (L) 0.5 - 7.8 % BASOPHILS 0 0.0 - 2.0 % IMMATURE GRANULOCYTES 0 0.0 - 5.0 %  
 ABS. NEUTROPHILS 9.8 (H) 1.7 - 8.2 K/UL  
 ABS. LYMPHOCYTES 0.7 0.5 - 4.6 K/UL  
 ABS. MONOCYTES 0.7 0.1 - 1.3 K/UL  
 ABS. EOSINOPHILS 0.0 0.0 - 0.8 K/UL  
 ABS. BASOPHILS 0.0 0.0 - 0.2 K/UL  
 ABS. IMM. GRANS. 0.0 0.0 - 0.5 K/UL METABOLIC PANEL, COMPREHENSIVE Collection Time: 04/03/21  5:18 PM  
Result Value Ref Range Sodium 139 136 - 145 mmol/L Potassium 4.3 3.5 - 5.1 mmol/L Chloride 106 98 - 107 mmol/L  
 CO2 23 21 - 32 mmol/L Anion gap 10 7 - 16 mmol/L Glucose 123 (H) 65 - 100 mg/dL BUN 60 (H) 8 - 23 MG/DL Creatinine 1.64 (H) 0.6 - 1.0 MG/DL  
 GFR est AA 37 (L) >60 ml/min/1.73m2 GFR est non-AA 31 (L) >60 ml/min/1.73m2 Calcium 8.7 8.3 - 10.4 MG/DL Bilirubin, total 0.8 0.2 - 1.1 MG/DL  
 ALT (SGPT) 146 (H) 12 - 65 U/L  
 AST (SGOT) 82 (H) 15 - 37 U/L Alk. phosphatase 115 50 - 136 U/L Protein, total 6.4 6.3 - 8.2 g/dL Albumin 3.2 3.2 - 4.6 g/dL Globulin 3.2 2.3 - 3.5 g/dL A-G Ratio 1.0 (L) 1.2 - 3.5    
TROPONIN-HIGH SENSITIVITY Collection Time: 04/03/21  5:18 PM  
Result Value Ref Range Troponin-High Sensitivity 37.8 (H) 0 - 14 pg/mL NT-PRO BNP Collection Time: 04/03/21  5:18 PM  
Result Value Ref Range NT pro-BNP 2,255 (H) <450 PG/ML All Micro Results None EKG Results  Procedure 720 Value Units Date/Time EKG, 12 LEAD, INITIAL [[id number]] Collected: 04/03/21 1712 Order Status: Completed Updated: 04/04/21 3622 Ventricular Rate 87 BPM   
  Atrial Rate 250 BPM   
  QRS Duration 76 ms   
  Q-T Interval 328 ms QTC Calculation (Bezet) 394 ms Calculated R Axis 68 degrees Calculated T Axis 97 degrees Diagnosis --  
  !! AGE AND GENDER SPECIFIC ECG ANALYSIS !! Atrial fibrillation T wave abnormality, consider inferior ischemia or digitalis effect Abnormal ECG When compared with ECG of 02-APR-2021 16:17, 
T wave inversion now evident in Inferior leads Nonspecific T wave abnormality now evident in Lateral leads QT has shortened Other Studies: Xr Chest [facility] Result Date: 4/3/2021 PORTABLE CHEST, April 3, 2021 at 1708 hours CLINICAL HISTORY:  Shortness of breath with cyanosis. COMPARISON:  Portable chest yesterday. FINDINGS:  AP erect image demonstrates no confluent infiltrate. The heart remains enlarged with mild pulmonary venous congestion and small pleural effusions in a pattern consistent with mild congestive heart failure or fluid imbalance. No definite pneumothorax. There are overlying radiopaque support devices. NO SIGNIFICANT INTERVAL CHANGE. Current Meds:  
Current Facility-Administered Medications Medication Dose Route Frequency  tuberculin injection 5 Units  5 Units IntraDERMal ONCE  
 ferrous sulfate tablet 325 mg  1 Tab Oral DAILY WITH BREAKFAST  pantoprazole (PROTONIX) tablet 40 mg  40 mg Oral ACB  sucralfate (CARAFATE) tablet 1 g  1 g Oral AC&HS  [START ON 4/6/2021] levothyroxine (SYNTHROID) tablet 50 mcg  50 mcg Oral EVERY TU & TH  
 levothyroxine (SYNTHROID) tablet 75 mcg  75 mcg Oral Once per day on Sun Mon Wed Fri Sat  predniSONE (DELTASONE) tablet 5 mg  5 mg Oral DAILY WITH BREAKFAST  aspirin delayed-release tablet 81 mg  81 mg Oral DAILY  sodium chloride (NS) flush 5-40 mL  5-40 mL IntraVENous Q8H  sodium chloride (NS) flush 5-40 mL  5-40 mL IntraVENous PRN  
 acetaminophen (TYLENOL) tablet 650 mg  650 mg Oral Q6H PRN Or  
 acetaminophen (TYLENOL) suppository 650 mg  650 mg Rectal Q6H PRN  
 ondansetron (ZOFRAN) injection 4 mg  4 mg IntraVENous Q6H PRN  
 morphine injection 1 mg  1 mg IntraVENous Q4H PRN Problem List: 
Hospital Problems as of 4/4/2021 Date Reviewed: 3/15/2021 Codes Class Noted - Resolved POA * (Principal) Acute respiratory failure with hypoxia [facility]) ICD-10-CM: J96.01 
ICD-9-CM: 518.81  4/3/2021 - Present Yes Acute metabolic encephalopathy SAEID-35-ES: G93.41 
ICD-9-CM: 348.31  4/3/2021 - Present Yes Diastolic CHF, acute on chronic (HCC) ICD-10-CM: I50.33 ICD-9-CM: 428.33, 428.0  4/3/2021 - Present Unknown Transaminitis ICD-10-CM: R74.01 
ICD-9-CM: 790.4  4/3/2021 - Present Yes Pulmonary hypertension (HCC) (Chronic) ICD-10-CM: I27.20 ICD-9-CM: 416.8  4/3/2021 - Present Yes Stage 3b chronic kidney disease ([facility] ICD-10-CM: Q55.80 
ICD-9-CM: 585.3  3/16/2021 - Present Yes Moderate mitral regurgitation ICD-10-CM: I34.0 ICD-9-CM: 424.0  3/16/2021 - Present Yes Anemia ICD-10-CM: D64.9 ICD-9-CM: 285.9  3/15/2021 - Present Yes Atrial fibrillation [facility]) ICD-10-CM: I48.91 
ICD-9-CM: 427.31  10/9/2020 - Present Yes  
   
 CVA (cerebral vascular accident) ([facility] ICD-10-CM: I63.9 ICD-9-CM: 434.91  10/6/2020 - Present Yes Part of this note was written by using a voice dictation software and the note has been proof read but may still contain some grammatical/other typographical errors. Signed By: Desiree Tiwari MD  
[facility] Hospitalist Service  April 4, 2021  11:45 AM

## 2021-04-04 NOTE — PROGRESS NOTES
Patient received from ED. Patient is alert and oriented x4. Bowel sounds active in all 4 quadrants, she is tachyphemic with use of abdominal muscles and on 5L oxygen via NC. Breath sounds are diminished bilaterally. Patient has pitting edema +3 bilaterally in lower extremities. Some discoloration and scars were noted in lower extremities. Radial and pedal pulses are present and palpable bilaterally. Patient has redness and some skin breakdown in her sacrum/coccyx, Allevyn dressing applied to area. Patient is NPO and on bedrest, incontinence brief on. IV site c/d/i/capped. Patient denies pain and all other needs met at this time. Safety measures in place. Bed low and locked, side rails up x3, call light and side table within reach. Instructed patient to call for assistance when trying to get out of bed or for anything else she needs, patient stated she understands. 04/03/21 2100 Dual Skin Pressure Injury Assessment Dual Skin Pressure Injury Assessment X Second Care Provider (Based on [address] [place] [address]) EDWARDO Dexter Skin Integumentary Skin Integumentary (WDL) X

## 2021-04-04 NOTE — ED NOTES
TRANSFER - OUT REPORT: 
 
Verbal report given to Brock Son on Tomas Keating  being transferred to [id number] [facility] for routine progression of care Report consisted of patients Situation, Background, Assessment and  
Recommendations(SBAR). Information from the following report(s) SBAR, Kardex and Recent Results was reviewed with the receiving nurse. Lines:  
Peripheral IV 04/03/21 Right Antecubital (Active) Site Assessment Clean, dry, & intact 04/03/21 1720 Phlebitis Assessment 0 04/03/21 1720 Infiltration Assessment 0 04/03/21 1720 Dressing Status Clean, dry, & intact 04/03/21 1720 Hub Color/Line Status Pink 04/03/21 1720 Alcohol Cap Used No 04/03/21 1720 Opportunity for questions and clarification was provided. Patient transported with: 
 O2 @ 4 liters Tech

## 2021-04-04 NOTE — PROGRESS NOTES
Pt in bed resting, bed low and locked, call light within reach, gripper socks on, side rails up x3. Shift assessment complete, pt alert and oriented x4, pt unhappy about NPO status. no needs at this time, will continue to monitor.

## 2021-04-04 NOTE — PROGRESS NOTES
Problem: Mobility Impaired (Adult and Pediatric) Goal: *Acute Goals and Plan of Care (Insert Text) Problem: Mobility Impaired (Adult and Pediatric) Goal: *Acute Goals and Plan of Care (Insert Text) Description: LTG: 
(1.)Ms. Luis Gallego will move from supine to sit and sit to supine  in bed with SUPERVISION within 5 treatment day(s). (2.)Ms. Luis Gallego will transfer from bed to chair and chair to bed with SUPERVISION using the least restrictive device within 5 treatment day(s). (3.)Ms. Luis Gallego will ambulate with SUPERVISION for 100 feet with the least restrictive device within 5 treatment day(s). ________________________________________________________________________________________________ Outcome: Progressing Towards Goal 
  
PHYSICAL THERAPY: Initial Assessment and PM 4/4/2021 INPATIENT: PT Visit Days : 1 [place] / Product Type: Medicare /   
  
NAME/AGE/GENDER: Toyin Garcias is a 80 y.o. female PRIMARY DIAGNOSIS: Acute respiratory failure with hypoxia (HCC) [J96.01] Acute respiratory failure with hypoxia (HCC) Acute respiratory failure with hypoxia (HCC) ICD-10: Treatment Diagnosis:  
 Generalized Muscle Weakness (M62.81) Difficulty in walking, Not elsewhere classified (R26.2) Precaution/Allergies: 
Amoxicillin, Azithromycin, Ceftriaxone, Cefuroxime, Celecoxib, Dexilant [dexlansoprazole], Keflex [cephalexin], Metronidazole, Pepcid [famotidine], and Sulfamethoxazole-trimethoprim ASSESSMENT:  
 
Ms. Luis Gallego admitted with above diagnosis. Patient demonstrates generalized weakness and decreased activity tolerance which limits her independence with mobility. Patient was living independently at [facility] and ambulating with a rollator. Therapy ordered as patient was wanting to walk and having difficulty getting around in the room.   Spoke with MD and clarified that patient is comfort measures at this time and plan is for d/c to a SNF with hospice services, not STR. Will plan to continue to check on patient during admission and offer therapy services and opportunities to walk. She is pleasant but a bit confused. Suggested family bring her rollator to make mobility a bit easier as she struggled with directing the RW. This section established at most recent assessment PROBLEM LIST (Impairments causing functional limitations): 
Decreased Strength Decreased ADL/Functional Activities Decreased Transfer Abilities Decreased Ambulation Ability/Technique Decreased Balance Decreased Activity Tolerance Increased Shortness of Breath Decreased Cognition INTERVENTIONS PLANNED: (Benefits and precautions of physical therapy have been discussed with the patient.) Balance Exercise Bed Mobility Family Education Gait Training Home Exercise Program (HEP) Therapeutic Activites Therapeutic Exercise/Strengthening TREATMENT PLAN: Frequency/Duration: daily for duration of hospital stay Rehabilitation Potential For Stated Goals: Good REHAB RECOMMENDATIONS (at time of discharge pending progress):   
Placement: It is my opinion, based on this patient's performance to date, that Ms. Va Flores may benefit from d/c to SNF for hospice services. Equipment:  
None at this time HISTORY:  
History of Present Injury/Illness (Reason for Referral): Otilio Anthony is a 80 y.o. female with medical history of  chronic diastolic CHF, acute hypoxic respiratory failure, severe mitral regurgitation, pulmonary HTN, anemia, AFIB, CVA, CKD3,  who presented to ED with hypoxia and shortness of breath. She lives independently at [facility] and was seen in the ED for shortness of breath and lower O2 sats on 4-2-21. She and her son refused admit and returned to home. She was found confused with lower O2 sats today and returns to the ED. CXR shows pulmonary edema. Her O2 sats are difficult to obtain.  She is more confused than her baseline status per her son Gibran Johnson at bedside. He is requesting comfort care and hospice as he feels she will continue to decline. I also discussed care with Dr. Nalini Murcia of the ED. Past Medical History/Comorbidities: Ms. Kieran Weir  has a past medical history of Anemia, Arthritis, Atrial fibrillation ([address] (10/2020), Cerebral microvascular disease (2019), CHF (congestive heart failure) ([address] CVA (cerebral vascular accident) ([address] (10/6/2020), Dependent edema, GERD (gastroesophageal reflux disease), Headache, Inguinal hernia recurrent bilateral, Severe mitral regurgitation, Stasis dermatitis of both legs, Temporal arteritis ([address] Thyroid disease, and TMJ click (87/0724). She also has no past medical history of CAD (coronary artery disease) or Cancer ([address] Ms. Kieran Weir  has a past surgical history that includes hx gyn; hx heent; hx orthopaedic; hx endoscopy; hx colonoscopy; and hx gi. Social History/Living Environment:  
Home Environment: Independent living([place]) One/Two Story Residence: One story Living Alone: Yes Support Systems: Child(luma), Family member(s) Patient Expects to be Discharged to[de-identified] Skilled nursing facility(possibly under hospice) Current DME Used/Available at Home: [employer], rollator Prior Level of Function/Work/Activity: 
Living I and ambulating with rollator. Number of Personal Factors/Comorbidities that affect the Plan of Care: 1-2: MODERATE COMPLEXITY EXAMINATION:  
Most Recent Physical Functioning:  
Gross Assessment: 
AROM: Generally decreased, functional 
Strength: Generally decreased, functional 
Coordination: Generally decreased, functional 
         
  
Posture: 
Posture (WDL): Exceptions to [facility] Posture Assessment: Forward head, Rounded shoulders Balance: 
Sitting: Intact Standing: With support Bed Mobility: 
Supine to Sit: Stand-by assistance Sit to Supine: Stand-by assistance Wheelchair Mobility: 
  
Transfers: 
Sit to Stand: Contact guard assistance Stand to Sit: Contact guard assistance Gait: 
  
Base of Support: Center of gravity altered Speed/Brenda: Pace decreased (<100 feet/min) Step Length: Left shortened;Right shortened Gait Abnormalities: Decreased step clearance Distance (ft): 25 Feet (ft) Assistive Device: Walker, rolling Ambulation - Level of Assistance: Minimal assistance(mainly to direct walker) Interventions: Manual cues; Safety awareness training;Verbal cues Body Structures Involved: Muscles Body Functions Affected: Movement Related Activities and Participation Affected: Mobility Number of elements that affect the Plan of Care: 3: MODERATE COMPLEXITY CLINICAL PRESENTATION:  
Presentation: Evolving clinical presentation with changing clinical characteristics: MODERATE COMPLEXITY CLINICAL DECISION MAKING:  
[facility] AM-PAC 6 Clicks Basic Mobility Inpatient Short Form How much difficulty does the patient currently have. .. Unable A Lot A Little None 1. Turning over in bed (including adjusting bedclothes, sheets and blankets)? [] 1   [] 2   [x] 3   [] 4  
2. Sitting down on and standing up from a chair with arms ( e.g., wheelchair, bedside commode, etc.)   [] 1   [] 2   [x] 3   [] 4  
3. Moving from lying on back to sitting on the side of the bed? [] 1   [] 2   [x] 3   [] 4 How much help from another person does the patient currently need. .. Total A Lot A Little None 4. Moving to and from a bed to a chair (including a wheelchair)? [] 1   [] 2   [x] 3   [] 4  
5. Need to walk in hospital room? [] 1   [] 2   [x] 3   [] 4  
6. Climbing 3-5 steps with a railing? [] 1   [] 2   [x] 3   [] 4  
© 2007, Trustees of [facility], under license to [employer]. All rights reserved Score:  Initial: 18 Most Recent: X (Date: -- ) Interpretation of Tool:  Represents activities that are increasingly more difficult (i.e. Bed mobility, Transfers, Gait).  
 
Medical Necessity:    
Patient is expected to demonstrate progress in strength, balance, and coordination 
 to  
increase independence with mobility. . 
Reason for Services/Other Comments: 
Patient continues to require skilled intervention due to Decreased strength and activity tolerance affecting mobility. .  
Use of outcome tool(s) and clinical judgement create a POC that gives a: Questionable prediction of patient's progress: MODERATE COMPLEXITY  
  
 
 
 
TREATMENT:  
(In addition to Assessment/Re-Assessment sessions the following treatments were rendered) Pre-treatment Symptoms/Complaints:  Patient agreeable. Pain: Initial:  
Pain Intensity 1: 0  Post Session:  0 Therapeutic Activity: (    10 minutes): Therapeutic activities including Ambulation on level ground and lower body dressing to improve mobility, strength, balance, and coordination. Required minimal Manual cues; Safety awareness training;Verbal cues to promote static and dynamic balance in standing and coordination of RW . Braces/Orthotics/Lines/Etc:  
O2 Device: Nasal cannula Treatment/Session Assessment:   
Response to Treatment:  Patient pleasant and participated well. A bit confused. Interdisciplinary Collaboration:  
Physical Therapist 
Registered Nurse Physician After treatment position/precautions:  
Supine in bed Bed/Chair-wheels locked Call light within reach RN notified Compliance with Program/Exercises: Will assess as treatment progresses Recommendations/Intent for next treatment session: \"Next visit will focus on reduction in assistance provided\". Total Treatment Duration: PT Patient Time In/Time Out Time In: 0106 Time Out: 3035 Carmen Meng PT

## 2021-04-04 NOTE — PROGRESS NOTES
Care Management Interventions PCP Verified by CM: Yes Transition of Care Consult (CM Consult): Hospice House Current Support Network: Assisted Living The Plan for Transition of Care is Related to the Following Treatment Goals : Comfort Care The Patient and/or Patient Representative was Provided with a Choice of Provider and Agrees with the Discharge Plan?: Yes Freedom of Choice List was Provided with Basic Dialogue that Supports the Patient's Individualized Plan of Care/Goals, Treatment Preferences and Shares the Quality Data Associated with the Providers?: Yes Discharge Location Discharge Placement: Home with hospice Referral made in [address] [place] [address] and notified Candance Gimenez with [facility].

## 2021-04-05 LAB
COVID-19 RAPID TEST, COVR: NOT DETECTED
MM INDURATION POC: 0 MM (ref 0–5)
PPD POC: NEGATIVE NEGATIVE
SARS-COV-2, COV2: NORMAL
SOURCE, COVRS: NORMAL

## 2021-04-05 PROCEDURE — 94760 N-INVAS EAR/PLS OXIMETRY 1: CPT

## 2021-04-05 PROCEDURE — 74011250637 HC RX REV CODE- 250/637: Performed by: INTERNAL MEDICINE

## 2021-04-05 PROCEDURE — 97165 OT EVAL LOW COMPLEX 30 MIN: CPT

## 2021-04-05 PROCEDURE — 65270000029 HC RM PRIVATE

## 2021-04-05 PROCEDURE — 97530 THERAPEUTIC ACTIVITIES: CPT

## 2021-04-05 PROCEDURE — U0005 INFEC AGEN DETEC AMPLI PROBE: HCPCS

## 2021-04-05 PROCEDURE — 97535 SELF CARE MNGMENT TRAINING: CPT

## 2021-04-05 PROCEDURE — 87635 SARS-COV-2 COVID-19 AMP PRB: CPT

## 2021-04-05 RX ADMIN — PANTOPRAZOLE SODIUM 40 MG: 40 TABLET, DELAYED RELEASE ORAL at 05:21

## 2021-04-05 RX ADMIN — Medication 10 ML: at 05:20

## 2021-04-05 NOTE — PROGRESS NOTES
Problem: Mobility Impaired (Adult and Pediatric) Goal: *Acute Goals and Plan of Care (Insert Text) Problem: Mobility Impaired (Adult and Pediatric) Goal: *Acute Goals and Plan of Care (Insert Text) Description: LTG: 
(1.)Ms. Steve Camilo will move from supine to sit and sit to supine  in bed with SUPERVISION within 5 treatment day(s). (2.)Ms. Steve Camilo will transfer from bed to chair and chair to bed with SUPERVISION using the least restrictive device within 5 treatment day(s). (3.)Ms. Steve Camilo will ambulate with SUPERVISION for 100 feet with the least restrictive device within 5 treatment day(s). ________________________________________________________________________________________________ Outcome: Progressing Towards Goal 
  
PHYSICAL THERAPY: Daily Note and PM 4/5/2021 INPATIENT: PT Visit Days : 1 [place] / Product Type: Medicare /   
  
NAME/AGE/GENDER: Andrzej Golden is a 80 y.o. female PRIMARY DIAGNOSIS: Acute respiratory failure with hypoxia (HCC) [J96.01] Acute respiratory failure with hypoxia (HCC) Acute respiratory failure with hypoxia (HCC) ICD-10: Treatment Diagnosis:  
 · Generalized Muscle Weakness (M62.81) · Difficulty in walking, Not elsewhere classified (R26.2) Precaution/Allergies: 
Amoxicillin, Azithromycin, Ceftriaxone, Cefuroxime, Celecoxib, Dexilant [dexlansoprazole], Keflex [cephalexin], Metronidazole, Pepcid [famotidine], and Sulfamethoxazole-trimethoprim ASSESSMENT:  
 
Ms. Steve Camilo admitted with above diagnosis. Patient demonstrates generalized weakness and decreased activity tolerance which limits her independence with mobility. Patient was living independently at [facility] and ambulating with a rollator. Therapy ordered as patient was wanting to walk and having difficulty getting around in the room.   Spoke with MD and clarified that patient is comfort measures at this time and plan is for d/c to a SNF with hospice services, not STR. Will plan to continue to check on patient during admission and offer therapy services and opportunities to walk. She is pleasant but a bit confused. Suggested family bring her rollator to make mobility a bit easier as she struggled with directing the RW. 
4/5--Pt performed supine to sit. Pt performed exercises sitting edge of bed. Pt performed sit to stand. Pt took steps to head of bed. Pt performed sit to supine. Pt supine with needs in reach. This section established at most recent assessment PROBLEM LIST (Impairments causing functional limitations): 1. Decreased Strength 2. Decreased ADL/Functional Activities 3. Decreased Transfer Abilities 4. Decreased Ambulation Ability/Technique 5. Decreased Balance 6. Decreased Activity Tolerance 7. Increased Shortness of Breath 8. Decreased Cognition INTERVENTIONS PLANNED: (Benefits and precautions of physical therapy have been discussed with the patient.) 1. Balance Exercise 2. Bed Mobility 3. Family Education 4. Gait Training 5. Home Exercise Program (HEP) 6. Therapeutic Activites 7. Therapeutic Exercise/Strengthening TREATMENT PLAN: Frequency/Duration: daily for duration of hospital stay Rehabilitation Potential For Stated Goals: Good REHAB RECOMMENDATIONS (at time of discharge pending progress):   
Placement: It is my opinion, based on this patient's performance to date, that Ms. Suzan Eric may benefit from d/c to SNF for hospice services. Equipment:  
 None at this time HISTORY:  
History of Present Injury/Illness (Reason for Referral): Kelsey Fuentes is a 80 y.o. female with medical history of  chronic diastolic CHF, acute hypoxic respiratory failure, severe mitral regurgitation, pulmonary HTN, anemia, AFIB, CVA, CKD3,  who presented to ED with hypoxia and shortness of breath.  She lives independently at [facility] and was seen in the ED for shortness of breath and lower O2 sats on 4-2-21. She and her son refused admit and returned to home. She was found confused with lower O2 sats today and returns to the ED. CXR shows pulmonary edema. Her O2 sats are difficult to obtain. She is more confused than her baseline status per her son Qamar Dow at bedside. He is requesting comfort care and hospice as he feels she will continue to decline. I also discussed care with Dr. Gely Pacheco of the ED. Past Medical History/Comorbidities: Ms. Scarlett Humphrey  has a past medical history of Anemia, Arthritis, Atrial fibrillation ([address] (10/2020), Cerebral microvascular disease (2019), CHF (congestive heart failure) ([address] CVA (cerebral vascular accident) ([address] (10/6/2020), Dependent edema, GERD (gastroesophageal reflux disease), Headache, Inguinal hernia recurrent bilateral, Severe mitral regurgitation, Stasis dermatitis of both legs, Temporal arteritis ([address] Thyroid disease, and TMJ click (96/1869). She also has no past medical history of CAD (coronary artery disease) or Cancer ([address] Ms. Scarlett Humphrey  has a past surgical history that includes hx gyn; hx heent; hx orthopaedic; hx endoscopy; hx colonoscopy; and hx gi. Social History/Living Environment:  
Home Environment: Independent living([place]) One/Two Story Residence: One story Living Alone: Yes Support Systems: Child(luma), Family member(s) Patient Expects to be Discharged to[de-identified] Skilled nursing facility(possibly under hospice) Current DME Used/Available at Home: [employer] Mohs, rollator Prior Level of Function/Work/Activity: 
Living I and ambulating with rollator. Number of Personal Factors/Comorbidities that affect the Plan of Care: 1-2: MODERATE COMPLEXITY EXAMINATION:  
Most Recent Physical Functioning:  
Gross Assessment: 
  
         
  
Posture: 
  
Balance: 
Sitting: Intact Standing: Intact Bed Mobility: 
Supine to Sit: Supervision Sit to Supine: Supervision Wheelchair Mobility: 
  
Transfers: 
Sit to Stand: Contact guard assistance Stand to Sit: Contact guard assistance Gait: 
  
Distance (ft): 5 Feet (ft)(steps to head of bed) Ambulation - Level of Assistance: Contact guard assistance Body Structures Involved: 1. Muscles Body Functions Affected: 1. Movement Related Activities and Participation Affected: 1. Mobility Number of elements that affect the Plan of Care: 3: MODERATE COMPLEXITY CLINICAL PRESENTATION:  
Presentation: Evolving clinical presentation with changing clinical characteristics: MODERATE COMPLEXITY CLINICAL DECISION MAKING:  
[facility] AM-PAC 6 Clicks Basic Mobility Inpatient Short Form How much difficulty does the patient currently have. .. Unable A Lot A Little None 1. Turning over in bed (including adjusting bedclothes, sheets and blankets)? [] 1   [] 2   [x] 3   [] 4  
2. Sitting down on and standing up from a chair with arms ( e.g., wheelchair, bedside commode, etc.)   [] 1   [] 2   [x] 3   [] 4  
3. Moving from lying on back to sitting on the side of the bed? [] 1   [] 2   [x] 3   [] 4 How much help from another person does the patient currently need. .. Total A Lot A Little None 4. Moving to and from a bed to a chair (including a wheelchair)? [] 1   [] 2   [x] 3   [] 4  
5. Need to walk in hospital room? [] 1   [] 2   [x] 3   [] 4  
6. Climbing 3-5 steps with a railing? [] 1   [] 2   [x] 3   [] 4  
© 2007, Trustees of [facility], under license to [employer]. All rights reserved Score:  Initial: 18 Most Recent: X (Date: -- ) Interpretation of Tool:  Represents activities that are increasingly more difficult (i.e. Bed mobility, Transfers, Gait). Medical Necessity:    
· Patient is expected to demonstrate progress in  
· strength, balance, and coordination ·  to  
· increase independence with mobility. · . Reason for Services/Other Comments: 
· Patient continues to require skilled intervention due to · Decreased strength and activity tolerance affecting mobility.  
· . Use of outcome tool(s) and clinical judgement create a POC that gives a: Questionable prediction of patient's progress: MODERATE COMPLEXITY  
  
 
 
 
TREATMENT:  
(In addition to Assessment/Re-Assessment sessions the following treatments were rendered) Pre-treatment Symptoms/Complaints:  Patient states she feels a little wobbly and a little dizzy. Pain: Initial:  
   Post Session:  0 Therapeutic Activity: (    8 minutes): Therapeutic activities including Ambulation on level ground and lower body dressing to improve mobility, strength, balance, and coordination. Required minimal   to promote static and dynamic balance in standing and coordination of RW . Braces/Orthotics/Lines/Etc:  
· O2 Device: Nasal cannula Treatment/Session Assessment:   
· Response to Treatment:  Patient pleasant and participated well despite feeling wobbly and a little dizzy. o Physical Therapist 
o Occupational Therapist 
o Registered Nurse · After treatment position/precautions:  
o Supine in bed 
o Bed/Chair-wheels locked 
o Call light within reach 
o RN notified · Compliance with Program/Exercises: Will assess as treatment progresses · Recommendations/Intent for next treatment session: \"Next visit will focus on reduction in assistance provided\". Total Treatment Duration: PT Patient Time In/Time Out Time In: 0305 Time Out: 5320 Leana Reddy, PT

## 2021-04-05 NOTE — PROGRESS NOTES
Shift assessment complete. Pt in bed resting. Oxygen in place via nasal cannula. Pt asks that medications be stopped, stated \"I think its time for me to go, I know I am not going to get better. \" Medications held per pt request. Respirations even and unlabored, lung sounds diminished bilaterally. Pt denies pain. No needs expressed at this time. Bed locked, in lowest position, call light within reach.

## 2021-04-05 NOTE — PROGRESS NOTES
Problem: Falls - Risk of 
Goal: *Absence of Falls Description: Document Sagrario Evans Fall Risk and appropriate interventions in the flowsheet. Outcome: Progressing Towards Goal 
Note: Fall Risk Interventions: 
Mobility Interventions: OT consult for ADLs, Patient to call before getting OOB, PT Consult for assist device competence, Strengthening exercises (ROM-active/passive) Mentation Interventions: Bed/chair exit alarm, Adequate sleep, hydration, pain control, Evaluate medications/consider consulting pharmacy, Increase mobility, More frequent rounding, Toileting rounds, Room close to nurse's station Medication Interventions: Evaluate medications/consider consulting pharmacy, Patient to call before getting OOB, Teach patient to arise slowly, Bed/chair exit alarm Elimination Interventions: Call light in reach, Elevated toilet seat, Patient to call for help with toileting needs, Stay With Me (per policy), Toilet paper/wipes in reach, Toileting schedule/hourly rounds, Bed/chair exit alarm History of Falls Interventions: Door open when patient unattended, Evaluate medications/consider consulting pharmacy, Investigate reason for fall, Utilize gait belt for transfer/ambulation, Room close to nurse's station, Bed/chair exit alarm Problem: Pressure Injury - Risk of 
Goal: *Prevention of pressure injury Description: Document Filemon Scale and appropriate interventions in the flowsheet. Outcome: Progressing Towards Goal 
Note: Pressure Injury Interventions: 
Sensory Interventions: Assess changes in LOC, Keep linens dry and wrinkle-free, Check visual cues for pain, Turn and reposition approx. every two hours (pillows and wedges if needed), Maintain/enhance activity level, Minimize linen layers, Monitor skin under medical devices Moisture Interventions: Apply protective barrier, creams and emollients, Absorbent underpads, Maintain skin hydration (lotion/cream), Minimize layers, Offer toileting Q_hr 
 Activity Interventions: Increase time out of bed, Pressure redistribution bed/mattress(bed type), PT/OT evaluation, Trapeze to reposition Mobility Interventions: Suspension boots, Trapeze to reposition, PT/OT evaluation, Pressure redistribution bed/mattress (bed type) Nutrition Interventions: Offer support with meals,snacks and hydration, Document food/fluid/supplement intake Friction and Shear Interventions: Apply protective barrier, creams and emollients

## 2021-04-05 NOTE — PROGRESS NOTES
SPEECH PATHOLOGY NOTE: 
 
Patient seen for bedside swallow evaluation 4/4/21 with recommendations for a mechanical soft diet/thin liquids. \"Oropharyngeal swallow function WNL. \" Per chart review, patient is discharging to [facility] with hospice care when bed is available on Wednesday. No further speech therapy needs at this time. Please re-consult if new concerns should arise.  
 
 
Mehran Mendez MS, CCC-SLP

## 2021-04-05 NOTE — PROGRESS NOTES
[facility] Hospitalist Progress Note Name:  Tomas Keating  Age:89 y.o. Sex:female :  1920 MRN:  [id number] Admit Date:  4/3/2021 Reason for Admission: 
Acute respiratory failure with hypoxia ([facility] [address] [J96.01] Assessment & Plan  
 
  
Acute on chronic diastolic CHF exacerbation, acute hypoxic respiratory failure, severe mitral regurgitation, pulmonary HTN: 
Stop meds per her request  
Diet as tolerant PT,OT 
PPD/COVID testing Case management for discharge planning to hospice at [facility] O2 to wean as tolerant  
  
  
  
Disposition: pending tomorrow Diet: DIET NPO 
VTE ppx: none GI ppx: none CODE STATUS: Prior Surrogate decision-maker: Katalina Rodriguez [phone] Dispo/Discharge Planning: pending Hospital Course/Subjective:  
 
Tomas Keating is a 80 y.o. female with medical history of  chronic diastolic CHF, acute hypoxic respiratory failure, severe mitral regurgitation, pulmonary HTN, anemia, AFIB, CVA, CKD3,  who presented to ED with hypoxia and shortness of breath. She lives independently at [facility] and was seen in the ED for shortness of breath and lower O2 sats on 21. She and her son refused admit and returned to home. She was found confused with lower O2 sats  and returns to the ED. CXR shows pulmonary edema. Her O2 sats are difficult to obtain due to peripheral extremity coldness. She was  more confused than her baseline status per her son Roney Cross at bedside. He  Requested comfort care and hospice as he feels she will continue to decline. She was admitted for hospice house evaluation and will go to [facility] on hospice. During her 3,2021 admit she was seen by cardiology who recommended no aggressive workup given her advanced age and frailty. On 21 she has family present, more confused today, asking to stop all of her meds, eating some what Objective:  
 
Patient Vitals for the past 24 hrs: 
 Temp Pulse Resp BP SpO2  
21 1127 97.6 °F (36.4 °C) 70 16 132/71 100 % 04/05/21 0743 98.2 °F (36.8 °C) 74 16 120/83 100 % 04/05/21 0243 98.1 °F (36.7 °C) 74 16 (!) 142/74 98 % 04/04/21 2325 98.2 °F (36.8 °C) 69 16 131/67 98 % 04/04/21 1905 98.1 °F (36.7 °C) 73 18 (!) 147/62 100 % Oxygen Therapy O2 Sat (%): 100 % (04/05/21 1127) Pulse via Oximetry: 72 beats per minute (04/03/21 1930) O2 Device: Nasal cannula (04/03/21 2319) O2 Flow Rate (L/min): 5 l/min (04/03/21 2319) Body mass index is 22.17 kg/m². Physical Exam:  
General: No acute distress, speaking in full sentences, no use of accessory muscles, elderly and frail Lungs: Coarse anterior Cardiovascular: Regular rate and rhythm with normal S1 and S2, 1+ edema Abdomen: Soft, nontender, nondistended, normoactive bowel sounds Extremities: No cyanosis Neuro: Nonfocal 
Psych: Normal affect Data Review: 
I have reviewed all labs, meds, and studies from the last 24 hours: 
 
Labs: 
 
Recent Results (from the past 24 hour(s)) SARS-COV-2 Collection Time: 04/05/21 10:23 AM  
Result Value Ref Range SARS-CoV-2 Please find results under separate order COVID-19 RAPID TEST Collection Time: 04/05/21 10:23 AM  
Result Value Ref Range Specimen source Nasopharyngeal    
 COVID-19 rapid test Not detected NOTD PLEASE READ & DOCUMENT PPD TEST IN 24 HRS Collection Time: 04/05/21 11:35 AM  
Result Value Ref Range PPD Negative Negative  
 mm Induration 0 0 - 5 mm All Micro Results Procedure Component Value Units Date/Time SARS-COV-2, PCR [[id number]] Collected: 04/05/21 1023 Order Status: Completed Updated: 04/05/21 1137 COVID-19 RAPID TEST [[id number]] Collected: 04/05/21 1023 Order Status: Completed Specimen: Nasopharyngeal Updated: 04/05/21 1123 Specimen source Nasopharyngeal     
  COVID-19 rapid test Not detected Comment:     
The specimen is NEGATIVE for SARS-CoV-2, the novel coronavirus associated with COVID-19.  
A negative result does not rule out COVID-19. This test has been authorized by the FDA under an Emergency Use Authorization (EUA) for use by authorized laboratories. Fact sheet for Healthcare Providers: ConventionUpdate.co.nz Fact sheet for Patients: ConventionUpdate.co.nz Methodology: Isothermal Nucleic Acid Amplification EKG Results Procedure 720 Value Units Date/Time EKG, 12 LEAD, INITIAL [[id number]] Collected: 04/03/21 1712 Order Status: Completed Updated: 04/04/21 1820 Ventricular Rate 87 BPM   
  Atrial Rate 250 BPM   
  QRS Duration 76 ms   
  Q-T Interval 328 ms QTC Calculation (Bezet) 394 ms Calculated R Axis 68 degrees Calculated T Axis 97 degrees Diagnosis --  
  !! AGE AND GENDER SPECIFIC ECG ANALYSIS !! Atrial fibrillation T wave abnormality, consider inferior ischemia or digitalis effect Abnormal ECG When compared with ECG of 02-APR-2021 16:17, 
T wave inversion now evident in Inferior leads Nonspecific T wave abnormality now evident in Lateral leads QT has shortened Confirmed by Terrance Mcgovern ([id number]) on 4/4/2021 6:20:03 PM 
  
  
 
 
Other Studies: Xr Chest [facility] Result Date: 4/3/2021 PORTABLE CHEST, April 3, 2021 at 1708 hours CLINICAL HISTORY:  Shortness of breath with cyanosis. COMPARISON:  Portable chest yesterday. FINDINGS:  AP erect image demonstrates no confluent infiltrate. The heart remains enlarged with mild pulmonary venous congestion and small pleural effusions in a pattern consistent with mild congestive heart failure or fluid imbalance. No definite pneumothorax. There are overlying radiopaque support devices. NO SIGNIFICANT INTERVAL CHANGE. Current Meds:  
Current Facility-Administered Medications Medication Dose Route Frequency  sodium chloride (NS) flush 5-40 mL  5-40 mL IntraVENous PRN  
 acetaminophen (TYLENOL) tablet 650 mg  650 mg Oral Q6H PRN  Or  
 acetaminophen (TYLENOL) suppository 650 mg  650 mg Rectal Q6H PRN  
 ondansetron (ZOFRAN) injection 4 mg  4 mg IntraVENous Q6H PRN  
 morphine injection 1 mg  1 mg IntraVENous Q4H PRN Problem List: 
Hospital Problems as of 4/5/2021 Date Reviewed: 3/15/2021 Codes Class Noted - Resolved POA * (Principal) Acute respiratory failure with hypoxia [facility]) ICD-10-CM: J96.01 
ICD-9-CM: 518.81  4/3/2021 - Present Yes Acute metabolic encephalopathy ZAS-[id number]: G93.41 
ICD-9-CM: 348.31  4/3/2021 - Present Yes Diastolic CHF, acute on chronic (HCC) ICD-10-CM: I50.33 ICD-9-CM: 428.33, 428.0  4/3/2021 - Present Unknown Transaminitis ICD-10-CM: R74.01 
ICD-9-CM: 790.4  4/3/2021 - Present Yes Pulmonary hypertension (HCC) (Chronic) ICD-10-CM: I27.20 ICD-9-CM: 416.8  4/3/2021 - Present Yes Stage 3b chronic kidney disease ([facility] [address] ICD-10-CM: I35.67 
ICD-9-CM: 585.3  3/16/2021 - Present Yes Moderate mitral regurgitation ICD-10-CM: I34.0 ICD-9-CM: 424.0  3/16/2021 - Present Yes Anemia ICD-10-CM: D64.9 ICD-9-CM: 285.9  3/15/2021 - Present Yes Atrial fibrillation [facility]) ICD-10-CM: I48.91 
ICD-9-CM: 427.31  10/9/2020 - Present Yes  
   
 CVA (cerebral vascular accident) ([facility] [address] ICD-10-CM: I63.9 ICD-9-CM: 434.91  10/6/2020 - Present Yes Part of this note was written by using a voice dictation software and the note has been proof read but may still contain some grammatical/other typographical errors. Signed By: Desiree Tiwari MD  
[facility]  April 5, 2021  11:45 AM

## 2021-04-05 NOTE — PROGRESS NOTES
Patient will complete bathing with stand by assist to increase self care independence. Patient will tolerate 40 minutes of OT treatment with self incorporated rest breaks to increase activity tolerance to enhance participation in hobbies. Patient will complete all functional transfers with stand by assist using adaptive equipment as needed. Patient will complete UE exercises with stand by assist to increase overall activity tolerance and strength. Timeframe: 7 visits OCCUPATIONAL THERAPY: Initial Assessment and Daily Note 4/5/2021 INPATIENT: OT Visit Days: 1 [place] / [place] B / Product Type: Medicare /  
  
NAME/AGE/GENDER: Luis Doherty is a 80 y.o. female PRIMARY DIAGNOSIS:  Acute respiratory failure with hypoxia (HCC) [J96.01] Acute respiratory failure with hypoxia (HCC) Acute respiratory failure with hypoxia (HCC) ICD-10: Treatment Diagnosis:  
 Generalized Muscle Weakness (M62.81) Precautions/Allergies: 
   Amoxicillin, Azithromycin, Ceftriaxone, Cefuroxime, Celecoxib, Dexilant [dexlansoprazole], Keflex [cephalexin], Metronidazole, Pepcid [famotidine], and Sulfamethoxazole-trimethoprim ASSESSMENT:  
 
Ms. Evonne Millan presents with dx above. Pleasant and appreciative of evaluation. She is going home on hospice and is on comfort care. OT/PT have been ordered to assist with opportunities to move around and exercise and maintain independence. She enjoyed working with therapy this date and will see a few times a week as she continues to return home on hospice. Initiate OT. This section established at most recent assessment PROBLEM LIST (Impairments causing functional limitations): 
Decreased Strength Decreased ADL/Functional Activities Decreased Transfer Abilities INTERVENTIONS PLANNED: (Benefits and precautions of occupational therapy have been discussed with the patient.) Activities of daily living training Neuromuscular re-eduation Therapeutic activity Therapeutic exercise TREATMENT PLAN: Frequency/Duration: Follow patient 1-2tx to address above goals. Rehabilitation Potential For Stated Goals: Good REHAB RECOMMENDATIONS (at time of discharge pending progress):   
Placement: It is my opinion, based on this patient's performance to date, that Ms. Lynford Nyhan may benefit from being discharged with NO further skilled therapy due to home on hospice . Equipment:  
None at this time OCCUPATIONAL PROFILE AND HISTORY:  
History of Present Injury/Illness (Reason for Referral): 
See H&P Past Medical History/Comorbidities: Ms. Lynford Nyhan  has a past medical history of Anemia, Arthritis, Atrial fibrillation ([address] (10/2020), Cerebral microvascular disease (2019), CHF (congestive heart failure) ([address] CVA (cerebral vascular accident) ([address] (10/6/2020), Dependent edema, GERD (gastroesophageal reflux disease), Headache, Inguinal hernia recurrent bilateral, Severe mitral regurgitation, Stasis dermatitis of both legs, Temporal arteritis ([address] Thyroid disease, and TMJ click (75/3210). She also has no past medical history of CAD (coronary artery disease) or Cancer ([address] Ms. Lynford Nyhan  has a past surgical history that includes hx gyn; hx heent; hx orthopaedic; hx endoscopy; hx colonoscopy; and hx gi. Social History/Living Environment:  
Home Environment: Independent living One/Two Story Residence: One story Living Alone: Yes Support Systems: Child(luma) Patient Expects to be Discharged to[de-identified] Hospice (comment) Current DME Used/Available at Home: linn Gaspar Prior Level of Function/Work/Activity: 
Lived alone, mostly independent. Number of Personal Factors/Comorbidities that affect the Plan of Care: Brief history (0):  LOW COMPLEXITY ASSESSMENT OF OCCUPATIONAL PERFORMANCE[de-identified]  
Activities of Daily Living:  
Basic ADLs (From Assessment) Complex ADLs (From Assessment) Feeding: Setup Bathing: Minimum assistance Upper Body Dressing: Minimum assistance Lower Body Dressing: Minimum assistance Toileting: Minimum assistance Grooming/Bathing/Dressing Activities of Daily Living Functional Transfers Toilet Transfer : Contact guard assistance Shower Transfer: Contact guard assistance Bed/Mat Mobility Supine to Sit: Supervision Sit to Supine: Supervision Sit to Stand: Contact guard assistance Stand to Sit: Contact guard assistance Most Recent Physical Functioning:  
Gross Assessment: 
  
         
  
Posture: 
Posture (WDL): Exceptions to [facility] Posture Assessment: Forward head, Rounded shoulders Balance: 
Sitting: Intact Standing: Intact Bed Mobility: 
Supine to Sit: Supervision Sit to Supine: Supervision Wheelchair Mobility: 
  
Transfers: 
Sit to Stand: Contact guard assistance Stand to Sit: Contact guard assistance Patient Vitals for the past 6 hrs: 
 BP BP Patient Position SpO2 Pulse 04/05/21 1127 132/71 At rest 100 % 70  
04/05/21 1532 (!) 145/65 At rest 100 % 69 Mental Status Neurologic State: Alert Orientation Level: Oriented X4 Cognition: Appropriate decision making, Appropriate for age attention/concentration, Appropriate safety awareness, Follows commands Physical Skills Involved: 
Range of Motion Balance Strength Activity Tolerance Cognitive Skills Affected (resulting in the inability to perform in a timely and safe manner): 
Sustained Attention Psychosocial Skills Affected: 
Habits/Routines Environmental Adaptation Social Interaction Number of elements that affect the Plan of Care: 1-3:  LOW COMPLEXITY CLINICAL DECISION MAKING:  
[employer] AM-PAC 6 Clicks Daily Activity Inpatient Short Form How much help from another person does the patient currently need. .. Total A Lot A Little None 1. Putting on and taking off regular lower body clothing? [] 1   [] 2   [x] 3   [] 4  
2.   Bathing (including washing, rinsing, drying)? [] 1   [] 2   [x] 3   [] 4  
3. Toileting, which includes using toilet, bedpan or urinal?   [] 1   [] 2   [x] 3   [] 4  
4. Putting on and taking off regular upper body clothing? [] 1   [] 2   [x] 3   [] 4  
5. Taking care of personal grooming such as brushing teeth? [] 1   [] 2   [x] 3   [] 4  
6. Eating meals? [] 1   [] 2   [x] 3   [] 4  
© 2007, Trustees of [address] [place] [address] [address] [address], under license to [employer]. All rights reserved Score:  Initial: 18 Most Recent: X (Date: -- ) Interpretation of Tool:  Represents activities that are increasingly more difficult (i.e. Bed mobility, Transfers, Gait). Medical Necessity:    
Skilled intervention continues to be required due to Deficits lsited above. Reason for Services/Other Comments: 
Patient continues to require skilled intervention due to New Dx Vira Libman Use of outcome tool(s) and clinical judgement create a POC that gives a: MODERATE COMPLEXITY  
 
 
 
TREATMENT:  
(In addition to Assessment/Re-Assessment sessions the following treatments were rendered) Pre-treatment Symptoms/Complaints:   
Pain: Initial:  
Pain Intensity 1: 0  Post Session:  0 Self Care: (10): Procedure(s) (per grid) utilized to improve and/or restore self-care/home management as related to dressing and transfers . Required minimal verbal and tactile cueing to facilitate activities of daily living skills. UE exercises 2 sets of 6 over head against gravity at edge of bed. Initial evaluation 1 unit. Braces/Orthotics/Lines/Etc:  
IV 
O2 Device: Nasal cannula Treatment/Session Assessment:   
Response to Treatment:  Good, supine in bed Interdisciplinary Collaboration:  
Physical Therapist 
Occupational Therapist 
Registered Nurse After treatment position/precautions:  
Supine in bed Compliance with Program/Exercises: Compliant all of the time, Will assess as treatment progresses. Recommendations/Intent for next treatment session:   \"Next visit will focus on advancements to more challenging activities and reduction in assistance provided\". Total Treatment Duration: OT Patient Time In/Time Out Time In: 7277 Time Out: 1506 Natalia Au OT

## 2021-04-05 NOTE — PROGRESS NOTES
Chart reviewed for discharge planning. Per notes, son would like patient to discharge to [facility] skilled care with hospice. CM attempted to reach son by phone; left VM. CM placed referral to [facility] and spoke with admissions coordinator, [facility]. Per [facility], patient can return to [facility] skilled care with hospice; they typically use [facility] and request that a hospice order be sent to facility with patient. Patient will need PPD and COVID (w/i 48 hrs of discharge). PPD was placed 4/4; order placed for COVID today. CM will continue to follow to assist with discharge needs. 1120: CM spoke with son at bedside. Son confirms that he would like patient to return to [facility] for skilled care with [facility]. PPD and COVID are pending. Patient may transfer to [facility] on Tuesday if bed available and COVID PCR resulted. 1430: CM received notification from [facility] that bed will not be available until Wednesday.

## 2021-04-06 LAB
MM INDURATION POC: 0 MM (ref 0–5)
PPD POC: NEGATIVE NEGATIVE
SARS-COV-2, COV2: NOT DETECTED
SPECIMEN SOURCE, FCOV2M: NORMAL

## 2021-04-06 PROCEDURE — 65270000029 HC RM PRIVATE

## 2021-04-06 PROCEDURE — 97110 THERAPEUTIC EXERCISES: CPT

## 2021-04-06 NOTE — PROGRESS NOTES
[facility]ist Progress Note Name:  Katie Gusman  Age:89 y.o. Sex:female :  1920 MRN:  [id number] Admit Date:  4/3/2021 Reason for Admission: 
Acute respiratory failure with hypoxia ([address] [J96.01] Assessment & Plan  
 
  
Acute on chronic diastolic CHF exacerbation, acute hypoxic respiratory failure, severe mitral regurgitation, pulmonary HTN: 
Stopped meds per her request  
Diet as tolerant PT,OT 
PPD/COVID testing for SNF placement Case management for discharge planning to hospice at SNF-discharge tomorrow O2 to wean as tolerant  
  
  
  
Disposition: pending tomorrow Diet: regular VTE ppx: none GI ppx: none CODE STATUS: Prior Surrogate decision-maker: Delbert Sherman [phone] Dispo/Discharge Planning: pending Hospital Course/Subjective:  
 
Katie Gusman is a 80 y.o. female with medical history of  chronic diastolic CHF, acute hypoxic respiratory failure, severe mitral regurgitation, pulmonary HTN, anemia, AFIB, CVA, CKD3,  who presented to ED with hypoxia and shortness of breath. She lives independently at [facility] and was seen in the ED for shortness of breath and lower O2 sats on 21. She and her son refused admit and returned to home. She was found confused with lower O2 sats  and returns to the ED. CXR shows pulmonary edema. Her O2 sats are difficult to obtain due to peripheral extremity coldness. She was  more confused than her baseline status per her son Hunter Mckeon at bedside. He  Requested comfort care and hospice as he feels she will continue to decline. She was admitted for hospice house evaluation and will go to [facility] on hospice. During her 3,2021 admit she was seen by cardiology who recommended no aggressive workup given her advanced age and frailty. On 21 sound asleep, RN states doing ok and resting Objective:  
 
Patient Vitals for the past 24 hrs: 
 Temp Pulse Resp BP SpO2  
21 1455 98.2 °F (36.8 °C) 76 16 118/63 99 % 04/06/21 0652 97.6 °F (36.4 °C) 63 16 (!) 145/74 97 % 04/05/21 2154     100 % 04/05/21 1932 98.3 °F (36.8 °C) 62 16 112/60 99 % Oxygen Therapy O2 Sat (%): 99 % (04/06/21 1455) Pulse via Oximetry: 74 beats per minute (04/05/21 2154) O2 Device: Nasal cannula (04/05/21 2154) O2 Flow Rate (L/min): 5 l/min (04/05/21 2154) Body mass index is 22.17 kg/m². Physical Exam:  
General: sleeping, no distress Data Review: 
I have reviewed all labs, meds, and studies from the last 24 hours: 
 
Labs: 
 
Recent Results (from the past 24 hour(s)) PLEASE READ & DOCUMENT PPD TEST IN 48 HRS Collection Time: 04/06/21 11:35 AM  
Result Value Ref Range PPD Negative Negative  
 mm Induration 0 0 - 5 mm All Micro Results Procedure Component Value Units Date/Time SARS-COV-2, PCR [[id number]] Collected: 04/05/21 1023 Order Status: Completed Specimen: Nasopharyngeal Updated: 04/06/21 1252 Specimen source Nasopharyngeal     
  SARS-CoV-2 Not detected Comment:     
The specimen is NEGATIVE for SARS-CoV-2, the novel coronavirus associated with COVID-19. A negative result does not rule out COVID-19. This test has been authorized by the FDA under an Emergency Use Authorization (EUA) for use by authorized laboratories. Fact sheet for Healthcare Providers: ConventionUpdate.co.nz Fact sheet for Patients: https://fda.gov/media/882780/download Methodology: RT-PCR 
  
  
 COVID-19 RAPID TEST [[id number]] Collected: 04/05/21 1023 Order Status: Completed Specimen: Nasopharyngeal Updated: 04/05/21 1123 Specimen source Nasopharyngeal     
  COVID-19 rapid test Not detected Comment:     
The specimen is NEGATIVE for SARS-CoV-2, the novel coronavirus associated with COVID-19. A negative result does not rule out COVID-19.  
    
This test has been authorized by the FDA under an Emergency Use Authorization (EUA) for use by authorized laboratories. Fact sheet for Healthcare Providers: ConventionUpdate.co.nz Fact sheet for Patients: ConventionUpdate.co.nz Methodology: Isothermal Nucleic Acid Amplification EKG Results Procedure 720 Value Units Date/Time EKG, 12 LEAD, INITIAL [[id number]] Collected: 04/03/21 1712 Order Status: Completed Updated: 04/04/21 1820 Ventricular Rate 87 BPM   
  Atrial Rate 250 BPM   
  QRS Duration 76 ms   
  Q-T Interval 328 ms QTC Calculation (Bezet) 394 ms Calculated R Axis 68 degrees Calculated T Axis 97 degrees Diagnosis --  
  !! AGE AND GENDER SPECIFIC ECG ANALYSIS !! Atrial fibrillation T wave abnormality, consider inferior ischemia or digitalis effect Abnormal ECG When compared with ECG of 02-APR-2021 16:17, 
T wave inversion now evident in Inferior leads Nonspecific T wave abnormality now evident in Lateral leads QT has shortened Confirmed by Jac Thompson ([id number]) on 4/4/2021 6:20:03 PM 
  
  
 
 
Other Studies: Xr Chest [facility] Result Date: 4/3/2021 PORTABLE CHEST, April 3, 2021 at 1708 hours CLINICAL HISTORY:  Shortness of breath with cyanosis. COMPARISON:  Portable chest yesterday. FINDINGS:  AP erect image demonstrates no confluent infiltrate. The heart remains enlarged with mild pulmonary venous congestion and small pleural effusions in a pattern consistent with mild congestive heart failure or fluid imbalance. No definite pneumothorax. There are overlying radiopaque support devices. NO SIGNIFICANT INTERVAL CHANGE. Current Meds:  
Current Facility-Administered Medications Medication Dose Route Frequency  sodium chloride (NS) flush 5-40 mL  5-40 mL IntraVENous PRN  
 acetaminophen (TYLENOL) tablet 650 mg  650 mg Oral Q6H PRN  Or  
 acetaminophen (TYLENOL) suppository 650 mg  650 mg Rectal Q6H PRN  
 ondansetron (ZOFRAN) injection 4 mg  4 mg IntraVENous Q6H PRN  
 morphine injection 1 mg  1 mg IntraVENous Q4H PRN Problem List: 
Hospital Problems as of 4/6/2021 Date Reviewed: 3/15/2021 Codes Class Noted - Resolved POA * (Principal) Acute respiratory failure with hypoxia [facility]) ICD-10-CM: J96.01 
ICD-9-CM: 518.81  4/3/2021 - Present Yes Acute metabolic encephalopathy [facility]-10-LJ: G93.41 
ICD-9-CM: 348.31  4/3/2021 - Present Yes Diastolic CHF, acute on chronic (HCC) ICD-10-CM: I50.33 ICD-9-CM: 428.33, 428.0  4/3/2021 - Present Unknown Transaminitis ICD-10-CM: R74.01 
ICD-9-CM: 790.4  4/3/2021 - Present Yes Pulmonary hypertension (HCC) (Chronic) ICD-10-CM: I27.20 ICD-9-CM: 416.8  4/3/2021 - Present Yes Stage 3b chronic kidney disease ([facility] ICD-10-CM: M42.27 
ICD-9-CM: 585.3  3/16/2021 - Present Yes Moderate mitral regurgitation ICD-10-CM: I34.0 ICD-9-CM: 424.0  3/16/2021 - Present Yes Anemia ICD-10-CM: D64.9 ICD-9-CM: 285.9  3/15/2021 - Present Yes Atrial fibrillation [facility]) ICD-10-CM: I48.91 
ICD-9-CM: 427.31  10/9/2020 - Present Yes  
   
 CVA (cerebral vascular accident) ([facility] ICD-10-CM: I63.9 ICD-9-CM: 434.91  10/6/2020 - Present Yes Part of this note was written by using a voice dictation software and the note has been proof read but may still contain some grammatical/other typographical errors. Signed By: Heather Bailey MD  
[facility]  April 6, 2021  11:45 AM

## 2021-04-06 NOTE — PROGRESS NOTES
Problem: Mobility Impaired (Adult and Pediatric) Goal: *Acute Goals and Plan of Care (Insert Text) Problem: Mobility Impaired (Adult and Pediatric) Goal: *Acute Goals and Plan of Care (Insert Text) Description: LTG: 
(1.)Ms. Meagan Mercado will move from supine to sit and sit to supine  in bed with SUPERVISION within 5 treatment day(s). (2.)Ms. Meagan Mercado will transfer from bed to chair and chair to bed with SUPERVISION using the least restrictive device within 5 treatment day(s). (3.)Ms. Meagan Mercado will ambulate with SUPERVISION for 100 feet with the least restrictive device within 5 treatment day(s). ________________________________________________________________________________________________ Outcome: Progressing Towards Goal 
  
PHYSICAL THERAPY: Daily Note and PM 4/6/2021 INPATIENT: PT Visit Days : 2 [place] / [place] B / Product Type: Medicare /   
  
NAME/AGE/GENDER: Duglas Cardenas is a 80 y.o. female PRIMARY DIAGNOSIS: Acute respiratory failure with hypoxia (HCC) [J96.01] Acute respiratory failure with hypoxia (HCC) Acute respiratory failure with hypoxia (HCC) ICD-10: Treatment Diagnosis:  
 · Generalized Muscle Weakness (M62.81) · Difficulty in walking, Not elsewhere classified (R26.2) Precaution/Allergies: 
Amoxicillin, Azithromycin, Ceftriaxone, Cefuroxime, Celecoxib, Dexilant [dexlansoprazole], Keflex [cephalexin], Metronidazole, Pepcid [famotidine], and Sulfamethoxazole-trimethoprim ASSESSMENT:  
 
Ms. Meagan Mercado admitted with above diagnosis. Patient demonstrates generalized weakness and decreased activity tolerance which limits her independence with mobility. Patient was living independently at [facility] and ambulating with a rollator. Therapy ordered as patient was wanting to walk and having difficulty getting around in the room.   Spoke with MD and clarified that patient is comfort measures at this time and plan is for d/c to a SNF with hospice services, not STR. Will plan to continue to check on patient during admission and offer therapy services and opportunities to walk. She is pleasant but a bit confused. Suggested family bring her rollator to make mobility a bit easier as she struggled with directing the RW. 
4/5--Pt performed supine to sit. Pt performed exercises sitting edge of bed. Pt performed sit to stand. Pt took steps to head of bed. Pt performed sit to supine. Pt supine with needs in reach. 4/6--Pt performed exercises in bed. Pt supine with needs in reach. This section established at most recent assessment PROBLEM LIST (Impairments causing functional limitations): 1. Decreased Strength 2. Decreased ADL/Functional Activities 3. Decreased Transfer Abilities 4. Decreased Ambulation Ability/Technique 5. Decreased Balance 6. Decreased Activity Tolerance 7. Increased Shortness of Breath 8. Decreased Cognition INTERVENTIONS PLANNED: (Benefits and precautions of physical therapy have been discussed with the patient.) 1. Balance Exercise 2. Bed Mobility 3. Family Education 4. Gait Training 5. Home Exercise Program (HEP) 6. Therapeutic Activites 7. Therapeutic Exercise/Strengthening TREATMENT PLAN: Frequency/Duration: daily for duration of hospital stay Rehabilitation Potential For Stated Goals: Good REHAB RECOMMENDATIONS (at time of discharge pending progress):   
Placement: It is my opinion, based on this patient's performance to date, that Ms. Dameon Sanchez may benefit from d/c to SNF for hospice services. Equipment:  
 None at this time HISTORY:  
History of Present Injury/Illness (Reason for Referral): Radha Morales is a 80 y.o. female with medical history of  chronic diastolic CHF, acute hypoxic respiratory failure, severe mitral regurgitation, pulmonary HTN, anemia, AFIB, CVA, CKD3,  who presented to ED with hypoxia and shortness of breath.  She lives independently at [facility] and was seen in the ED for shortness of breath and lower O2 sats on 4-2-21. She and her son refused admit and returned to home. She was found confused with lower O2 sats today and returns to the ED. CXR shows pulmonary edema. Her O2 sats are difficult to obtain. She is more confused than her baseline status per her son Tali Awkjanell at bedside. He is requesting comfort care and hospice as he feels she will continue to decline. I also discussed care with Dr. Lorie Levi of the ED. Past Medical History/Comorbidities: Ms. Chito Smallwood  has a past medical history of Anemia, Arthritis, Atrial fibrillation ([address] (10/2020), Cerebral microvascular disease (2019), CHF (congestive heart failure) ([address] CVA (cerebral vascular accident) ([address] (10/6/2020), Dependent edema, GERD (gastroesophageal reflux disease), Headache, Inguinal hernia recurrent bilateral, Severe mitral regurgitation, Stasis dermatitis of both legs, Temporal arteritis ([address] Thyroid disease, and TMJ click ([id number]). She also has no past medical history of CAD (coronary artery disease) or Cancer ([address] Ms. Chito Smallwood  has a past surgical history that includes hx gyn; hx heent; hx orthopaedic; hx endoscopy; hx colonoscopy; and hx gi. Social History/Living Environment:  
Home Environment: Independent living One/Two Story Residence: One story Living Alone: Yes Support Systems: Child(luma) Patient Expects to be Discharged to[de-identified] Hospice (comment) Current DME Used/Available at Home: [employer]ec[employer] Prior Level of Function/Work/Activity: 
Living I and ambulating with rollator. Number of Personal Factors/Comorbidities that affect the Plan of Care: 1-2: MODERATE COMPLEXITY EXAMINATION:  
Most Recent Physical Functioning:  
Gross Assessment: 
  
         
  
Posture: 
  
Balance: 
  Bed Mobility: 
  
Wheelchair Mobility: 
  
Transfers: 
  
Gait: 
  
   
  
Body Structures Involved: 1. Muscles Body Functions Affected: 1. Movement Related Activities and Participation Affected: 1. Mobility Number of elements that affect the Plan of Care: 3: MODERATE COMPLEXITY CLINICAL PRESENTATION:  
Presentation: Evolving clinical presentation with changing clinical characteristics: MODERATE COMPLEXITY CLINICAL DECISION MAKIN [facility] [address] [address] AM-PAC 6 Clicks Basic Mobility Inpatient Short Form How much difficulty does the patient currently have. .. Unable A Lot A Little None 1. Turning over in bed (including adjusting bedclothes, sheets and blankets)? [] 1   [] 2   [x] 3   [] 4  
2. Sitting down on and standing up from a chair with arms ( e.g., wheelchair, bedside commode, etc.)   [] 1   [] 2   [x] 3   [] 4  
3. Moving from lying on back to sitting on the side of the bed? [] 1   [] 2   [x] 3   [] 4 How much help from another person does the patient currently need. .. Total A Lot A Little None 4. Moving to and from a bed to a chair (including a wheelchair)? [] 1   [] 2   [x] 3   [] 4  
5. Need to walk in hospital room? [] 1   [] 2   [x] 3   [] 4  
6. Climbing 3-5 steps with a railing? [] 1   [] 2   [x] 3   [] 4  
© , Trustees of [facility] [address] [address], under license to [employer]. All rights reserved Score:  Initial: 18 Most Recent: X (Date: -- ) Interpretation of Tool:  Represents activities that are increasingly more difficult (i.e. Bed mobility, Transfers, Gait). Medical Necessity:    
· Patient is expected to demonstrate progress in  
· strength, balance, and coordination ·  to  
· increase independence with mobility. · . Reason for Services/Other Comments: 
· Patient continues to require skilled intervention due to · Decreased strength and activity tolerance affecting mobility. · . Use of outcome tool(s) and clinical judgement create a POC that gives a: Questionable prediction of patient's progress: MODERATE COMPLEXITY  
  
 
 
 
TREATMENT:  
(In addition to Assessment/Re-Assessment sessions the following treatments were rendered) Pre-treatment Symptoms/Complaints:  Patient states she feels a little wobbly and a little dizzy. Pain: Initial:  
   Post Session:  0 Therapeutic Activity: (     minutes): Therapeutic activities including Ambulation on level ground and lower body dressing to improve mobility, strength, balance, and coordination. Required minimal   to promote static and dynamic balance in standing and coordination of RW . Therapeutic Exercise: ( 15 minutes):  Exercises per grid below to improve mobility. Date: 
4/6 Date: 
 Date: Activity/Exercise Parameters Parameters Parameters Ankle pumps 10 Heel slides 10 Quad sets 10 Shoulder shrugs 10 Head left and right Side bend  10 Braces/Orthotics/Lines/Etc:  
· O2 Device: Nasal cannula Treatment/Session Assessment:   
· Response to Treatment:  Patient agreeable to exercises in bed.  
o Physical Therapist 
o Registered Nurse · After treatment position/precautions:  
o Supine in bed 
o Bed/Chair-wheels locked 
o Call light within reach 
o RN notified · Compliance with Program/Exercises: Will assess as treatment progresses · Recommendations/Intent for next treatment session: \"Next visit will focus on reduction in assistance provided\". Total Treatment Duration: PT Patient Time In/Time Out Time In: 6232 Time Out: 1440 Tono Swan PT

## 2021-04-06 NOTE — PROGRESS NOTES
04/05/21 2154 Oxygen Therapy O2 Sat (%) 100 % Pulse via Oximetry 74 beats per minute O2 Device Nasal cannula O2 Flow Rate (L/min) 5 l/min O2 spot check complete. Patient remains on 5L NC. No distress noted at this time.

## 2021-04-06 NOTE — PROGRESS NOTES
Pt in bed resting. Oxygen in place via nasal cannula. Respirations even and unlabored, lung sounds diminished bilaterally. Pt denies pain. No needs expressed at this time.  
  
Bed locked, in lowest position, call light within reach

## 2021-04-07 VITALS
WEIGHT: 109.79 LBS | TEMPERATURE: 97.7 F | RESPIRATION RATE: 16 BRPM | DIASTOLIC BLOOD PRESSURE: 79 MMHG | BODY MASS INDEX: 22.13 KG/M2 | HEART RATE: 72 BPM | OXYGEN SATURATION: 97 % | SYSTOLIC BLOOD PRESSURE: 139 MMHG | HEIGHT: 59 IN

## 2021-04-07 RX ORDER — AMOXICILLIN 250 MG
1 CAPSULE ORAL DAILY
Status: DISCONTINUED | OUTPATIENT
Start: 2021-04-07 | End: 2021-04-07 | Stop reason: HOSPADM

## 2021-04-07 RX ORDER — POLYETHYLENE GLYCOL 3350 17 G/17G
17 POWDER, FOR SOLUTION ORAL DAILY
Status: DISCONTINUED | OUTPATIENT
Start: 2021-04-07 | End: 2021-04-07 | Stop reason: HOSPADM

## 2021-04-07 RX ORDER — POLYETHYLENE GLYCOL 3350 17 G/17G
17 POWDER, FOR SOLUTION ORAL DAILY
Qty: 30 EACH | Refills: 0 | Status: SHIPPED
Start: 2021-04-07

## 2021-04-07 RX ORDER — DOCUSATE SODIUM 100 MG/1
100 CAPSULE, LIQUID FILLED ORAL 2 TIMES DAILY
Qty: 60 CAP | Refills: 0 | Status: SHIPPED
Start: 2021-04-07 | End: 2021-07-06

## 2021-04-07 NOTE — PROGRESS NOTES
TRANSFER - OUT REPORT: 
 
Verbal report given to Dre Contreras 1428 on Kathleen Hayes  being transferred to [facility]  for routine progression of care Report consisted of patients Situation, Background, Assessment and  
Recommendations(SBAR). Information from the following report(s) SBAR was reviewed with the receiving nurse. Lines:  
Peripheral IV 04/03/21 Right Antecubital (Active) Site Assessment Clean, dry, & intact 04/06/21 1920 Phlebitis Assessment 0 04/06/21 1920 Infiltration Assessment 0 04/06/21 1920 Dressing Status Clean, dry, & intact 04/06/21 1920 Dressing Type Transparent;Tape 04/06/21 1920 Hub Color/Line Status Capped 04/06/21 1920 Alcohol Cap Used No 04/03/21 1720 Opportunity for questions and clarification was provided.    
 
Patient transported with:

## 2021-04-07 NOTE — DISCHARGE SUMMARY
[facility] Hospitalist Discharge Summary Name:  Eula Umana    Age:89 y.o. Sex:female :  1920 MRN:  [id number] Admitting Physician: Bryant Vyas MD Admit Date: 4/3/2021  4:47 PM  
Attending Physician: Charly Neri MD 
Primary Care Physician: Herb Quach MD  
 
 
Discharge Physician: Bryant Vyas MD  Discharge date: 21 Discharged Condition: Stable Indication for Admission: Chief Complaint Patient presents with  Shortness of Breath Reasons for hospitalization: 
Hospital Problems as of 2021 Date Reviewed: 3/15/2021 Codes Class Noted - Resolved POA * (Principal) Acute respiratory failure with hypoxia [facility]) ICD-10-CM: J96.01 
ICD-9-CM: 518.81  4/3/2021 - Present Yes Acute metabolic encephalopathy LWG-50-WZ: G93.41 
ICD-9-CM: 348.31  4/3/2021 - Present Yes Diastolic CHF, acute on chronic (HCC) ICD-10-CM: I50.33 ICD-9-CM: 428.33, 428.0  4/3/2021 - Present Unknown Transaminitis ICD-10-CM: R74.01 
ICD-9-CM: 790.4  4/3/2021 - Present Yes Pulmonary hypertension (HCC) (Chronic) ICD-10-CM: I27.20 ICD-9-CM: 416.8  4/3/2021 - Present Yes Stage 3b chronic kidney disease ([facility] ICD-10-CM: V38.38 
ICD-9-CM: 585.3  3/16/2021 - Present Yes Moderate mitral regurgitation ICD-10-CM: I34.0 ICD-9-CM: 424.0  3/16/2021 - Present Yes Anemia ICD-10-CM: D64.9 ICD-9-CM: 285.9  3/15/2021 - Present Yes Atrial fibrillation [facility]) ICD-10-CM: I48.91 
ICD-9-CM: 427.31  10/9/2020 - Present Yes  
   
 CVA (cerebral vascular accident) ([facility] ICD-10-CM: I63.9 ICD-9-CM: 434.91  10/6/2020 - Present Yes Discharge Diagnosis: acute on chronic diastolic CHF with severe mitral regurgitation Did Patient have Sepsis (YES OR NO): no 
 
 
Hospital Course :  
Eula Umana is a 80 y.o. female with medical history of  chronic diastolic CHF, acute hypoxic respiratory failure, severe mitral regurgitation, pulmonary HTN, anemia, AFIB, CVA, CKD3,  who presented to ED with hypoxia and shortness of breath. She lives independently at [facility] and was seen in the ED for shortness of breath and lower O2 sats on 4-2-21. She and her son refused admit and returned to home. She was found confused with lower O2 sats  and returns to the ED. CXR shows pulmonary edema. Her O2 sats are difficult to obtain due to peripheral extremity coldness. She was  more confused than her baseline status per her son Lorena Rowland at bedside. He  Requested comfort care and hospice as he feels she will continue to decline. She was admitted for hospice evaluation and will go to [facility] on hospice. During her 3,2021 admit she was seen by cardiology who recommended no aggressive workup given her advanced age and frailty. She is on supplemental O2 to keep sats greater than 92.  
  
Plans are for discharge today. Consults: None Disposition: [place] Diet: DIET GI SOFT Code Status: DNR Discharge Info:  
 
Current Discharge Medication List  
  
START taking these medications Details  
docusate sodium (COLACE) 100 mg capsule Take 1 Cap by mouth two (2) times a day for 90 days. Qty: 60 Cap, Refills: 0  
  
polyethylene glycol (MIRALAX) 17 gram packet Take 1 Packet by mouth daily. Qty: 30 Each, Refills: 0 STOP taking these medications  
  
 ferrous sulfate 325 mg (65 mg iron) tablet Comments:  
Reason for Stopping:   
   
 pantoprazole (PROTONIX) 40 mg tablet Comments:  
Reason for Stopping:   
   
 sucralfate (CARAFATE) 1 gram tablet Comments:  
Reason for Stopping:   
   
 aspirin delayed-release 81 mg tablet Comments:  
Reason for Stopping:   
   
 levothyroxine (SYNTHROID) 50 mcg tablet Comments:  
Reason for Stopping:   
   
 levothyroxine (SYNTHROID) 75 mcg tablet Comments:  
Reason for Stopping:   
   
 predniSONE (DELTASONE) 5 mg tablet Comments:  
Reason for Stopping:   
   
  
 
 
Medications Discontinued During This Encounter Medication Reason  sodium chloride (NS) flush 5-40 mL  ferrous sulfate tablet 325 mg   
 pantoprazole (PROTONIX) tablet 40 mg   
 sucralfate (CARAFATE) tablet 1 g   
 levothyroxine (SYNTHROID) tablet 50 mcg  levothyroxine (SYNTHROID) tablet 75 mcg  predniSONE (DELTASONE) tablet 5 mg  aspirin delayed-release tablet 81 mg Follow Up Orders: Follow-up Appointments Procedures  FOLLOW UP VISIT Appointment in: Other (Specify) At facility At facility Standing Status:   Standing Number of Occurrences:   1 Order Specific Question:   Appointment in Answer: Other (Specify) Follow-up Information Follow up With Specialties Details Why Contact Info [facility]) [address] [address] 
[phone] Jordy Jose MD Internal Medicine   [address] [place] [address] [address]  Lydia Harding 
[phone] Discharge Exam: 
 
Patient Vitals for the past 24 hrs: 
 Temp Pulse Resp BP SpO2  
04/07/21 0400 99 °F (37.2 °C) 80 14 124/86 99 % 04/06/21 1455 98.2 °F (36.8 °C) 76 16 118/63 99 % Oxygen Therapy O2 Sat (%): 99 % (04/07/21 0400) Pulse via Oximetry: 74 beats per minute (04/05/21 2154) O2 Device: Nasal cannula (04/05/21 2154) O2 Flow Rate (L/min): 5 l/min (04/05/21 2154) Estimated body mass index is 22.17 kg/m² as calculated from the following: 
  Height as of this encounter: 4' 11\" (1.499 m). Weight as of this encounter: 49.8 kg (109 lb 12.6 oz). Intake/Output Summary (Last 24 hours) at 4/7/2021 1643 Last data filed at 4/7/2021 5132 Gross per 24 hour Intake  Output 250 ml Net -250 ml *Note that automatically entered I/Os may not be accurate; dependent on patient compliance with collection and accurate  by assistants.  
 
Physical Exam:  
General: No acute distress, speaking in full sentences, no use of accessory muscles Lungs: Clear to auscultation bilaterally anterior Cardiovascular: Regular rate and rhythm with normal S1 and S2, 1+ edema Abdomen: Soft, nontender, nondistended, normoactive bowel sounds Extremities: No cyanosis clubbing Neuro: Nonfocal 
Psych: Normal affect All Labs from Last 24 Hrs: 
Recent Results (from the past 24 hour(s)) PLEASE READ & DOCUMENT PPD TEST IN 48 HRS Collection Time: 04/06/21 11:35 AM  
Result Value Ref Range PPD Negative Negative  
 mm Induration 0 0 - 5 mm All Micro Results Procedure Component Value Units Date/Time SARS-COV-2, PCR [[id number]] Collected: 04/05/21 1023 Order Status: Completed Specimen: Nasopharyngeal Updated: 04/06/21 1252 Specimen source Nasopharyngeal     
  SARS-CoV-2 Not detected Comment:     
The specimen is NEGATIVE for SARS-CoV-2, the novel coronavirus associated with COVID-19. A negative result does not rule out COVID-19. This test has been authorized by the FDA under an Emergency Use Authorization (EUA) for use by authorized laboratories. Fact sheet for Healthcare Providers: [employer] Fact sheet for Patients: https://fda.gov/media/553189/download Methodology: RT-PCR 
  
  
 COVID-19 RAPID TEST [[id number]] Collected: 04/05/21 1023 Order Status: Completed Specimen: Nasopharyngeal Updated: 04/05/21 1123 Specimen source Nasopharyngeal     
  COVID-19 rapid test Not detected Comment:     
The specimen is NEGATIVE for SARS-CoV-2, the novel coronavirus associated with COVID-19. A negative result does not rule out COVID-19. This test has been authorized by the FDA under an Emergency Use Authorization (EUA) for use by authorized laboratories. Fact sheet for Healthcare Providers: [employer] Fact sheet for Patients: kstattoo.com Methodology: Isothermal Nucleic Acid Amplification SARS-CoV-2 Lab Results \"Novel Coronavirus\" Test: No results found for: COV2NT \"Emergent Disease\" Test: No results found for: EDPR \"SARS-COV-2\" Test: No results found for: XGCOVT Rapid Test:  
Lab Results Component Value Date/Time COVR Not detected 04/05/2021 10:23 AM  
 
  
 
 
Diagnostic Imaging/Tests:  
Xr Chest [facility] Result Date: 4/3/2021 PORTABLE CHEST, April 3, 2021 at 1708 hours CLINICAL HISTORY:  Shortness of breath with cyanosis. COMPARISON:  Portable chest yesterday. FINDINGS:  AP erect image demonstrates no confluent infiltrate. The heart remains enlarged with mild pulmonary venous congestion and small pleural effusions in a pattern consistent with mild congestive heart failure or fluid imbalance. No definite pneumothorax. There are overlying radiopaque support devices. NO SIGNIFICANT INTERVAL CHANGE. Xr Chest [facility] Result Date: 4/2/2021 Portable chest: History: Pt has been at [facility], [employer] got o2 sat of 80% but unsure if accurate. Hx of CHF, extremity edema, history of anemia and non compliant with medications. \"Purplish tint to extremities\". Comparison: 03/16/2021 Findings: A single view of the chest was obtained at 1620 hours. The cardiac silhouette is mildly enlarged, unchanged. There are small bilateral pleural effusions, likely unchanged. There is mild interstitial prominence. Findings suspicious for mild interstitial edema and small pleural effusions. Xr Chest [facility] Result Date: 3/16/2021 Chest X-ray INDICATION: Shortness of breath A portable AP view of the chest was obtained. FINDINGS: There is a new right pleural effusion. There is stable cardiomegaly. The bony thorax is intact. New small right pleural effusion Ct Code Neuro Head Wo Contrast 
 
Result Date: 3/17/2021 EXAM: CT CODE NEURO HEAD WO CONTRAST HISTORY: .  TECHNIQUE: Axial images of the brain were performed without the administration of intravenous contrast. Images were obtained axial plane and coronal reformatted images were submitted. CT scan performed using appropriate/available dose optimization/reduction/ALARA techniques. COMPARISON: CT head dated 10/6/2020, MRI head dated 10/7/2020 FINDINGS: There is no evidence of acute intracranial hemorrhage, midline shift, or mass effect. No intra or extra-axial fluid collections are observed. There are moderate chronic appearing microangiopathic changes within the periventricular white matter. No evidence of acute confluent territorial infarction. Ventricles and basal cisterns are patent and symmetric. There is moderate generalized volume loss. Visualized paranasal sinuses and mastoid air cells are without significant fluid. Scalp, soft tissues, and calvarium are within normal limits. 1. No CT evidence of acute intracranial process. 2. Chronic changes as above. Echocardiogram/EKG results: No results found for this visit on 04/03/21. EKG Results Procedure 720 Value Units Date/Time EKG, 12 LEAD, INITIAL [[id number]] Collected: 04/03/21 1712 Order Status: Completed Updated: 04/04/21 1820 Ventricular Rate 87 BPM   
  Atrial Rate 250 BPM   
  QRS Duration 76 ms   
  Q-T Interval 328 ms QTC Calculation (Bezet) 394 ms Calculated R Axis 68 degrees Calculated T Axis 97 degrees Diagnosis --  
  !! AGE AND GENDER SPECIFIC ECG ANALYSIS !! Atrial fibrillation T wave abnormality, consider inferior ischemia or digitalis effect Abnormal ECG When compared with ECG of 02-APR-2021 16:17, 
T wave inversion now evident in Inferior leads Nonspecific T wave abnormality now evident in Lateral leads QT has shortened Confirmed by Samara Hidalgo ([id number]) on 4/4/2021 6:20:03 PM 
  
  
 
 
Results for orders placed or performed during the hospital encounter of 04/03/21 EKG, 12 LEAD, INITIAL Result Value Ref Range Ventricular Rate 87 BPM  
 Atrial Rate 250 BPM  
 QRS Duration 76 ms  
 Q-T Interval 328 ms QTC Calculation (Bezet) 394 ms Calculated R Axis 68 degrees Calculated T Axis 97 degrees Diagnosis    
  !! AGE AND GENDER SPECIFIC ECG ANALYSIS !! Atrial fibrillation T wave abnormality, consider inferior ischemia or digitalis effect Abnormal ECG When compared with ECG of 02-APR-2021 16:17, 
T wave inversion now evident in Inferior leads Nonspecific T wave abnormality now evident in Lateral leads QT has shortened Confirmed by Joyce South ([id number]) on 4/4/2021 6:20:03 PM 
  
 
 
Procedures done this admission: * No surgery found * Time spent in patient discharge planning and coordination 30 minutes. Signed By: Joanie Richardson MD  
[facility] Hospitalist Service  April 7, 2021  7:27 AM

## 2021-04-07 NOTE — PROGRESS NOTES
Care Management Interventions PCP Verified by CM: Yes Transition of Care Consult (CM Consult): [facility] Current Support Network: Assisted Living The Plan for Transition of Care is Related to the Following Treatment Goals : Comfort Care The Patient and/or Patient Representative was Provided with a Choice of Provider and Agrees with the Discharge Plan?: Yes Freedom of Choice List was Provided with Basic Dialogue that Supports the Patient's Individualized Plan of Care/Goals, Treatment Preferences and Shares the Quality Data Associated with the Providers?: Yes Discharge Location Discharge Placement: Home with hospice Discharge orders received. Spoke with Sobia at [facility] who gave permission for her admission today. Patient will go to # [phone]. They have spoken to the son and he is aware of her transfer today. I also left a VM with him for 11:30am  time. Siomara Casillas has contacted [facility] and will make an official referral to them once she is admitted. Ambulance DNR obtained. Amber Blue

## 2022-01-14 NOTE — PROGRESS NOTES
Pt showed asystole on monitor; this RN and charge RN went down to pt's room. Pt was able to be aroused by voice and touch to chest. Pt was presenting w/ stroke like symptoms (left sided weakness; left sided vision loss; left pupil unresponsive to light; and aphasia). Called code S. NIH was 10. Dr. Ramone Sanchez and ICU RNs came to bedside. Pt slowly more responsive. Taken down to head CT and tele neuro consult was performed. NIH now 0 per tele neuro. Pt was asked about code status; she said she would decide after cardiology consult today. 18